# Patient Record
Sex: MALE | Race: WHITE | URBAN - METROPOLITAN AREA
[De-identification: names, ages, dates, MRNs, and addresses within clinical notes are randomized per-mention and may not be internally consistent; named-entity substitution may affect disease eponyms.]

---

## 2022-03-03 ENCOUNTER — DOCTOR'S OFFICE (OUTPATIENT)
Dept: URBAN - METROPOLITAN AREA CLINIC 125 | Facility: CLINIC | Age: 57
Setting detail: OPHTHALMOLOGY
End: 2022-03-03
Payer: COMMERCIAL

## 2022-03-03 ENCOUNTER — RX ONLY (RX ONLY)
Age: 57
End: 2022-03-03

## 2022-03-03 DIAGNOSIS — H52.13: ICD-10-CM

## 2022-03-03 DIAGNOSIS — H52.4: ICD-10-CM

## 2022-03-03 DIAGNOSIS — H52.203: ICD-10-CM

## 2022-03-03 PROCEDURE — 92004 COMPRE OPH EXAM NEW PT 1/>: CPT | Performed by: OPHTHALMOLOGY

## 2022-03-03 PROCEDURE — 92015 DETERMINE REFRACTIVE STATE: CPT | Performed by: OPHTHALMOLOGY

## 2022-03-03 ASSESSMENT — REFRACTION_MANIFEST
OD_AXIS: 175
OD_VA1: 20/30
OS_AXIS: 25
OS_CYLINDER: -0.50
OS_VA1: 20/30
OS_ADD: +2.00
OS_VA2: 20/30+1
OS_SPHERE: -1.25
OD_CYLINDER: -0.50
OU_VA: 20/30
OD_SPHERE: -0.75
OD_VA2: 20/30+1
OD_ADD: +2.00

## 2022-03-03 ASSESSMENT — CONFRONTATIONAL VISUAL FIELD TEST (CVF)
OD_FINDINGS: FULL
OS_FINDINGS: FULL

## 2022-03-03 ASSESSMENT — VISUAL ACUITY
OD_BCVA: 20/40
OS_BCVA: 20/50

## 2022-03-03 ASSESSMENT — SPHEQUIV_DERIVED
OS_SPHEQUIV: -1.5
OS_SPHEQUIV: -0.875
OD_SPHEQUIV: -0.875
OD_SPHEQUIV: -1

## 2022-03-03 ASSESSMENT — KERATOMETRY
OS_AXISANGLE_DEGREES: 004
OD_AXISANGLE_DEGREES: 169
OS_K2POWER_DIOPTERS: 45.25
OD_K1POWER_DIOPTERS: 43.75
OS_K1POWER_DIOPTERS: 44.00
OD_K2POWER_DIOPTERS: 45.00

## 2022-03-03 ASSESSMENT — REFRACTION_AUTOREFRACTION
OD_SPHERE: -0.50
OS_SPHERE: -0.75
OS_AXIS: 055
OD_CYLINDER: -0.75
OS_CYLINDER: -0.25
OD_AXIS: 174

## 2022-03-03 ASSESSMENT — AXIALLENGTH_DERIVED
OD_AL: 23.6114
OD_AL: 23.6603
OS_AL: 23.5198
OS_AL: 23.7643

## 2023-05-24 ENCOUNTER — APPOINTMENT (EMERGENCY)
Dept: RADIOLOGY | Facility: HOSPITAL | Age: 58
End: 2023-05-24

## 2023-05-24 ENCOUNTER — HOSPITAL ENCOUNTER (INPATIENT)
Facility: HOSPITAL | Age: 58
LOS: 6 days | Discharge: HOME | End: 2023-05-30
Attending: EMERGENCY MEDICINE | Admitting: INTERNAL MEDICINE

## 2023-05-24 ENCOUNTER — APPOINTMENT (EMERGENCY)
Dept: CT IMAGING | Facility: HOSPITAL | Age: 58
End: 2023-05-24

## 2023-05-24 DIAGNOSIS — J90 PLEURAL EFFUSION: Primary | ICD-10-CM

## 2023-05-24 DIAGNOSIS — K20.90 ESOPHAGITIS: ICD-10-CM

## 2023-05-24 DIAGNOSIS — M10.9 GOUTY ARTHRITIS OF RIGHT GREAT TOE: ICD-10-CM

## 2023-05-24 DIAGNOSIS — R09.81 NASAL CONGESTION: ICD-10-CM

## 2023-05-24 DIAGNOSIS — J90 RECURRENT PLEURAL EFFUSION ON RIGHT: ICD-10-CM

## 2023-05-24 DIAGNOSIS — K86.9 LESION OF PANCREAS: ICD-10-CM

## 2023-05-24 DIAGNOSIS — K56.609 SMALL BOWEL OBSTRUCTION (HCC): ICD-10-CM

## 2023-05-24 DIAGNOSIS — J69.0 ASPIRATION PNEUMONIA (HCC): ICD-10-CM

## 2023-05-24 DIAGNOSIS — K59.00 CONSTIPATION: ICD-10-CM

## 2023-05-24 PROBLEM — F79 MENTAL IMPAIRMENT: Status: ACTIVE | Noted: 2023-04-21

## 2023-05-24 PROBLEM — J45.30 MILD PERSISTENT ASTHMA WITHOUT COMPLICATION: Status: ACTIVE | Noted: 2023-03-17

## 2023-05-24 PROBLEM — N40.0 BPH (BENIGN PROSTATIC HYPERPLASIA): Status: ACTIVE | Noted: 2023-01-20

## 2023-05-24 PROBLEM — E66.01 MORBID OBESITY (HCC): Status: ACTIVE | Noted: 2023-03-17

## 2023-05-24 PROBLEM — G47.33 OSA (OBSTRUCTIVE SLEEP APNEA): Status: ACTIVE | Noted: 2023-05-05

## 2023-05-24 PROBLEM — K52.9 CHRONIC DIARRHEA: Status: ACTIVE | Noted: 2023-03-17

## 2023-05-24 PROBLEM — Q45.3 PANCREATIC ABNORMALITY: Status: ACTIVE | Noted: 2023-05-24

## 2023-05-24 PROBLEM — F31.9 BIPOLAR DISORDER (HCC): Status: ACTIVE | Noted: 2023-01-20

## 2023-05-24 PROBLEM — K56.600 SMALL BOWEL OBSTRUCTION, PARTIAL (HCC): Status: ACTIVE | Noted: 2023-05-24

## 2023-05-24 PROBLEM — D64.9 NORMOCYTIC ANEMIA: Status: ACTIVE | Noted: 2023-01-20

## 2023-05-24 PROBLEM — I51.7 CARDIOMEGALY: Status: ACTIVE | Noted: 2023-01-20

## 2023-05-24 PROBLEM — I10 ESSENTIAL HYPERTENSION: Status: ACTIVE | Noted: 2023-01-20

## 2023-05-24 LAB
2HR DELTA HS TROPONIN: 0 NG/L
ALBUMIN SERPL BCP-MCNC: 3.8 G/DL (ref 3.5–5)
ALP SERPL-CCNC: 55 U/L (ref 34–104)
ALT SERPL W P-5'-P-CCNC: 9 U/L (ref 7–52)
ANION GAP SERPL CALCULATED.3IONS-SCNC: 8 MMOL/L (ref 4–13)
APTT PPP: 26 SECONDS (ref 23–37)
AST SERPL W P-5'-P-CCNC: 10 U/L (ref 13–39)
ATRIAL RATE: 85 BPM
ATRIAL RATE: 87 BPM
BASOPHILS # BLD AUTO: 0.01 THOUSANDS/ÂΜL (ref 0–0.1)
BASOPHILS NFR BLD AUTO: 0 % (ref 0–1)
BILIRUB SERPL-MCNC: 0.53 MG/DL (ref 0.2–1)
BUN SERPL-MCNC: 22 MG/DL (ref 5–25)
CALCIUM SERPL-MCNC: 9.1 MG/DL (ref 8.4–10.2)
CARDIAC TROPONIN I PNL SERPL HS: 3 NG/L
CARDIAC TROPONIN I PNL SERPL HS: 3 NG/L
CHLORIDE SERPL-SCNC: 100 MMOL/L (ref 96–108)
CO2 SERPL-SCNC: 30 MMOL/L (ref 21–32)
CREAT SERPL-MCNC: 1.23 MG/DL (ref 0.6–1.3)
EOSINOPHIL # BLD AUTO: 0 THOUSAND/ÂΜL (ref 0–0.61)
EOSINOPHIL NFR BLD AUTO: 0 % (ref 0–6)
ERYTHROCYTE [DISTWIDTH] IN BLOOD BY AUTOMATED COUNT: 13.7 % (ref 11.6–15.1)
FLUAV RNA RESP QL NAA+PROBE: NEGATIVE
FLUBV RNA RESP QL NAA+PROBE: NEGATIVE
GFR SERPL CREATININE-BSD FRML MDRD: 65 ML/MIN/1.73SQ M
GLUCOSE SERPL-MCNC: 168 MG/DL (ref 65–140)
HCT VFR BLD AUTO: 43.5 % (ref 36.5–49.3)
HGB BLD-MCNC: 14.2 G/DL (ref 12–17)
IMM GRANULOCYTES # BLD AUTO: 0.03 THOUSAND/UL (ref 0–0.2)
IMM GRANULOCYTES NFR BLD AUTO: 0 % (ref 0–2)
INR PPP: 1.12 (ref 0.84–1.19)
LACTATE SERPL-SCNC: 1.4 MMOL/L (ref 0.5–2)
LIPASE SERPL-CCNC: <6 U/L (ref 11–82)
LYMPHOCYTES # BLD AUTO: 0.4 THOUSANDS/ÂΜL (ref 0.6–4.47)
LYMPHOCYTES NFR BLD AUTO: 4 % (ref 14–44)
MCH RBC QN AUTO: 31.3 PG (ref 26.8–34.3)
MCHC RBC AUTO-ENTMCNC: 32.6 G/DL (ref 31.4–37.4)
MCV RBC AUTO: 96 FL (ref 82–98)
MONOCYTES # BLD AUTO: 1.45 THOUSAND/ÂΜL (ref 0.17–1.22)
MONOCYTES NFR BLD AUTO: 14 % (ref 4–12)
NEUTROPHILS # BLD AUTO: 8.38 THOUSANDS/ÂΜL (ref 1.85–7.62)
NEUTS SEG NFR BLD AUTO: 82 % (ref 43–75)
NRBC BLD AUTO-RTO: 0 /100 WBCS
P AXIS: 16 DEGREES
P AXIS: 30 DEGREES
PLATELET # BLD AUTO: 287 THOUSANDS/UL (ref 149–390)
PMV BLD AUTO: 9.8 FL (ref 8.9–12.7)
POTASSIUM SERPL-SCNC: 4.1 MMOL/L (ref 3.5–5.3)
PR INTERVAL: 126 MS
PR INTERVAL: 136 MS
PROCALCITONIN SERPL-MCNC: 0.06 NG/ML
PROT SERPL-MCNC: 7.3 G/DL (ref 6.4–8.4)
PROTHROMBIN TIME: 14.6 SECONDS (ref 11.6–14.5)
QRS AXIS: 70 DEGREES
QRS AXIS: 83 DEGREES
QRSD INTERVAL: 92 MS
QRSD INTERVAL: 92 MS
QT INTERVAL: 376 MS
QT INTERVAL: 382 MS
QTC INTERVAL: 452 MS
QTC INTERVAL: 454 MS
RBC # BLD AUTO: 4.54 MILLION/UL (ref 3.88–5.62)
RSV RNA RESP QL NAA+PROBE: NEGATIVE
S PYO DNA THROAT QL NAA+PROBE: NOT DETECTED
SARS-COV-2 RNA RESP QL NAA+PROBE: NEGATIVE
SODIUM SERPL-SCNC: 138 MMOL/L (ref 135–147)
T WAVE AXIS: 33 DEGREES
T WAVE AXIS: 46 DEGREES
VENTRICULAR RATE: 85 BPM
VENTRICULAR RATE: 87 BPM
WBC # BLD AUTO: 10.27 THOUSAND/UL (ref 4.31–10.16)

## 2023-05-24 RX ORDER — ONDANSETRON 2 MG/ML
4 INJECTION INTRAMUSCULAR; INTRAVENOUS ONCE
Status: COMPLETED | OUTPATIENT
Start: 2023-05-24 | End: 2023-05-24

## 2023-05-24 RX ORDER — MONTELUKAST SODIUM 10 MG/1
10 TABLET ORAL
COMMUNITY
Start: 2023-03-17

## 2023-05-24 RX ORDER — DIPHENHYDRAMINE HCL 50 MG
50 CAPSULE ORAL EVERY 6 HOURS PRN
COMMUNITY
Start: 2023-03-17

## 2023-05-24 RX ORDER — METOCLOPRAMIDE HYDROCHLORIDE 5 MG/ML
10 INJECTION INTRAMUSCULAR; INTRAVENOUS ONCE
Status: COMPLETED | OUTPATIENT
Start: 2023-05-24 | End: 2023-05-24

## 2023-05-24 RX ORDER — ALLOPURINOL 100 MG/1
100 TABLET ORAL DAILY
COMMUNITY
Start: 2023-03-24 | End: 2024-03-23

## 2023-05-24 RX ORDER — DIPHENOXYLATE HYDROCHLORIDE AND ATROPINE SULFATE 2.5; .025 MG/1; MG/1
2 TABLET ORAL 2 TIMES DAILY PRN
COMMUNITY
Start: 2023-03-17

## 2023-05-24 RX ORDER — ENOXAPARIN SODIUM 100 MG/ML
40 INJECTION SUBCUTANEOUS DAILY
Status: DISCONTINUED | OUTPATIENT
Start: 2023-05-25 | End: 2023-05-24 | Stop reason: DRUGHIGH

## 2023-05-24 RX ORDER — LOSARTAN POTASSIUM 100 MG/1
100 TABLET ORAL DAILY
COMMUNITY
Start: 2023-01-24 | End: 2024-01-24

## 2023-05-24 RX ORDER — PANTOPRAZOLE SODIUM 40 MG/10ML
40 INJECTION, POWDER, LYOPHILIZED, FOR SOLUTION INTRAVENOUS
Status: DISCONTINUED | OUTPATIENT
Start: 2023-05-25 | End: 2023-05-28

## 2023-05-24 RX ORDER — IBUPROFEN 600 MG/1
600 TABLET ORAL EVERY 6 HOURS PRN
Status: ON HOLD | COMMUNITY
Start: 2023-03-17 | End: 2023-05-29 | Stop reason: SDUPTHER

## 2023-05-24 RX ORDER — ACETAMINOPHEN 325 MG/1
650 TABLET ORAL ONCE
Status: COMPLETED | OUTPATIENT
Start: 2023-05-24 | End: 2023-05-24

## 2023-05-24 RX ORDER — RISPERIDONE 0.5 MG/1
0.5 TABLET ORAL 2 TIMES DAILY
COMMUNITY

## 2023-05-24 RX ORDER — RISPERIDONE 1 MG/1
1 TABLET ORAL 2 TIMES DAILY
COMMUNITY

## 2023-05-24 RX ORDER — LOSARTAN POTASSIUM 100 MG/1
25 TABLET ORAL DAILY
COMMUNITY

## 2023-05-24 RX ORDER — COLCHICINE 0.6 MG/1
0.6 CAPSULE ORAL DAILY
COMMUNITY
Start: 2023-04-24

## 2023-05-24 RX ORDER — DIVALPROEX SODIUM 500 MG/1
500 TABLET, DELAYED RELEASE ORAL 2 TIMES DAILY
COMMUNITY

## 2023-05-24 RX ORDER — EPINEPHRINE 0.3 MG/.3ML
0.3 INJECTION SUBCUTANEOUS
COMMUNITY
Start: 2023-03-17

## 2023-05-24 RX ORDER — POLYETHYLENE GLYCOL 3350 17 G/17G
17 POWDER, FOR SOLUTION ORAL
COMMUNITY
Start: 2023-04-21 | End: 2024-04-20

## 2023-05-24 RX ORDER — ESCITALOPRAM OXALATE 20 MG/1
20 TABLET ORAL DAILY
COMMUNITY

## 2023-05-24 RX ORDER — BENZONATATE 200 MG/1
200 CAPSULE ORAL 3 TIMES DAILY PRN
COMMUNITY
Start: 2023-03-17 | End: 2023-05-30

## 2023-05-24 RX ORDER — FUROSEMIDE 20 MG/1
1 TABLET ORAL DAILY PRN
COMMUNITY
Start: 2023-03-17 | End: 2023-09-13

## 2023-05-24 RX ORDER — IPRATROPIUM BROMIDE AND ALBUTEROL SULFATE 2.5; .5 MG/3ML; MG/3ML
3 SOLUTION RESPIRATORY (INHALATION)
Status: DISCONTINUED | OUTPATIENT
Start: 2023-05-24 | End: 2023-05-25

## 2023-05-24 RX ORDER — TAMSULOSIN HYDROCHLORIDE 0.4 MG/1
0.8 CAPSULE ORAL
COMMUNITY
Start: 2023-03-17 | End: 2024-03-16

## 2023-05-24 RX ORDER — ENOXAPARIN SODIUM 100 MG/ML
40 INJECTION SUBCUTANEOUS EVERY 12 HOURS SCHEDULED
Status: DISCONTINUED | OUTPATIENT
Start: 2023-05-24 | End: 2023-05-25

## 2023-05-24 RX ADMIN — METOCLOPRAMIDE 10 MG: 5 INJECTION, SOLUTION INTRAMUSCULAR; INTRAVENOUS at 13:27

## 2023-05-24 RX ADMIN — ONDANSETRON 4 MG: 2 INJECTION INTRAMUSCULAR; INTRAVENOUS at 14:34

## 2023-05-24 RX ADMIN — ENOXAPARIN SODIUM 40 MG: 40 INJECTION SUBCUTANEOUS at 21:21

## 2023-05-24 RX ADMIN — AZITHROMYCIN MONOHYDRATE 500 MG: 500 INJECTION, POWDER, LYOPHILIZED, FOR SOLUTION INTRAVENOUS at 15:45

## 2023-05-24 RX ADMIN — RISPERIDONE 1.5 MG: 0.5 TABLET, ORALLY DISINTEGRATING ORAL at 21:21

## 2023-05-24 RX ADMIN — VALPROATE SODIUM 250 MG: 100 INJECTION, SOLUTION INTRAVENOUS at 20:02

## 2023-05-24 RX ADMIN — ACETAMINOPHEN 650 MG: 325 TABLET ORAL at 13:19

## 2023-05-24 RX ADMIN — CEFTRIAXONE SODIUM 1000 MG: 10 INJECTION, POWDER, FOR SOLUTION INTRAVENOUS at 15:24

## 2023-05-24 RX ADMIN — IPRATROPIUM BROMIDE AND ALBUTEROL SULFATE 3 ML: 2.5; .5 SOLUTION RESPIRATORY (INHALATION) at 12:49

## 2023-05-24 RX ADMIN — ONDANSETRON 4 MG: 2 INJECTION INTRAMUSCULAR; INTRAVENOUS at 12:45

## 2023-05-24 RX ADMIN — IOHEXOL 100 ML: 350 INJECTION, SOLUTION INTRAVENOUS at 14:47

## 2023-05-24 RX ADMIN — SODIUM CHLORIDE 1000 ML: 0.9 INJECTION, SOLUTION INTRAVENOUS at 12:48

## 2023-05-24 NOTE — ASSESSMENT & PLAN NOTE
Evidence of esophagitis on CT  No noted history    Plan:  add PPI  Patient does have appointment with outpatient GI for issue with chronic diarrhea, can follow-up bout this esophagitis at that time

## 2023-05-24 NOTE — CONSULTS
Consultation - General Surgery   Chaitanya Ragsdale 66 y o  male MRN: [id number]  Unit/Bed#: ED-16 Encounter: [id number]    Assessment/Plan     Assessment:  51-year-old male with a history and exam consistent with likely gastroenteritis  Given CT findings, would recommend gastric decompression with a repeat CT scan on 5/25 with oral contrast     Plan:  -Recommend admission to medicine  -Continue n p o   -NG tube to low continuous wall suction  -Tentative plan for repeat CT abdomen pelvis with p o  contrast on 5/25  -Monitor abdominal exam  -Please TT the RA surgery resident role with questions or concerns    History of Present Illness     HPI:  Chaitanya Ragsdale is a 66 y o  male who presents with abdominal pain, nausea, and vomiting in addition to coughing and chest congestion with fever  He is here with one of his caregivers who notes that he has been experiencing abdominal pain and nausea for the last 2 weeks and that over the past 2 days he has had increasing abdominal pain and diarrhea  When he got to the emergency department he had bilious vomiting  He has had 3 bowel movements today, the last of which was just prior to arrival in the emergency department  He is passing gas  He has a history of bipolar disease for which she is on multiple medications  No known prior surgeries  No reported history of bowel obstructions  Per his caregiver, he has a history of chronic pleural effusions which are drained intermittently  Consult to surgery general  Consult performed by: Bernice Ovalle MD  Consult ordered by: Darryl Hilario PA-C          Review of Systems   Constitutional: Positive for fever  Negative for chills  Respiratory: Positive for cough and shortness of breath  Cardiovascular: Negative for chest pain and leg swelling  Gastrointestinal: Positive for abdominal distention, abdominal pain, diarrhea, nausea and vomiting  Negative for constipation     All other systems reviewed and are negative  Historical Information   History reviewed  No pertinent past medical history  History reviewed  No pertinent surgical history  Social History   Social History     Substance and Sexual Activity   Alcohol Use None     Social History     Substance and Sexual Activity   Drug Use Not on file     E-Cigarette/Vaping     E-Cigarette/Vaping Substances     Social History     Tobacco Use   Smoking Status Not on file   Smokeless Tobacco Not on file     Family History: non-contributory    Meds/Allergies   all current active meds have been reviewed and PTA meds:   None     Allergies   Allergen Reactions   • Bee Venom Anaphylaxis, Hives and Swelling       Objective   First Vitals:   Blood Pressure: 157/85 (05/24/23 1155)  Pulse: 83 (05/24/23 1155)  Temperature: 100 2 °F (37 9 °C) (05/24/23 1155)  Temp Source: Oral (05/24/23 1155)  Respirations: (S) (!) 24 (05/24/23 1157)  Weight - Scale: 118 kg (259 lb 4 2 oz) (05/24/23 1155)  SpO2: 96 % (05/24/23 1155)    Current Vitals:   Blood Pressure: 109/64 (05/24/23 1715)  Pulse: 83 (05/24/23 1715)  Temperature: 99 5 °F (37 5 °C) (05/24/23 1418)  Temp Source: Oral (05/24/23 1418)  Respirations: 20 (05/24/23 1700)  Weight - Scale: 118 kg (259 lb 4 2 oz) (05/24/23 1155)  SpO2: 95 % (05/24/23 1715)      Intake/Output Summary (Last 24 hours) at 5/24/2023 1749  Last data filed at 5/24/2023 1700  Gross per 24 hour   Intake 1300 ml   Output --   Net 1300 ml       Invasive Devices     Peripheral Intravenous Line  Duration           Peripheral IV 05/24/23 Distal;Right;Upper;Ventral (anterior) Arm <1 day                Physical Exam  Vitals reviewed  Constitutional:       Appearance: He is obese  He is ill-appearing  Cardiovascular:      Rate and Rhythm: Normal rate and regular rhythm  Pulmonary:      Comments: Coarse breath sounds, intermittent cough  Abdominal:      General: There is distension  Palpations: Abdomen is soft  Tenderness:  There is no abdominal tenderness  There is no guarding or rebound  Skin:     General: Skin is warm and dry  Coloration: Skin is not jaundiced or pale  Neurological:      Mental Status: He is alert and oriented to person, place, and time  Lab Results:   I have personally reviewed pertinent lab results  , CBC:   Lab Results   Component Value Date    HCT 43 5 05/24/2023    HGB 14 2 05/24/2023    MCH 31 3 05/24/2023    MCHC 32 6 05/24/2023    MCV 96 05/24/2023    MPV 9 8 05/24/2023    NRBC 0 05/24/2023     05/24/2023    RBC 4 54 05/24/2023    RDW 13 7 05/24/2023    WBC 10 27 (H) 05/24/2023   , CMP:   Lab Results   Component Value Date    ALKPHOS 55 05/24/2023    ALT 9 05/24/2023    AST 10 (L) 05/24/2023    BUN 22 05/24/2023    CALCIUM 9 1 05/24/2023     05/24/2023    CO2 30 05/24/2023    CREATININE 1 23 05/24/2023    EGFR 65 05/24/2023    K 4 1 05/24/2023    SODIUM 138 05/24/2023     Imaging: I have personally reviewed pertinent reports  and I have personally reviewed pertinent films in PACS  EKG, Pathology, and Other Studies: I have personally reviewed pertinent reports

## 2023-05-24 NOTE — ASSESSMENT & PLAN NOTE
· Patient is n p o , will switch risperidone twice daily to oral disintegrating tablets  · We will switch Depakote to IV (250 mg every 6 hours, per pharmacy)

## 2023-05-24 NOTE — ASSESSMENT & PLAN NOTE
· Caregiver reports patient eats very rapidly and often coughs/chokes during eating  · Rhonchorous breath sounds in the emergency department  · Received dose of antibiotics in the emergency department    Plan:  · Supplemental O2 as needed for O2 saturation greater than 94%  · Further antibiotics for now  · DuoNebs every 6 hours  · Monitor WBC and fever curve  · Procalcitonin in a m , although confounded from pleural effusion

## 2023-05-24 NOTE — ASSESSMENT & PLAN NOTE
· Newly diagnosed via sleep study  · Not currently using BiPAP  · BiPAP cannot be used while patient has NG tube in

## 2023-05-24 NOTE — ED PROVIDER NOTES
History  Chief Complaint   Patient presents with   • Nasal Congestion     Pt presentse to the ED with chest congestion, sob, diarrhea, abd pain x 2-3 days  66year old male presents today with concerns of chest congestion, abdominal pain, diarrhea, nausea, thick sputum, fever  Patient also complains of some chills  Patient has a history of bipolar for which she is on multiple medications, allergies as well  History of reflexes to be benign  No recent exposures  States that the chest congestion and cough have been ongoing for the last 2 days slowly worsening  States that the abdominal pain has been ongoing for about 2 weeks, diarrhea for the last couple days  Denies any vomiting however has been nauseous over the last couple days  Denies any leg swelling or chest pain although does admit to some chest tightness  No shortness of breath at this time  No history of asthma or COPD  Lives with a roommate, checked in with by adult services multiple times a week  Patient's caregiver states that he typically does not look this bad  Per [facility] history contains : Allergic   Benign prostatic hyperplasia   Bipolar disorder, unspecified ([facility] [address] )   Edema 2023   Enlarged prostate   Hypertension   Mild cognitive impairment   Pleural effusion 2023   Pulmonary nodule   Seizures (HCC)             Prior to Admission Medications   Prescriptions Last Dose Informant Patient Reported? Taking?    Colchicine 0 6 MG CAPS 5/24/2023  Yes Yes   Sig: Take 0 6 mg by mouth daily   EPINEPHrine (EPIPEN) 0 3 mg/0 3 mL SOAJ Unknown  Yes No   Sig: Inject 0 3 mg into a muscle   allopurinol (ZYLOPRIM) 100 mg tablet 5/24/2023  Yes Yes   Sig: Take 100 mg by mouth daily   benzonatate (TESSALON) 200 MG capsule Not Taking  Yes No   Sig: Take 200 mg by mouth Three times daily as needed   Patient not taking: Reported on 5/24/2023   diphenhydrAMINE (BENADRYL) 50 mg capsule Unknown  Yes No   Sig: Take 50 mg by mouth every 6 (six) hours as needed diphenoxylate-atropine (LOMOTIL) 2 5-0 025 mg per tablet Unknown  Yes No   Sig: Take 2 tablets by mouth 2 (two) times a day as needed   divalproex sodium (DEPAKOTE) 500 mg DR tablet 5/24/2023  Yes Yes   Sig: Take 500 mg by mouth 2 (two) times a day   escitalopram (LEXAPRO) 20 mg tablet 5/24/2023  Yes Yes   Sig: Take 20 mg by mouth daily   furosemide (LASIX) 20 mg tablet Unknown  Yes No   Sig: Take 1 tablet by mouth daily as needed   ibuprofen (MOTRIN) 600 mg tablet Unknown  Yes No   Sig: Take 600 mg by mouth every 6 (six) hours as needed   losartan (COZAAR) 100 MG tablet 5/24/2023  Yes Yes   Sig: Take 25 mg by mouth daily   losartan (COZAAR) 100 MG tablet Unknown  Yes No   Sig: Take 100 mg by mouth daily   menthol-zinc oxide (CALMOSEPTINE) 0 44-20 6 % OINT Unknown  Yes No   Sig: Apply topically   montelukast (SINGULAIR) 10 mg tablet Unknown  Yes No   Sig: Take 10 mg by mouth   polyethylene glycol (GLYCOLAX) 17 GM/SCOOP powder Unknown  Yes No   Sig: Take 17 g by mouth   risperiDONE (RisperDAL) 0 5 mg tablet 5/24/2023  Yes Yes   Sig: Take 0 5 mg by mouth 2 (two) times a day   risperiDONE (RisperDAL) 1 mg tablet 5/24/2023  Yes Yes   Sig: Take 1 mg by mouth 2 (two) times a day   tamsulosin (FLOMAX) 0 4 mg Unknown  Yes No   Sig: Take 0 8 mg by mouth      Facility-Administered Medications: None       History reviewed  No pertinent past medical history  History reviewed  No pertinent surgical history  History reviewed  No pertinent family history  I have reviewed and agree with the history as documented  E-Cigarette/Vaping     E-Cigarette/Vaping Substances          Review of Systems   Constitutional: Positive for chills and fever  HENT: Positive for congestion and postnasal drip  Negative for ear pain, nosebleeds, sore throat and trouble swallowing  Eyes: Negative for pain and visual disturbance  Respiratory: Positive for cough and chest tightness   Negative for apnea, choking, shortness of breath, wheezing and stridor  Cardiovascular: Negative for chest pain, palpitations and leg swelling  Gastrointestinal: Positive for abdominal pain, diarrhea and nausea  Negative for abdominal distention, anal bleeding, blood in stool, constipation, rectal pain and vomiting  Genitourinary: Negative for dysuria, flank pain, frequency and hematuria  Musculoskeletal: Negative for arthralgias and back pain  Skin: Negative for color change and rash  Neurological: Negative for dizziness, seizures, syncope, weakness, light-headedness and headaches  All other systems reviewed and are negative  Physical Exam  Physical Exam  Vitals and nursing note reviewed  Constitutional:       General: He is not in acute distress  Appearance: He is well-developed  He is obese  He is ill-appearing  HENT:      Head: Normocephalic and atraumatic  Eyes:      Conjunctiva/sclera: Conjunctivae normal    Cardiovascular:      Rate and Rhythm: Normal rate and regular rhythm  Pulses: Normal pulses  Heart sounds: Normal heart sounds  No murmur heard  No friction rub  No gallop  Pulmonary:      Effort: Pulmonary effort is normal  No respiratory distress  Breath sounds: No stridor  Rhonchi present  No wheezing or rales  Chest:      Chest wall: No tenderness  Abdominal:      General: There is distension (Due to obesity)  Palpations: Abdomen is soft  Tenderness: There is abdominal tenderness  There is no right CVA tenderness, left CVA tenderness, guarding or rebound  Hernia: No hernia is present  Musculoskeletal:         General: No swelling, tenderness, deformity or signs of injury  Cervical back: Neck supple  Right lower leg: Edema present  Left lower leg: Edema (Trace bilaterally) present  Skin:     General: Skin is warm and dry  Capillary Refill: Capillary refill takes less than 2 seconds  Coloration: Skin is not jaundiced or pale        Findings: No bruising, erythema, lesion or rash  Neurological:      Mental Status: He is alert     Psychiatric:         Mood and Affect: Mood normal          Vital Signs  ED Triage Vitals   Temperature Pulse Respirations Blood Pressure SpO2   05/24/23 1155 05/24/23 1155 05/24/23 1157 05/24/23 1155 05/24/23 1155   100 2 °F (37 9 °C) 83 (S) (!) 24 157/85 96 %      Temp Source Heart Rate Source Patient Position - Orthostatic VS BP Location FiO2 (%)   05/24/23 1155 05/24/23 1155 05/24/23 1155 05/24/23 1155 --   Oral Monitor Sitting Left arm       Pain Score       05/24/23 1405       6           Vitals:    05/24/23 1715 05/24/23 1730 05/24/23 1800 05/24/23 1852   BP: 109/64 104/55 108/56 109/84   Pulse: 83 80 80 90   Patient Position - Orthostatic VS: Lying   Lying         Visual Acuity      ED Medications  Medications   ipratropium-albuterol (DUO-NEB) 0 5-2 5 mg/3 mL inhalation solution 3 mL (3 mL Nebulization Given 5/24/23 1249)   valproate (DEPACON) 250 mg in sodium chloride 0 9 % 50 mL IVPB (250 mg Intravenous New Bag 5/24/23 2002)   risperiDONE (RisperDAL M-TAB) disintegrating tablet 1 5 mg (has no administration in time range)   pantoprazole (PROTONIX) injection 40 mg (has no administration in time range)   enoxaparin (LOVENOX) subcutaneous injection 40 mg (has no administration in time range)   sodium chloride 0 9 % bolus 1,000 mL (0 mL Intravenous Stopped 5/24/23 1459)   acetaminophen (TYLENOL) tablet 650 mg (650 mg Oral Given 5/24/23 1319)   ondansetron (ZOFRAN) injection 4 mg (4 mg Intravenous Given 5/24/23 1245)   metoclopramide (REGLAN) injection 10 mg (10 mg Intravenous Given 5/24/23 1327)   ondansetron (ZOFRAN) injection 4 mg (4 mg Intravenous Given 5/24/23 1434)   iohexol (OMNIPAQUE) 350 MG/ML injection (SINGLE-DOSE) 100 mL (100 mL Intravenous Given 5/24/23 1447)   ceftriaxone (ROCEPHIN) 1 g/50 mL in dextrose IVPB (0 mg Intravenous Stopped 5/24/23 1540)   azithromycin (ZITHROMAX) 500 mg in sodium chloride 0 9% 250mL IVPB 500 mg (0 mg Intravenous Stopped 5/24/23 1700)       Diagnostic Studies  Results Reviewed     Procedure Component Value Units Date/Time    Blood culture #1 [[id number]] Collected: 05/24/23 1238    Lab Status: Preliminary result Specimen: Blood from Arm, Left Updated: 05/24/23 1701     Blood Culture Received in Microbiology Lab  Culture in Progress  Blood culture #2 [[id number]] Collected: 05/24/23 1238    Lab Status: Preliminary result Specimen: Blood from Arm, Right Updated: 05/24/23 1701     Blood Culture Received in Microbiology Lab  Culture in Progress  Lipase [[id number]]  (Abnormal) Collected: 05/24/23 1238    Lab Status: Final result Specimen: Blood from Arm, Right Updated: 05/24/23 1656     Lipase <6 u/L     HS Troponin I 2hr [[id number]]  (Normal) Collected: 05/24/23 1523    Lab Status: Final result Specimen: Blood from Arm, Right Updated: 05/24/23 1556     hs TnI 2hr 3 ng/L      Delta 2hr hsTnI 0 ng/L     FLU/RSV/COVID - if FLU/RSV clinically relevant [[id number]]  (Normal) Collected: 05/24/23 1238    Lab Status: Final result Specimen: Nares from Nose Updated: 05/24/23 1330     SARS-CoV-2 Negative     INFLUENZA A PCR Negative     INFLUENZA B PCR Negative     RSV PCR Negative    Narrative:      FOR PEDIATRIC PATIENTS - copy/paste COVID Guidelines URL to browser: [email or web address]    SARS-CoV-2 assay is a Nucleic Acid Amplification assay intended for the  qualitative detection of nucleic acid from SARS-CoV-2 in nasopharyngeal  swabs  Results are for the presumptive identification of SARS-CoV-2 RNA  Positive results are indicative of infection with SARS-CoV-2, the virus  causing COVID-19, but do not rule out bacterial infection or co-infection  with other viruses  Laboratories within the [place] and its  territories are required to report all positive results to the appropriate  public health authorities   Negative results do not preclude SARS-CoV-2  infection and should not be used as the sole basis for treatment or other  patient management decisions  Negative results must be combined with  clinical observations, patient history, and epidemiological information  This test has not been FDA cleared or approved  This test has been authorized by FDA under an Emergency Use Authorization  (EUA)  This test is only authorized for the duration of time the  declaration that circumstances exist justifying the authorization of the  emergency use of an in vitro diagnostic tests for detection of SARS-CoV-2  virus and/or diagnosis of COVID-19 infection under section 564(b)(1) of  the Act, 21 U  S C  316WUM-1(P)(0), unless the authorization is terminated  or revoked sooner  The test has been validated but independent review by FDA  and CLIA is pending  Test performed using [employer] Gene[employer]pert: This RT-PCR assay targets N2,  a region unique to SARS-CoV-2  A conserved region in the E-gene was chosen  for pan-Sarbecovirus detection which includes SARS-CoV-2  According to CMS-2020-01-R, this platform meets the definition of high-throughput technology      Procalcitonin [[id number]]  (Normal) Collected: 05/24/23 1238    Lab Status: Final result Specimen: Blood from Arm, Right Updated: 05/24/23 1318     Procalcitonin 0 06 ng/ml     Strep A PCR [[id number]]  (Normal) Collected: 05/24/23 1238    Lab Status: Final result Specimen: Throat Updated: 05/24/23 1316     STREP A PCR Not Detected    HS Troponin 0hr (reflex protocol) [[id number]]  (Normal) Collected: 05/24/23 1238    Lab Status: Final result Specimen: Blood from Arm, Right Updated: 05/24/23 1315     hs TnI 0hr 3 ng/L     Protime-INR [[id number]]  (Abnormal) Collected: 05/24/23 1238    Lab Status: Final result Specimen: Blood from Arm, Right Updated: 05/24/23 1309     Protime 14 6 seconds      INR 1 12    APTT [[id number]]  (Normal) Collected: 05/24/23 1238    Lab Status: Final result Specimen: Blood from Arm, Right Updated: 05/24/23 1309     PTT 26 seconds     Comprehensive metabolic panel [[id number]]  (Abnormal) Collected: 05/24/23 1238    Lab Status: Final result Specimen: Blood from Arm, Right Updated: 05/24/23 1307     Sodium 138 mmol/L      Potassium 4 1 mmol/L      Chloride 100 mmol/L      CO2 30 mmol/L      ANION GAP 8 mmol/L      BUN 22 mg/dL      Creatinine 1 23 mg/dL      Glucose 168 mg/dL      Calcium 9 1 mg/dL      AST 10 U/L      ALT 9 U/L      Alkaline Phosphatase 55 U/L      Total Protein 7 3 g/dL      Albumin 3 8 g/dL      Total Bilirubin 0 53 mg/dL      eGFR 65 ml/min/1 73sq m     Narrative:      [place] guidelines for Chronic Kidney Disease (CKD):   •  Stage 1 with normal or high GFR (GFR > 90 mL/min/1 73 square meters)  •  Stage 2 Mild CKD (GFR = 60-89 mL/min/1 73 square meters)  •  Stage 3A Moderate CKD (GFR = 45-59 mL/min/1 73 square meters)  •  Stage 3B Moderate CKD (GFR = 30-44 mL/min/1 73 square meters)  •  Stage 4 Severe CKD (GFR = 15-29 mL/min/1 73 square meters)  •  Stage 5 End Stage CKD (GFR <15 mL/min/1 73 square meters)  Note: GFR calculation is accurate only with a steady state creatinine    Lactic acid [[id number]]  (Normal) Collected: 05/24/23 1238    Lab Status: Final result Specimen: Blood from Arm, Right Updated: 05/24/23 1306     LACTIC ACID 1 4 mmol/L     Narrative:      Result may be elevated if tourniquet was used during collection      CBC and differential [[id number]]  (Abnormal) Collected: 05/24/23 1238    Lab Status: Final result Specimen: Blood from Arm, Right Updated: 05/24/23 1252     WBC 10 27 Thousand/uL      RBC 4 54 Million/uL      Hemoglobin 14 2 g/dL      Hematocrit 43 5 %      MCV 96 fL      MCH 31 3 pg      MCHC 32 6 g/dL      RDW 13 7 %      MPV 9 8 fL      Platelets 012 Thousands/uL      nRBC 0 /100 WBCs      Neutrophils Relative 82 %      Immat GRANS % 0 %      Lymphocytes Relative 4 %      Monocytes Relative 14 %      Eosinophils Relative 0 %      Basophils Relative 0 %      Neutrophils Absolute 8 38 Thousands/µL      Immature Grans Absolute 0 03 Thousand/uL      Lymphocytes Absolute 0 40 Thousands/µL      Monocytes Absolute 1 45 Thousand/µL      Eosinophils Absolute 0 00 Thousand/µL      Basophils Absolute 0 01 Thousands/µL     UA w Reflex to Microscopic w Reflex to Culture [[id number]]     Lab Status: No result Specimen: Urine                  XR chest 1 view portable   ED Interpretation by Cielo Toth PA-C (05/24 2789)   NG tube in optimal position  Unchanged cardiopulmonary process from previous  CT abdomen pelvis with contrast   Final Result by Yolie Lynch MD (05/24 0967)      1  Small bowel obstruction with transition point in the right lower quadrant  Etiology unclear in the absence of prior surgical history that would result in adhesions  Evaluation of the distal/terminal ileum is limited due to underdistention but    difficult to exclude some infectious or inflammatory wall thickening of the terminal ileum, which would raise the possibility of inflammatory bowel disease such as Crohn's disease with stricture  Obstruction due to occult small bowel mass is also in the    differential although no discrete mass is seen  2   Rounded noncalcified hyperattenuating structure in the pancreatic tail region measuring 1 5 x 1 6 cm which may be due to a tumor nodule (i e  neuroendocrine tumor), intrapancreatic splenule, or a noncalcified aneurysm although incompletely    characterized on this single phase study  Recommend nonemergent contrast-enhanced MR abdomen  3   Large right pleural effusion with near complete right lower lobe collapse  4   Diffuse esophageal wall thickening (i e  esophagitis)  Fluid within the distal esophagus can be due to reflux  Correlation with endoscopy can be considered           I personally discussed this study with Adan Sanchez on 5/24/2023 4:25 PM                   Workstation performed: [id number]         XR chest 1 view portable   ED Interpretation by Ketty Prieto PA-C (05/24 1249)   Right pleural effusion, with possible consolidation in right middle lobe  Final Result by Mya Wilkinson MD (05/24 1480)      Right basilar opacity with associated effusion, possibly pneumonia  This report is in agreement with the preliminary interpretation  Workstation performed: [id number]         IR IN-Patient Thoracentesis    (Results Pending)              Procedures  ECG 12 Lead Documentation Only    Date/Time: 5/24/2023 12:25 PM    Performed by: Ketty Prieto PA-C  Authorized by: Ketty Prieto PA-C    Indications / Diagnosis:  Sepsis and chest tightness  ECG reviewed by me, the ED Provider: yes    Patient location:  ED  Previous ECG:     Previous ECG:  Unavailable    Comparison to cardiac monitor: Yes    Interpretation:     Interpretation: non-specific    Quality:     Tracing quality:  Limited by artifact  Rate:     ECG rate:  87    ECG rate assessment: normal    Rhythm:     Rhythm: sinus rhythm    Ectopy:     Ectopy: none    QRS:     QRS axis:  Normal    QRS intervals:  Normal  Conduction:     Conduction: normal    ST segments:     ST segments:  Normal  T waves:     T waves: normal               ED Course  ED Course as of 05/24/23 2053   Wed May 24, 2023   1223 RespirationsKaryl Roche )(S): 24   1223 SpO2: 96 %   1223 Temperature: 100 2 °F (37 9 °C)   1255 WBC(!): 10 27   1311 POCT INR: 1 12   1311 PTT: 26   1311 LACTIC ACID: 1 4   1316 hs TnI 0hr: 3   1316 STREP A PCR: Not Detected   1320 Procalcitonin: 0 06   1700 Lipase(!): <6                                             Medical Decision Making  66year old male presenting today with cough, congestion, chest tightness  History of: Bipolar disorder, unspecified ([facility] [address] ), Edema 2023, Hypertension, Mild cognitive impairment, Pleural effusion 2023, Pulmonary nodule, Seizures (HCC)   Temperature 100 2, BP stable   RR 24, satting well on room air  Obvious rhonchi heard upon entering room  No wheezing, lung sounds heard in all quadrants  DuoNeb did not have much effect sepsis labs ordered  Respirations 24, patient was placed on oxygen 2 maintain saturation above 93%  CT abdomen pelvis with contrast   Chest x-ray did show right effusion with likely pneumonia  High suspicion for aspiration pneumonia  CT abdomen pelvis did show a small bowel obstruction, surgery was consulted and evaluated the patient and due to patient's passing gas and having bowel movements doubted that he needed surgery at this time  NG tube was inserted to protect patient's airway and to limit vomiting  Patient is an aspiration risk  Patient was discussed with internal medicine team for further evaluation and treatment  Was informed that they would take him for paracentesis, IV medications for sepsis protocol  Surgery will be consulted if needed for the bowel obstruction  Mild leukocytosis  Lipase less than 6  Suspect some sort of pancreatic tumor in the setting of diminished lipase enzyme and findings on CT scan  Patient at time of admission to internal medicine team was stable  Amount and/or Complexity of Data Reviewed  Labs: ordered  Decision-making details documented in ED Course  Radiology: ordered and independent interpretation performed  Risk  OTC drugs  Prescription drug management  Decision regarding hospitalization            Disposition  Final diagnoses:   Pleural effusion   Nasal congestion   Aspiration pneumonia ([address] ) - Suspected   Small bowel obstruction ([address] )   Lesion of pancreas   Esophagitis     Time reflects when diagnosis was documented in both MDM as applicable and the Disposition within this note     Time User Action Codes Description Comment    5/24/2023  2:54 PM Rosey Mink Add [J90] Pleural effusion     5/24/2023  2:54 PM Rosey Mink Add [R09 81] Nasal congestion     5/24/2023  2:55 PM Rosey Mink Add [J69 0] Aspiration pneumonia ([facility] )     5/24/2023  2:55 PM Shana Sweeney Modify [J69 0] Aspiration pneumonia [facility]) Suspected    5/24/2023  5:27 PM Shana Patel Add [I71 736] [facility] bowel obstruction ([facility] )     5/24/2023  5:27 PM Shana Patel Add [K86 9] Lesion of pancreas     5/24/2023  5:27 PM Shana Patel Add [K20 90] Esophagitis       ED Disposition     ED Disposition   Admit    Condition   Stable    Date/Time   Wed May 24, 2023  5:29 PM    Comment   Case was discussed with Dr Theresa Castaneda and the patient's admission status was agreed to be Admission Status: inpatient status to the service of Dr Theresa Castaneda              Follow-up Information    None         Current Discharge Medication List      CONTINUE these medications which have NOT CHANGED    Details   allopurinol (ZYLOPRIM) 100 mg tablet Take 100 mg by mouth daily      Colchicine 0 6 MG CAPS Take 0 6 mg by mouth daily      divalproex sodium (DEPAKOTE) 500 mg DR tablet Take 500 mg by mouth 2 (two) times a day      escitalopram (LEXAPRO) 20 mg tablet Take 20 mg by mouth daily      !! losartan (COZAAR) 100 MG tablet Take 25 mg by mouth daily      !! risperiDONE (RisperDAL) 0 5 mg tablet Take 0 5 mg by mouth 2 (two) times a day      !! risperiDONE (RisperDAL) 1 mg tablet Take 1 mg by mouth 2 (two) times a day      benzonatate (TESSALON) 200 MG capsule Take 200 mg by mouth Three times daily as needed      diphenhydrAMINE (BENADRYL) 50 mg capsule Take 50 mg by mouth every 6 (six) hours as needed      diphenoxylate-atropine (LOMOTIL) 2 5-0 025 mg per tablet Take 2 tablets by mouth 2 (two) times a day as needed      EPINEPHrine (EPIPEN) 0 3 mg/0 3 mL SOAJ Inject 0 3 mg into a muscle      furosemide (LASIX) 20 mg tablet Take 1 tablet by mouth daily as needed      ibuprofen (MOTRIN) 600 mg tablet Take 600 mg by mouth every 6 (six) hours as needed      !! losartan (COZAAR) 100 MG tablet Take 100 mg by mouth daily      menthol-zinc oxide (CALMOSEPTINE) 0 44-20 6 % OINT Apply topically montelukast (SINGULAIR) 10 mg tablet Take 10 mg by mouth      polyethylene glycol (GLYCOLAX) 17 GM/SCOOP powder Take 17 g by mouth      tamsulosin (FLOMAX) 0 4 mg Take 0 8 mg by mouth       !! - Potential duplicate medications found  Please discuss with provider  No discharge procedures on file      PDMP Review     None          ED Provider  Electronically Signed by           Darryl Hilario PA-C  05/24/23 8415

## 2023-05-24 NOTE — H&P
[facility]  H&P  Name: Luana Mccoy 66 y o  male I MRN: [id number]  Unit/Bed#: W -01 I Date of Admission: 5/24/2023   Date of Service: 5/24/2023 I Hospital Day: 0      Assessment/Plan   * Small bowel obstruction, partial ([address] )  Assessment & Plan  · CTAP in ED concerning for small bowel obstruction  · Patient had bowel movements day of admission and is still passing gas  · Evaluated by surgery, felt to be due to gastroenteritis vs full bowel obstruction  · No prior abdominal surgeries    Plan:  · Continue NG tube to suction  · N p o   · Will need speech eval prior to initiating oral intake  · Serial abdominal exam  · Tentative plan for repeat CT AP with oral contrast tomorrow    Chronic pleural effusion  Assessment & Plan  · Per discussion with patient's RN  Patient had an ED visit at [facility] in August 2022 for a rule out ACS  CT chest obtained at that time had no lesions or abnormalities  · Has subsequently developed right-sided pleural effusions  He has had 3 thoracenteses all draining greater than 1500 cc with negative cytologies  RN unsure whether transudative or exudative  · Patient does follow with pulmonology at [facility]      Plan:  IR consult for thoracentesis  Labs ordered  will order serum LDH for a m  labs tomorrow    Aspiration pneumonitis [facility])  Assessment & Plan  · Caregiver reports patient eats very rapidly and often coughs/chokes during eating  · Rhonchorous breath sounds in the emergency department  · Received dose of antibiotics in the emergency department    Plan:  · Supplemental O2 as needed for O2 saturation greater than 94%  · Further antibiotics for now  · DuoNebs every 6 hours  · Monitor WBC and fever curve  · Procalcitonin in a m , although confounded from pleural effusion    Esophagitis  Assessment & Plan  Evidence of esophagitis on CT  No noted history    Plan:  add PPI  Patient does have appointment with outpatient GI for issue with chronic diarrhea, can follow-up bout this esophagitis at that time    Pancreatic abnormality  Assessment & Plan  · Incidentally noted pancreatic tail nodule  · Non-urgent MRI recommended    LUPIS (obstructive sleep apnea)  Assessment & Plan  · Newly diagnosed via sleep study  · Not currently using BiPAP  · BiPAP cannot be used while patient has NG tube in    Essential hypertension  Assessment & Plan  Monitor off of antihypertensives while n p o  Can add as needed medication if becomes hypertensive    Bipolar disorder ([facility] [address] )  Assessment & Plan  · Patient is n p o , will switch risperidone twice daily to oral disintegrating tablets  · We will switch Depakote to IV (250 mg every 6 hours, per pharmacy)       VTE Pharmacologic Prophylaxis: VTE Score: 5 High Risk (Score >/= 5) - Pharmacological DVT Prophylaxis Ordered: enoxaparin (Lovenox)  Sequential Compression Devices Ordered  Code Status: Level 1 - Full Code   Discussion with family: Updated  ( and RN) via phone  Anticipated Length of Stay: Patient will be admitted on an inpatient basis with an anticipated length of stay of greater than 2 midnights secondary to Small bowel obstruction, aspiration pneumonitis  Chief Complaint: Abdominal pain, nausea, vomiting, coughing, chest congestion, fever    History of Present Illness:  Nikki Jones is a 66 y o  male with a PMH of bipolar disorder and cognitive impairment, HTN, mild persistent asthma, LUPIS, BPH, chronic diarrhea, who presents with abdominal pain, nausea, vomiting, coughing, and chest congestion with fever  Has been having abdominal pain and nausea for the past 2 weeks but over the past 2 days has had worsening abdominal pain  Patient has chronic diarrheal incontinence and is followed by GI as an outpatient but recently moved to the area and needs to establish with GI here  Patient had bilious vomiting in the ED and 3 bowel movements day of admission    Is still "passing gas  No reported surgeries in the past or history of bowel obstruction  Per patient's caregiver at the bedside, patient is very rapidly and often coughs/chokes while eating  Rhonchorous breath sounds in the emergency department  Of note, patient has chronic right-sided pleural effusion  Per discussion with patient's facility RN, he has had 3 thoracenteses all with greater than 1 5 L of output and negative cytologies  Unsure if whether transudative or exudative  Unclear etiology at this time  Does follow with pulmonology as an outpatient at [facility]  Lab work relatively unremarkable  Vital signs stable  Slightly elevated temperature 100 2 °F   Not meet SIRS/sepsis criteria  Review of Systems:  Review of Systems    Past Medical and Surgical History:   History reviewed  No pertinent past medical history  History reviewed  No pertinent surgical history  Meds/Allergies:  Prior to Admission medications    Not on File     I have reveiwed home medications using records provided by [facility]  Allergies: Allergies   Allergen Reactions   • Bee Venom Anaphylaxis, Hives and Swelling       Social History:  Marital Status: Single   Occupation: none  Patient Pre-hospital Living Situation: [facility]  Patient Pre-hospital Level of Mobility: walks  Patient Pre-hospital Diet Restrictions: none  Substance Use History:   Social History     Substance and Sexual Activity   Alcohol Use None     Social History     Tobacco Use   Smoking Status Not on file   Smokeless Tobacco Not on file     Social History     Substance and Sexual Activity   Drug Use Not on file       Family History:  History reviewed  No pertinent family history      Physical Exam:     Vitals:   Blood Pressure: 109/84 (05/24/23 1852)  Pulse: 90 (05/24/23 1852)  Temperature: 98 8 °F (37 1 °C) (05/24/23 1852)  Temp Source: Oral (05/24/23 1852)  Respirations: 15 (05/24/23 1852)  Height: 5' 6\" (167 6 cm) (05/24/23 1852)  Weight - Scale: " 120 kg (263 lb 7 2 oz) (05/24/23 1852)  SpO2: 93 % (05/24/23 1852)    Physical Exam  Vitals and nursing note reviewed  Constitutional:       General: He is not in acute distress  Appearance: Normal appearance  He is well-developed  He is not ill-appearing or toxic-appearing  HENT:      Head: Normocephalic and atraumatic  Eyes:      Conjunctiva/sclera: Conjunctivae normal    Cardiovascular:      Rate and Rhythm: Normal rate and regular rhythm  Heart sounds: No murmur heard  Pulmonary:      Effort: Pulmonary effort is normal  No respiratory distress  Breath sounds: Rhonchi present  Abdominal:      General: Abdomen is flat  There is no distension  Palpations: Abdomen is soft  Tenderness: There is no abdominal tenderness  There is no guarding  Comments:   NGT in place   Musculoskeletal:         General: No swelling  Cervical back: Neck supple  Skin:     General: Skin is warm and dry  Capillary Refill: Capillary refill takes less than 2 seconds  Neurological:      General: No focal deficit present  Mental Status: He is alert     Psychiatric:         Behavior: Behavior normal           Additional Data:     Lab Results:  Results from last 7 days   Lab Units 05/24/23  1238   EOS PCT % 0   HEMATOCRIT % 43 5   HEMOGLOBIN g/dL 14 2   LYMPHS PCT % 4*   MONOS PCT % 14*   NEUTROS PCT % 82*   PLATELETS Thousands/uL 287   WBC Thousand/uL 10 27*     Results from last 7 days   Lab Units 05/24/23  1238   ANION GAP mmol/L 8   ALBUMIN g/dL 3 8   ALK PHOS U/L 55   ALT U/L 9   AST U/L 10*   BUN mg/dL 22   CALCIUM mg/dL 9 1   CHLORIDE mmol/L 100   CO2 mmol/L 30   CREATININE mg/dL 1 23   GLUCOSE RANDOM mg/dL 168*   POTASSIUM mmol/L 4 1   SODIUM mmol/L 138   TOTAL BILIRUBIN mg/dL 0 53     Results from last 7 days   Lab Units 05/24/23  1238   INR  1 12             Results from last 7 days   Lab Units 05/24/23  1238   LACTIC ACID mmol/L 1 4   PROCALCITONIN ng/ml 0 06 Lines/Drains:  Invasive Devices     Peripheral Intravenous Line  Duration           Peripheral IV 05/24/23 Distal;Right;Upper;Ventral (anterior) Arm <1 day          Drain  Duration           NG/OG/Enteral Tube Nasogastric 16 Fr Right nare <1 day                    Imaging: Reviewed radiology reports from this admission including: chest xray and abdominal/pelvic CT  XR chest 1 view portable   ED Interpretation by Billy Rowe PA-C (05/24 7551)   NG tube in optimal position  Unchanged cardiopulmonary process from previous  CT abdomen pelvis with contrast   Final Result by Estrellita Culp MD (05/24 4360)      1  Small bowel obstruction with transition point in the right lower quadrant  Etiology unclear in the absence of prior surgical history that would result in adhesions  Evaluation of the distal/terminal ileum is limited due to underdistention but    difficult to exclude some infectious or inflammatory wall thickening of the terminal ileum, which would raise the possibility of inflammatory bowel disease such as Crohn's disease with stricture  Obstruction due to occult small bowel mass is also in the    differential although no discrete mass is seen  2   Rounded noncalcified hyperattenuating structure in the pancreatic tail region measuring 1 5 x 1 6 cm which may be due to a tumor nodule (i e  neuroendocrine tumor), intrapancreatic splenule, or a noncalcified aneurysm although incompletely    characterized on this single phase study  Recommend nonemergent contrast-enhanced MR abdomen  3   Large right pleural effusion with near complete right lower lobe collapse  4   Diffuse esophageal wall thickening (i e  esophagitis)  Fluid within the distal esophagus can be due to reflux  Correlation with endoscopy can be considered           I personally discussed this study with Luís Bryson on 5/24/2023 4:25 PM                   Workstation performed: [id number]         XR chest 1 view portable   ED Interpretation by Yan Hicks PA-C (05/24 5306)   Right pleural effusion, with possible consolidation in right middle lobe  Final Result by Mana Preston MD (05/24 6790)      Right basilar opacity with associated effusion, possibly pneumonia  This report is in agreement with the preliminary interpretation  Workstation performed: [id number]         IR IN-Patient Thoracentesis    (Results Pending)       EKG and Other Studies Reviewed on Admission:   · EKG: NSR  HR 85     ** Please Note: This note has been constructed using a voice recognition system   **

## 2023-05-24 NOTE — ASSESSMENT & PLAN NOTE
· CTAP in ED concerning for small bowel obstruction  · Patient had bowel movements day of admission and is still passing gas  · Evaluated by surgery, felt to be due to gastroenteritis vs full bowel obstruction  · No prior abdominal surgeries    Plan:  · Continue NG tube to suction  · N p o   · Will need speech eval prior to initiating oral intake  · Serial abdominal exam  · Tentative plan for repeat CT AP with oral contrast tomorrow

## 2023-05-24 NOTE — ASSESSMENT & PLAN NOTE
· Per discussion with patient's RN  Patient had an ED visit at [facility] in August 2022 for a rule out ACS  CT chest obtained at that time had no lesions or abnormalities  · Has subsequently developed right-sided pleural effusions  He has had 3 thoracenteses all draining greater than 1500 cc with negative cytologies  RN unsure whether transudative or exudative  · Patient does follow with pulmonology at [facility]      Plan:  IR consult for thoracentesis  Labs ordered  will order serum LDH for a m  labs tomorrow

## 2023-05-25 ENCOUNTER — APPOINTMENT (INPATIENT)
Dept: CT IMAGING | Facility: HOSPITAL | Age: 58
End: 2023-05-25

## 2023-05-25 ENCOUNTER — APPOINTMENT (INPATIENT)
Dept: RADIOLOGY | Facility: HOSPITAL | Age: 58
End: 2023-05-25

## 2023-05-25 PROBLEM — D64.9 ACUTE ANEMIA: Status: ACTIVE | Noted: 2023-05-25

## 2023-05-25 PROBLEM — N17.9 AKI (ACUTE KIDNEY INJURY) (HCC): Status: ACTIVE | Noted: 2023-05-25

## 2023-05-25 LAB
ALBUMIN SERPL BCP-MCNC: 2.9 G/DL (ref 3.5–5)
ALP SERPL-CCNC: 38 U/L (ref 34–104)
ALT SERPL W P-5'-P-CCNC: 6 U/L (ref 7–52)
ANION GAP SERPL CALCULATED.3IONS-SCNC: 7 MMOL/L (ref 4–13)
APPEARANCE FLD: CLEAR
AST SERPL W P-5'-P-CCNC: 8 U/L (ref 13–39)
BACTERIA UR QL AUTO: ABNORMAL /HPF
BASOPHILS # BLD AUTO: 0.03 THOUSANDS/ÂΜL (ref 0–0.1)
BASOPHILS NFR BLD AUTO: 0 % (ref 0–1)
BILIRUB DIRECT SERPL-MCNC: 0.1 MG/DL (ref 0–0.2)
BILIRUB SERPL-MCNC: 0.35 MG/DL (ref 0.2–1)
BILIRUB UR QL STRIP: NEGATIVE
BUN SERPL-MCNC: 33 MG/DL (ref 5–25)
CALCIUM SERPL-MCNC: 7.8 MG/DL (ref 8.4–10.2)
CHLORIDE SERPL-SCNC: 105 MMOL/L (ref 96–108)
CLARITY UR: CLEAR
CO2 SERPL-SCNC: 29 MMOL/L (ref 21–32)
COLOR FLD: YELLOW
COLOR UR: YELLOW
CREAT SERPL-MCNC: 2.03 MG/DL (ref 0.6–1.3)
EOSINOPHIL # BLD AUTO: 0.01 THOUSAND/ÂΜL (ref 0–0.61)
EOSINOPHIL NFR BLD AUTO: 0 % (ref 0–6)
EOSINOPHIL NFR FLD MANUAL: 2 %
ERYTHROCYTE [DISTWIDTH] IN BLOOD BY AUTOMATED COUNT: 14 % (ref 11.6–15.1)
GFR SERPL CREATININE-BSD FRML MDRD: 35 ML/MIN/1.73SQ M
GLUCOSE FLD-MCNC: 91 MG/DL
GLUCOSE SERPL-MCNC: 97 MG/DL (ref 65–140)
GLUCOSE UR STRIP-MCNC: NEGATIVE MG/DL
HCT VFR BLD AUTO: 33.7 % (ref 36.5–49.3)
HCT VFR BLD AUTO: 35.3 % (ref 36.5–49.3)
HGB BLD-MCNC: 10.8 G/DL (ref 12–17)
HGB BLD-MCNC: 10.9 G/DL (ref 12–17)
HGB UR QL STRIP.AUTO: NEGATIVE
HISTIOCYTES NFR FLD: 7 %
IMM GRANULOCYTES # BLD AUTO: 0.04 THOUSAND/UL (ref 0–0.2)
IMM GRANULOCYTES NFR BLD AUTO: 1 % (ref 0–2)
KETONES UR STRIP-MCNC: NEGATIVE MG/DL
LDH FLD L TO P-CCNC: 190 U/L
LDH SERPL-CCNC: 94 U/L (ref 140–271)
LEUKOCYTE ESTERASE UR QL STRIP: NEGATIVE
LYMPHOCYTES # BLD AUTO: 1.4 THOUSANDS/ÂΜL (ref 0.6–4.47)
LYMPHOCYTES NFR BLD AUTO: 18 % (ref 14–44)
LYMPHOCYTES NFR BLD AUTO: 82 %
MCH RBC QN AUTO: 31.5 PG (ref 26.8–34.3)
MCHC RBC AUTO-ENTMCNC: 32 G/DL (ref 31.4–37.4)
MCV RBC AUTO: 98 FL (ref 82–98)
MONOCYTES # BLD AUTO: 1.29 THOUSAND/ÂΜL (ref 0.17–1.22)
MONOCYTES NFR BLD AUTO: 17 % (ref 4–12)
MONOCYTES NFR BLD AUTO: 9 %
MUCOUS THREADS UR QL AUTO: ABNORMAL
NEUTROPHILS # BLD AUTO: 5.03 THOUSANDS/ÂΜL (ref 1.85–7.62)
NEUTS SEG NFR BLD AUTO: 64 % (ref 43–75)
NITRITE UR QL STRIP: NEGATIVE
NON-SQ EPI CELLS URNS QL MICRO: ABNORMAL /HPF
NRBC BLD AUTO-RTO: 0 /100 WBCS
PH BODY FLUID: 7.7
PH UR STRIP.AUTO: 5.5 [PH]
PLATELET # BLD AUTO: 230 THOUSANDS/UL (ref 149–390)
PMV BLD AUTO: 10 FL (ref 8.9–12.7)
POTASSIUM SERPL-SCNC: 4.3 MMOL/L (ref 3.5–5.3)
PROCALCITONIN SERPL-MCNC: 0.18 NG/ML
PROT FLD-MCNC: 4 G/DL
PROT SERPL-MCNC: 5.5 G/DL (ref 6.4–8.4)
PROT UR STRIP-MCNC: ABNORMAL MG/DL
RBC # BLD AUTO: 3.43 MILLION/UL (ref 3.88–5.62)
RBC #/AREA URNS AUTO: ABNORMAL /HPF
SITE: NORMAL
SODIUM SERPL-SCNC: 141 MMOL/L (ref 135–147)
SP GR UR STRIP.AUTO: >=1.05 (ref 1–1.03)
TOTAL CELLS COUNTED SPEC: 100
UROBILINOGEN UR STRIP-ACNC: <2 MG/DL
WBC # BLD AUTO: 7.8 THOUSAND/UL (ref 4.31–10.16)
WBC # FLD MANUAL: 1559 /UL
WBC #/AREA URNS AUTO: ABNORMAL /HPF

## 2023-05-25 PROCEDURE — 0W993ZZ DRAINAGE OF RIGHT PLEURAL CAVITY, PERCUTANEOUS APPROACH: ICD-10-PCS | Performed by: RADIOLOGY

## 2023-05-25 RX ORDER — LIDOCAINE HYDROCHLORIDE 10 MG/ML
INJECTION, SOLUTION EPIDURAL; INFILTRATION; INTRACAUDAL; PERINEURAL AS NEEDED
Status: COMPLETED | OUTPATIENT
Start: 2023-05-25 | End: 2023-05-25

## 2023-05-25 RX ORDER — SODIUM CHLORIDE 9 MG/ML
125 INJECTION, SOLUTION INTRAVENOUS CONTINUOUS
Status: DISCONTINUED | OUTPATIENT
Start: 2023-05-25 | End: 2023-05-26

## 2023-05-25 RX ORDER — METOPROLOL TARTRATE 5 MG/5ML
2.5 INJECTION INTRAVENOUS EVERY 6 HOURS PRN
Status: DISCONTINUED | OUTPATIENT
Start: 2023-05-25 | End: 2023-05-28

## 2023-05-25 RX ADMIN — VALPROATE SODIUM 250 MG: 100 INJECTION, SOLUTION INTRAVENOUS at 05:22

## 2023-05-25 RX ADMIN — LIDOCAINE HYDROCHLORIDE 10 ML: 10 INJECTION, SOLUTION EPIDURAL; INFILTRATION; INTRACAUDAL at 13:18

## 2023-05-25 RX ADMIN — VALPROATE SODIUM 250 MG: 100 INJECTION, SOLUTION INTRAVENOUS at 01:29

## 2023-05-25 RX ADMIN — PANTOPRAZOLE SODIUM 40 MG: 40 INJECTION, POWDER, FOR SOLUTION INTRAVENOUS at 10:15

## 2023-05-25 RX ADMIN — VALPROATE SODIUM 250 MG: 100 INJECTION, SOLUTION INTRAVENOUS at 17:27

## 2023-05-25 RX ADMIN — SODIUM CHLORIDE 125 ML/HR: 0.9 INJECTION, SOLUTION INTRAVENOUS at 05:20

## 2023-05-25 RX ADMIN — RISPERIDONE 1.5 MG: 0.5 TABLET, ORALLY DISINTEGRATING ORAL at 17:27

## 2023-05-25 RX ADMIN — VALPROATE SODIUM 250 MG: 100 INJECTION, SOLUTION INTRAVENOUS at 11:22

## 2023-05-25 RX ADMIN — SODIUM CHLORIDE 125 ML/HR: 0.9 INJECTION, SOLUTION INTRAVENOUS at 16:49

## 2023-05-25 RX ADMIN — RISPERIDONE 1.5 MG: 0.5 TABLET, ORALLY DISINTEGRATING ORAL at 08:41

## 2023-05-25 RX ADMIN — IOHEXOL 50 ML: 240 INJECTION, SOLUTION INTRATHECAL; INTRAVASCULAR; INTRAVENOUS; ORAL at 08:00

## 2023-05-25 NOTE — SEDATION DOCUMENTATION
US guided right thoracentesis completed for 1800ml clear yellow fluid  Labs sent as ordered  Bandaid to site CDI

## 2023-05-25 NOTE — ASSESSMENT & PLAN NOTE
· Creatinine jumped from 1 2 on admission to 2 today  · Was not on fluids overnight    Plan:  ·  mL/h  · Trend Cr

## 2023-05-25 NOTE — PLAN OF CARE
Problem: Prexisting or High Potential for Compromised Skin Integrity  Goal: Skin integrity is maintained or improved  Description: INTERVENTIONS:  - Identify patients at risk for skin breakdown  - Assess and monitor skin integrity  - Assess and monitor nutrition and hydration status  - Monitor labs   - Assess for incontinence   - Turn and reposition patient  - Assist with mobility/ambulation  - Relieve pressure over bony prominences  - Avoid friction and shearing  - Provide appropriate hygiene as needed including keeping skin clean and dry  - Evaluate need for skin moisturizer/barrier cream  - Collaborate with interdisciplinary team   - Patient/family teaching  - Consider wound care consult   Outcome: Progressing     Problem: PAIN - ADULT  Goal: Verbalizes/displays adequate comfort level or baseline comfort level  Description: Interventions:  - Encourage patient to monitor pain and request assistance  - Assess pain using appropriate pain scale  - Administer analgesics based on type and severity of pain and evaluate response  - Implement non-pharmacological measures as appropriate and evaluate response  - Consider cultural and social influences on pain and pain management  - Notify physician/advanced practitioner if interventions unsuccessful or patient reports new pain  Outcome: Progressing     Problem: SAFETY ADULT  Goal: Maintain or return to baseline ADL function  Description: INTERVENTIONS:  -  Assess patient's ability to carry out ADLs; assess patient's baseline for ADL function and identify physical deficits which impact ability to perform ADLs (bathing, care of mouth/teeth, toileting, grooming, dressing, etc )  - Assess/evaluate cause of self-care deficits   - Assess range of motion  - Assess patient's mobility; develop plan if impaired  - Assess patient's need for assistive devices and provide as appropriate  - Encourage maximum independence but intervene and supervise when necessary  - Involve family in performance of ADLs  - Assess for home care needs following discharge   - Consider OT consult to assist with ADL evaluation and planning for discharge  - Provide patient education as appropriate  Outcome: Progressing

## 2023-05-25 NOTE — ASSESSMENT & PLAN NOTE
· Per discussion with patient's RN  Patient had an ED visit at [facility] in August 2022 for a rule out ACS  CT chest obtained at that time had no lesions or abnormalities  · Has subsequently developed right-sided pleural effusions  He has had 3 thoracenteses all draining greater than 1500 cc with negative cytologies  RN unsure whether transudative or exudative  · Patient does follow with pulmonology at [facility]      Plan:  IR consult for thoracentesis  Labs ordered

## 2023-05-25 NOTE — PROGRESS NOTES
[facility]  Progress Note  Name: Mayank Meyer  MRN: [id number]  Unit/Bed#: W -01 I Date of Admission: 5/24/2023   Date of Service: 5/25/2023 I Hospital Day: 1    Assessment/Plan   * Small bowel obstruction, partial ([address] )  Assessment & Plan  · CTAP in ED concerning for small bowel obstruction  · Patient had bowel movements day of admission and is still passing gas  · Evaluated by surgery, felt to be due to gastroenteritis vs full bowel obstruction  · No prior abdominal surgeries    Plan:  · Continue NG tube to suction  · N p o   · Will need speech eval prior to initiating oral intake  · Serial abdominal exam  · CT AP with oral contrast today    Chronic pleural effusion  Assessment & Plan  · Per discussion with patient's RN  Patient had an ED visit at [facility] in August 2022 for a rule out ACS  CT chest obtained at that time had no lesions or abnormalities  · Has subsequently developed right-sided pleural effusions  He has had 3 thoracenteses all draining greater than 1500 cc with negative cytologies  RN unsure whether transudative or exudative  · Patient does follow with pulmonology at [facility]  Plan:  IR consult for thoracentesis  Labs ordered    Aspiration pneumonitis [facility])  Assessment & Plan  · Caregiver reports patient eats very rapidly and often coughs/chokes during eating  · Rhonchorous breath sounds in the emergency department  · Received dose of antibiotics in the emergency department  · WBC downtrending  Procalcitonin elevated but still WNL  Afebrile  Plan:  · Supplemental O2 as needed for O2 saturation greater than 94%  · Further antibiotics for now  · DuoNebs every 6 hours    Acute anemia  Assessment & Plan  · Hemoglobin dropped from 14 2 on admission to 10 8 this morning  · No clear source of bleeding  No hematochezia/melena, hematuria, hemoptysis, hematemesis, hemolysis  · Possible delayed drop?  NGT output was dark yesterday, clear this morning  · Vital signs stable  Plan  · will check H&H today @ noon  · Hold Lovenox this morning  · Monitor vitals closely    JENNA (acute kidney injury) ([facility] )  Assessment & Plan  · Creatinine jumped from 1 2 on admission to 2 today  · Was not on fluids overnight    Plan:  ·  mL/h  · Trend Cr    Esophagitis  Assessment & Plan  Evidence of esophagitis on CT  No noted history    Plan:  PPI  Patient does have appointment with outpatient GI for issue with chronic diarrhea, can follow-up bout this esophagitis at that time    Pancreatic abnormality  Assessment & Plan  · Incidentally noted pancreatic tail nodule  · Non-urgent MRI recommended    LUPIS (obstructive sleep apnea)  Assessment & Plan  · Newly diagnosed via sleep study  · Not currently using BiPAP  · BiPAP cannot be used while patient has NG tube in    Mild persistent asthma without complication  Assessment & Plan  · Hold Singulair in the setting of n p o  status  · Continue DuoNebs    Bipolar disorder ([facility] )  Assessment & Plan  · Patient is n p o , will switch risperidone twice daily to oral disintegrating tablets  · We will switch Depakote to IV (250 mg every 6 hours, per pharmacy)           VTE Pharmacologic Prophylaxis: VTE Score: 5 High Risk (Score >/= 5) - Pharmacological DVT Prophylaxis Contraindicated  Sequential Compression Devices Ordered  Patient Centered Rounds: I performed bedside rounds with nursing staff today  Discussions with Specialists or Other Care Team Provider: surgery    Education and Discussions with Family / Patient: Updated  () via phone  Current Length of Stay: 1 day(s)  Current Patient Status: Inpatient   Discharge Plan: TBD pending resolution of possible bowel obstruction  at least 24-48 hours  Code Status: Level 1 - Full Code    Subjective:   No acute events overnight  Patient denies physical complaints today  Denies any nausea or vomiting  No abdominal pain/discomfort    Has not had any bowel movements but reports that he is passing gas  Patient reports that he is hungry  Objective:     Vitals:   Temp (24hrs), Av 2 °F (37 3 °C), Min:98 1 °F (36 7 °C), Max:100 2 °F (37 9 °C)    Temp:  [98 1 °F (36 7 °C)-100 2 °F (37 9 °C)] 98 1 °F (36 7 °C)  HR:  [73-90] 75  Resp:  [15-26] 17  BP: (104-158)/(55-89) 121/64  SpO2:  [91 %-96 %] 95 %  Body mass index is 42 52 kg/m²  Input and Output Summary (last 24 hours): Intake/Output Summary (Last 24 hours) at 2023 0647  Last data filed at 2023 1700  Gross per 24 hour   Intake 1300 ml   Output 1500 ml   Net -200 ml       Physical Exam:   Physical Exam  Vitals and nursing note reviewed  Constitutional:       General: He is not in acute distress  Appearance: Normal appearance  He is well-developed  He is not ill-appearing or toxic-appearing  HENT:      Head: Normocephalic and atraumatic  Eyes:      Conjunctiva/sclera: Conjunctivae normal    Cardiovascular:      Rate and Rhythm: Normal rate and regular rhythm  Heart sounds: No murmur heard  Pulmonary:      Effort: Pulmonary effort is normal  No respiratory distress  Breath sounds: Rhonchi (Improved from last night) present  Abdominal:      General: Abdomen is flat  Bowel sounds are normal  There is no distension  Palpations: Abdomen is soft  Tenderness: There is no abdominal tenderness  There is no guarding  Comments:   NGT in place with clear output in canister   Musculoskeletal:         General: No swelling  Cervical back: Neck supple  Right lower leg: No edema  Left lower leg: No edema  Skin:     General: Skin is warm and dry  Capillary Refill: Capillary refill takes less than 2 seconds  Neurological:      General: No focal deficit present  Mental Status: He is alert     Psychiatric:         Mood and Affect: Mood normal          Behavior: Behavior normal           Additional Data:     Labs:  Results from last 7 days Lab Units 05/25/23  0443   EOS PCT % 0   HEMATOCRIT % 33 7*   HEMOGLOBIN g/dL 10 8*   LYMPHS PCT % 18   MONOS PCT % 17*   NEUTROS PCT % 64   PLATELETS Thousands/uL 230   WBC Thousand/uL 7 80     Results from last 7 days   Lab Units 05/25/23  0443   ANION GAP mmol/L 7   ALBUMIN g/dL 2 9*   ALK PHOS U/L 38   ALT U/L 6*   AST U/L 8*   BUN mg/dL 33*   CALCIUM mg/dL 7 8*   CHLORIDE mmol/L 105   CO2 mmol/L 29   CREATININE mg/dL 2 03*   GLUCOSE RANDOM mg/dL 97   POTASSIUM mmol/L 4 3   SODIUM mmol/L 141   TOTAL BILIRUBIN mg/dL 0 35     Results from last 7 days   Lab Units 05/24/23  1238   INR  1 12             Results from last 7 days   Lab Units 05/25/23  0443 05/24/23  1238   LACTIC ACID mmol/L  --  1 4   PROCALCITONIN ng/ml 0 18 0 06       Lines/Drains:  Invasive Devices     Peripheral Intravenous Line  Duration           Peripheral IV 05/24/23 Distal;Right;Upper;Ventral (anterior) Arm <1 day          Drain  Duration           NG/OG/Enteral Tube Nasogastric 16 Fr Right nare <1 day                      Imaging: No pertinent imaging reviewed  Recent Cultures (last 7 days):   Results from last 7 days   Lab Units 05/24/23  1238   BLOOD CULTURE  Received in Microbiology Lab  Culture in Progress  Received in Microbiology Lab  Culture in Progress  Last 24 Hours Medication List:   Current Facility-Administered Medications   Medication Dose Route Frequency Provider Last Rate   • pantoprazole  40 mg Intravenous Q24H [address] MD Idris     • risperiDONE  1 5 mg Oral BID Grady Nino MD     • sodium chloride  125 mL/hr Intravenous Continuous Archana GEORGE Bartjustina,  mL/hr (05/25/23 0520)   • valproate sodium  250 mg Intravenous HOSP CLAUDE DE HATO VARSHA Grady Nino  mg (05/25/23 0522)        Today, Patient Was Seen By: Disha Watts MD    **Please Note: This note may have been constructed using a voice recognition system  **

## 2023-05-25 NOTE — ASSESSMENT & PLAN NOTE
· CTAP in ED concerning for small bowel obstruction  · Patient had bowel movements day of admission and is still passing gas  · Evaluated by surgery, felt to be due to gastroenteritis vs full bowel obstruction  · No prior abdominal surgeries    Plan:  · Continue NG tube to suction  · N p o   · Will need speech eval prior to initiating oral intake  · Serial abdominal exam  · CT AP with oral contrast today

## 2023-05-25 NOTE — PROGRESS NOTES
"General Surgery  Progress Note   Jacinda Ordonez 66 y o  male MRN: [id number]  Unit/Bed#: W -01 Encounter: [id number]    Assessment:  Jacinda Ordonez is a 66 y o  male presented with signs of gastroenteritis  A CT scan showed possible bowel obstruction with transition point in the RLQ  He was passing gas and BMs yesterday, however, a CT AP with PO contrast will help delineate if there is obstruction or if this presentation is more likely gastroenteritis  Plan:  - CT AP w PO contrast  - continue NPO with NG tube to suction  - continue IV fluids while NPO  - trend WBC and Cr  - No current indication for surgery  - Please TT the RA Surgery Resident or AP on call with questions or concerns    Subjective: Patient seen and examined at bedside, in no acute distress  No acute events overnight  Patient's pain is well controlled  Denies nausea or vomiting  Passing gas, no bowel movement overnight  Objective:     Vitals:Blood pressure 121/64, pulse 75, temperature 98 1 °F (36 7 °C), resp  rate 17, height 5' 6\" (1 676 m), weight 120 kg (263 lb 7 2 oz), SpO2 95 %  ,Body mass index is 42 52 kg/m²       Temp (24hrs), Av 2 °F (37 3 °C), Min:98 1 °F (36 7 °C), Max:100 2 °F (37 9 °C)  Current: Temperature: 98 1 °F (36 7 °C)      Intake/Output Summary (Last 24 hours) at 2023 0719  Last data filed at 2023 1700  Gross per 24 hour   Intake 1300 ml   Output 1500 ml   Net -200 ml       Invasive Devices     Peripheral Intravenous Line  Duration           Peripheral IV 23 Distal;Right;Upper;Ventral (anterior) Arm <1 day          Drain  Duration           NG/OG/Enteral Tube Nasogastric 16 Fr Right nare <1 day                Physical Exam:  Gen: Lying comfortably in bed, no acute distress  HEENT: NG tube to suction  CV: Regular rate and rhythm  Pulm: Breathing comfortably on room air, no respiratory distress  Abd: Soft, wildly distended, no tenderness to palpation  Ext: Warm and dry  Neuro: Alert and " oriented    Lab Results: Results: I have personally reviewed all pertinent laboratory/tests results

## 2023-05-25 NOTE — SPEECH THERAPY NOTE
Speech Language/Pathology    Speech-Language Pathology Bedside Swallow Evaluation        Patient Name: Izzy Whitmore    Today's Date: 5/25/2023     Problem List  Principal Problem:    Small bowel obstruction, partial ([facility] [address] )  Active Problems:    Bipolar disorder ([facility] [address] )    Essential hypertension    Mild persistent asthma without complication    LUPIS (obstructive sleep apnea)    Recurrent pleural effusion on right    Esophagitis    Pancreatic abnormality    Aspiration pneumonitis (HCC)    JENNA (acute kidney injury) ([facility] [address] )    Acute anemia         Past Medical History  History reviewed  No pertinent past medical history  Past Surgical History  History reviewed  No pertinent surgical history  Summary   Pt presents with pharyngeal dysphagia characterized by wet cough immediately following trials of CLD (thin & thick)  ST to repeat clinical bedside evaluation and/or recommend instrumental testing  Recommendations:   Diet: NPO except medications   Meds: as tolerated   Frequent Oral care: 4x/day  Aspiration precautions and compensatory swallowing strategies: upright posture         Current Medical Status  Pt is a 66 y o  male who presented to [facility] on May 24, 2023 with concerns of chest congestion, abdominal pain, diarrhea, nausea, thick sputum, fever  Patient also complains of some chills  Patient has a history of bipolar for which she is on multiple medications, allergies as well  History of reflexes to be benign  No recent exposures  States that the chest congestion and cough have been ongoing for the last 2 days slowly worsening  States that the abdominal pain has been ongoing for about 2 weeks, diarrhea for the last couple days  Denies any vomiting however has been nauseous over the last couple days  Denies any leg swelling or chest pain although does admit to some chest tightness  No shortness of breath at this time  No history of asthma or COPD    Lives with a roommate, checked in with by adult services multiple times a week  Patient's caregiver states that he typically does not look this bad  Hx of cognitive impairment         Past medical history:  Please see H&P for details    Special Studies:  05/24/23 Chest XR IMPRESSION: Persistent right basilar opacity, likely due to a combination of atelectasis and moderate pleural effusion although pneumonic airspace consolidation is not excluded  05/24/23 CT Abdomen IMPRESSION: 1  Small bowel obstruction with transition point in the right lower quadrant  Etiology unclear in the absence of prior surgical history that would result in adhesions  Evaluation of the distal/terminal ileum is limited due to underdistention but difficult to exclude some infectious or inflammatory wall thickening of the terminal ileum, which would raise the possibility of inflammatory bowel disease such as Crohn's disease with stricture  Obstruction due to occult small bowel mass is also in the differential although no discrete mass is seen  2  Rounded noncalcified hyperattenuating structure in the pancreatic tail region measuring 1 5 x 1 6 cm which may be due to a tumor nodule (i e  neuroendocrine tumor), intrapancreatic splenule, or a noncalcified aneurysm although incompletely characterized on this single phase study  Recommend nonemergent contrast-enhanced MR abdomen  3  Large right pleural effusion with near complete right lower lobe collapse  4  Diffuse esophageal wall thickening (i e  esophagitis)  Fluid within the distal esophagus can be due to reflux  Correlation with endoscopy can be considered    01/20/23: Per review of records in Care Everywhere, pt went to urgent care for questionable aspiration event secondary to choking episode (coughing excessive on jello)     Social/Education/Vocational Hx:  Pt lives in group home    Swallow Information   Current Risks for Dysphagia & Aspiration: PNA  Current Symptoms/Concerns: PNA, wet cough  Current Diet: NPO   Baseline Diet: regular diet and thin liquids  Takes pills: whole with water    Baseline Assessment   Behavior/Cognition: alert  Speech/Language Status: able to participate in conversation and able to follow commands  Patient Positioning: upright in bed     Swallow Mechanism Exam   Facial: symmetrical  Labial: WFL  Lingual: WFL  Velum: symmetrical  Mandible: adequate ROM  Dentition: edentulous; dentures at home per pt  Vocal quality:clear/adequate   Volitional Cough: strong/productive/wet  Respiratory: Room Air    Consistencies Assessed and Performance   Consistencies Administered: thin liquids and nectar thick -- pt cleared for CLD at this time only per MD/sx  NG tube pulled today at 12:46  Clinician seen for bedside swallow evaluation following thoracentesis completed for 1800 ml of clear yellow fluid  Oral Stage: Pt was able to adequately retreive bolus from straw, cup, and spoon without anterior leakage  Pt with prompt oral transfer of bolus  No residue remained in the oral cavity s/p swallows  Pharyngeal Stage: Complete laryngeal elevation noted via palpation of the swallow  Swallow initiation prompt  Immediate wet coughing with intermittent change in vocal quality noted during PO intake  Pt with wet cough at baseline but exacerbated with PO intake  Esophageal Concerns: Pt admits to food feeling stuck in his chest sometimes    Results Reviewed with: patient and RN   Dysphagia Goals: pt will participate in repeat swallow eval to determine safety of po intake and/or further instrumental testing    Discharge recommendation: TBD

## 2023-05-25 NOTE — PROGRESS NOTES
"Progress Note - General Surgery   Digna Ruano 66 y o  male MRN: [id number]  Unit/Bed#: W -01 Encounter: [id number]    Assessment:  Digna Ruano is a 66 y o  male presented with signs of gastroenteritis  A CT scan showed possible bowel obstruction with transition point in the RLQ  He was passing gas and BMs yesterday, however, a CT AP with PO contrast will help delineate if there is obstruction or if this presentation is more likely gastroenteritis  CT with p o  contrast showing contrast passed into the colon    Afebrile, Stable VS on room air  Plan:  -No signs of obstruction  - Diet per speech, can advance as tolerated from surgical perspective  -Surgery will sign off, available for questions as needed      Subjective/Objective     Subjective:   No acute events overnight  Passing flatus and had small bowel movement yesterday  Objective:     Blood pressure 130/63, pulse 70, temperature 98 9 °F (37 2 °C), resp  rate 18, height 5' 6\" (1 676 m), weight 120 kg (263 lb 7 2 oz), SpO2 95 %  ,Body mass index is 42 52 kg/m²        Intake/Output Summary (Last 24 hours) at 5/25/2023 1559  Last data filed at 5/25/2023 1301  Gross per 24 hour   Intake 2290 42 ml   Output 500 ml   Net 1790 42 ml       Invasive Devices     Peripheral Intravenous Line  Duration           Peripheral IV 05/24/23 Distal;Right;Upper;Ventral (anterior) Arm 1 day                Physical Exam:   Gen: NAD, Comfortable  Neuro: A&O, No focal deficits  Head: Normal Cephalic, Atraumatic  Eye: EOMI, PERRLA, No scleral icterus  Neck: Supple, No JVD, Midline trachea  CV: RRR, Cap refill <2 sec  Pulm: Normal work of breathing, no respiratory distress  Abd: Soft, Non-Distended, Non-Tender  Ext: No edema, Non-tender  Skin: warm, dry, intact      "

## 2023-05-25 NOTE — PLAN OF CARE
Problem: MOBILITY - ADULT  Goal: Maintain or return to baseline ADL function  Description: INTERVENTIONS:  -  Assess patient's ability to carry out ADLs; assess patient's baseline for ADL function and identify physical deficits which impact ability to perform ADLs (bathing, care of mouth/teeth, toileting, grooming, dressing, etc )  - Assess/evaluate cause of self-care deficits   - Assess range of motion  - Assess patient's mobility; develop plan if impaired  - Assess patient's need for assistive devices and provide as appropriate  - Encourage maximum independence but intervene and supervise when necessary  - Involve family in performance of ADLs  - Assess for home care needs following discharge   - Consider OT consult to assist with ADL evaluation and planning for discharge  - Provide patient education as appropriate  Outcome: Progressing  Goal: Maintains/Returns to pre admission functional level  Description: INTERVENTIONS:  - Perform BMAT or MOVE assessment daily    - Set and communicate daily mobility goal to care team and patient/family/caregiver     - Collaborate with rehabilitation services on mobility goals if consulted  - Record patient progress and toleration of activity level   Outcome: Progressing     Problem: Prexisting or High Potential for Compromised Skin Integrity  Goal: Skin integrity is maintained or improved  Description: INTERVENTIONS:  - Identify patients at risk for skin breakdown  - Assess and monitor skin integrity  - Assess and monitor nutrition and hydration status  - Monitor labs   - Assess for incontinence   - Turn and reposition patient  - Assist with mobility/ambulation  - Relieve pressure over bony prominences  - Avoid friction and shearing  - Provide appropriate hygiene as needed including keeping skin clean and dry  - Evaluate need for skin moisturizer/barrier cream  - Collaborate with interdisciplinary team   - Patient/family teaching  - Consider wound care consult   Outcome: Progressing     Problem: PAIN - ADULT  Goal: Verbalizes/displays adequate comfort level or baseline comfort level  Description: Interventions:  - Encourage patient to monitor pain and request assistance  - Assess pain using appropriate pain scale  - Administer analgesics based on type and severity of pain and evaluate response  - Implement non-pharmacological measures as appropriate and evaluate response  - Consider cultural and social influences on pain and pain management  - Notify physician/advanced practitioner if interventions unsuccessful or patient reports new pain  Outcome: Progressing     Problem: SAFETY ADULT  Goal: Maintain or return to baseline ADL function  Description: INTERVENTIONS:  -  Assess patient's ability to carry out ADLs; assess patient's baseline for ADL function and identify physical deficits which impact ability to perform ADLs (bathing, care of mouth/teeth, toileting, grooming, dressing, etc )  - Assess/evaluate cause of self-care deficits   - Assess range of motion  - Assess patient's mobility; develop plan if impaired  - Assess patient's need for assistive devices and provide as appropriate  - Encourage maximum independence but intervene and supervise when necessary  - Involve family in performance of ADLs  - Assess for home care needs following discharge   - Consider OT consult to assist with ADL evaluation and planning for discharge  - Provide patient education as appropriate  Outcome: Progressing  Goal: Maintains/Returns to pre admission functional level  Description: INTERVENTIONS:  - Perform BMAT or MOVE assessment daily    - Set and communicate daily mobility goal to care team and patient/family/caregiver     - Collaborate with rehabilitation services on mobility goals if consulted  - Out of bed for toileting  - Record patient progress and toleration of activity level   Outcome: Progressing  Goal: Patient will remain free of falls  Description: INTERVENTIONS:  - Educate patient/family on patient safety including physical limitations  - Instruct patient to call for assistance with activity   - Consult OT/PT to assist with strengthening/mobility   - Keep Call bell within reach  - Keep bed low and locked with side rails adjusted as appropriate  - Keep care items and personal belongings within reach  - Initiate and maintain comfort rounds  - Make Fall Risk Sign visible to staff  - Initiate/Maintain bed alarm  - Apply yellow socks and bracelet for high fall risk patients  - Consider moving patient to room near nurses station  Outcome: Progressing     Problem: DISCHARGE PLANNING  Goal: Discharge to home or other facility with appropriate resources  Description: INTERVENTIONS:  - Identify barriers to discharge w/patient and caregiver  - Arrange for needed discharge resources and transportation as appropriate  - Identify discharge learning needs (meds, wound care, etc )  - Arrange for interpretive services to assist at discharge as needed  - Refer to Case Management Department for coordinating discharge planning if the patient needs post-hospital services based on physician/advanced practitioner order or complex needs related to functional status, cognitive ability, or social support system  Outcome: Progressing     Problem: Knowledge Deficit  Goal: Patient/family/caregiver demonstrates understanding of disease process, treatment plan, medications, and discharge instructions  Description: Complete learning assessment and assess knowledge base    Interventions:  - Provide teaching at level of understanding  - Provide teaching via preferred learning methods  Outcome: Progressing     Problem: RESPIRATORY - ADULT  Goal: Achieves optimal ventilation and oxygenation  Description: INTERVENTIONS:  - Assess for changes in respiratory status  - Assess for changes in mentation and behavior  - Position to facilitate oxygenation and minimize respiratory effort  - Oxygen administered by appropriate delivery if ordered  - Initiate smoking cessation education as indicated  - Encourage broncho-pulmonary hygiene including cough, deep breathe, Incentive Spirometry  - Assess the need for suctioning and aspirate as needed  - Assess and instruct to report SOB or any respiratory difficulty  - Respiratory Therapy support as indicated  Outcome: Progressing

## 2023-05-25 NOTE — DISCHARGE INSTRUCTIONS
Thoracentesis   WHAT YOU NEED TO KNOW:   A thoracentesis is a procedure to remove extra fluid or air from between your lungs and your inner chest wall  Air or fluid buildup may make it hard for you to breathe  A thoracentesis allows your lungs to expand fully so you can breathe more easily  DISCHARGE INSTRUCTIONS:     Small amount of shoulder pain and bloody sputum is normal after a Thoracentesis  Rest:  Rest when you feel it is needed  Slowly start to do more each day  Return to your daily activities as directed  Resume your normal diet  Small sips of flat soda will help mild nausea  Do not smoke: If you smoke, it is never too late to quit  Ask for information about how to stop smoking if you need help  Contact Interventional Radiology at [phone] [place] PATIENTS: Contact Interventional Radiology at [phone]) [place] PATIENTS: Contact Interventional Radiology at [phone]) if:   You have a fever  Your puncture site is red, warm, swollen, or draining pus  You have questions or concerns about your procedure, medicine, or care  Seek care immediately or call 911 if:   Severe chest pain with inspiration and shortness of breath    Large amounts of blood in your sputum    Follow up with your healthcare provider as directed

## 2023-05-25 NOTE — ASSESSMENT & PLAN NOTE
· Caregiver reports patient eats very rapidly and often coughs/chokes during eating  · Rhonchorous breath sounds in the emergency department  · Received dose of antibiotics in the emergency department  · WBC downtrending  Procalcitonin elevated but still WNL  Afebrile      Plan:  · Supplemental O2 as needed for O2 saturation greater than 94%  · Further antibiotics for now  · DuoNebs every 6 hours

## 2023-05-25 NOTE — QUICK NOTE
Called patient's , Sheila Soares  Also had Rosalind De La Torre, facility director, on the line  Gave clinical update  Answered all questions  Facility is unable to accept patient back over the weekend and likely on holiday Monday

## 2023-05-25 NOTE — ASSESSMENT & PLAN NOTE
· Hemoglobin dropped from 14 2 on admission to 10 8 this morning  · No clear source of bleeding  No hematochezia/melena, hematuria, hemoptysis, hematemesis, hemolysis  · Possible delayed drop? NGT output was dark yesterday, clear this morning  · Vital signs stable      Plan  · will check H&H today @ noon  · Hold Lovenox this morning  · Monitor vitals closely

## 2023-05-26 LAB
ALBUMIN SERPL BCP-MCNC: 2.9 G/DL (ref 3.5–5)
ALP SERPL-CCNC: 36 U/L (ref 34–104)
ALT SERPL W P-5'-P-CCNC: 4 U/L (ref 7–52)
ANION GAP SERPL CALCULATED.3IONS-SCNC: 7 MMOL/L (ref 4–13)
AST SERPL W P-5'-P-CCNC: 8 U/L (ref 13–39)
BASOPHILS # BLD AUTO: 0.03 THOUSANDS/ÂΜL (ref 0–0.1)
BASOPHILS NFR BLD AUTO: 1 % (ref 0–1)
BILIRUB SERPL-MCNC: 0.39 MG/DL (ref 0.2–1)
BUN SERPL-MCNC: 26 MG/DL (ref 5–25)
CALCIUM ALBUM COR SERPL-MCNC: 8.7 MG/DL (ref 8.3–10.1)
CALCIUM SERPL-MCNC: 7.8 MG/DL (ref 8.4–10.2)
CHLORIDE SERPL-SCNC: 108 MMOL/L (ref 96–108)
CO2 SERPL-SCNC: 25 MMOL/L (ref 21–32)
CREAT SERPL-MCNC: 1.15 MG/DL (ref 0.6–1.3)
EOSINOPHIL # BLD AUTO: 0.09 THOUSAND/ÂΜL (ref 0–0.61)
EOSINOPHIL NFR BLD AUTO: 2 % (ref 0–6)
ERYTHROCYTE [DISTWIDTH] IN BLOOD BY AUTOMATED COUNT: 13.6 % (ref 11.6–15.1)
GFR SERPL CREATININE-BSD FRML MDRD: 70 ML/MIN/1.73SQ M
GLUCOSE SERPL-MCNC: 72 MG/DL (ref 65–140)
HCT VFR BLD AUTO: 35.5 % (ref 36.5–49.3)
HGB BLD-MCNC: 11.1 G/DL (ref 12–17)
IMM GRANULOCYTES # BLD AUTO: 0.02 THOUSAND/UL (ref 0–0.2)
IMM GRANULOCYTES NFR BLD AUTO: 0 % (ref 0–2)
LYMPHOCYTES # BLD AUTO: 1.08 THOUSANDS/ÂΜL (ref 0.6–4.47)
LYMPHOCYTES NFR BLD AUTO: 20 % (ref 14–44)
MCH RBC QN AUTO: 31.3 PG (ref 26.8–34.3)
MCHC RBC AUTO-ENTMCNC: 31.3 G/DL (ref 31.4–37.4)
MCV RBC AUTO: 100 FL (ref 82–98)
MONOCYTES # BLD AUTO: 0.78 THOUSAND/ÂΜL (ref 0.17–1.22)
MONOCYTES NFR BLD AUTO: 14 % (ref 4–12)
NEUTROPHILS # BLD AUTO: 3.47 THOUSANDS/ÂΜL (ref 1.85–7.62)
NEUTS SEG NFR BLD AUTO: 63 % (ref 43–75)
NRBC BLD AUTO-RTO: 0 /100 WBCS
PLATELET # BLD AUTO: 239 THOUSANDS/UL (ref 149–390)
PMV BLD AUTO: 10.2 FL (ref 8.9–12.7)
POTASSIUM SERPL-SCNC: 4.1 MMOL/L (ref 3.5–5.3)
PROT SERPL-MCNC: 5.4 G/DL (ref 6.4–8.4)
RBC # BLD AUTO: 3.55 MILLION/UL (ref 3.88–5.62)
SODIUM SERPL-SCNC: 140 MMOL/L (ref 135–147)
WBC # BLD AUTO: 5.47 THOUSAND/UL (ref 4.31–10.16)

## 2023-05-26 RX ORDER — TAMSULOSIN HYDROCHLORIDE 0.4 MG/1
0.8 CAPSULE ORAL
Status: DISCONTINUED | OUTPATIENT
Start: 2023-05-26 | End: 2023-05-30 | Stop reason: HOSPADM

## 2023-05-26 RX ORDER — ESCITALOPRAM OXALATE 20 MG/1
20 TABLET ORAL DAILY
Status: DISCONTINUED | OUTPATIENT
Start: 2023-05-27 | End: 2023-05-30 | Stop reason: HOSPADM

## 2023-05-26 RX ORDER — COLCHICINE 0.6 MG/1
0.6 TABLET ORAL DAILY
Status: DISCONTINUED | OUTPATIENT
Start: 2023-05-27 | End: 2023-05-30 | Stop reason: HOSPADM

## 2023-05-26 RX ORDER — ALLOPURINOL 100 MG/1
100 TABLET ORAL DAILY
Status: DISCONTINUED | OUTPATIENT
Start: 2023-05-27 | End: 2023-05-30 | Stop reason: HOSPADM

## 2023-05-26 RX ORDER — DIVALPROEX SODIUM 500 MG/1
500 TABLET, DELAYED RELEASE ORAL EVERY 12 HOURS SCHEDULED
Status: DISCONTINUED | OUTPATIENT
Start: 2023-05-27 | End: 2023-05-30 | Stop reason: HOSPADM

## 2023-05-26 RX ORDER — MONTELUKAST SODIUM 10 MG/1
10 TABLET ORAL DAILY
Status: DISCONTINUED | OUTPATIENT
Start: 2023-05-27 | End: 2023-05-30 | Stop reason: HOSPADM

## 2023-05-26 RX ORDER — DEXTROSE AND SODIUM CHLORIDE 5; .45 G/100ML; G/100ML
100 INJECTION, SOLUTION INTRAVENOUS CONTINUOUS
Status: DISCONTINUED | OUTPATIENT
Start: 2023-05-26 | End: 2023-05-27

## 2023-05-26 RX ORDER — LOSARTAN POTASSIUM 50 MG/1
100 TABLET ORAL DAILY
Status: DISCONTINUED | OUTPATIENT
Start: 2023-05-26 | End: 2023-05-30 | Stop reason: HOSPADM

## 2023-05-26 RX ADMIN — SODIUM CHLORIDE 125 ML/HR: 0.9 INJECTION, SOLUTION INTRAVENOUS at 02:16

## 2023-05-26 RX ADMIN — VALPROATE SODIUM 250 MG: 100 INJECTION, SOLUTION INTRAVENOUS at 12:59

## 2023-05-26 RX ADMIN — VALPROATE SODIUM 250 MG: 100 INJECTION, SOLUTION INTRAVENOUS at 06:00

## 2023-05-26 RX ADMIN — VALPROATE SODIUM 250 MG: 100 INJECTION, SOLUTION INTRAVENOUS at 17:44

## 2023-05-26 RX ADMIN — VALPROATE SODIUM 250 MG: 100 INJECTION, SOLUTION INTRAVENOUS at 00:01

## 2023-05-26 RX ADMIN — TAMSULOSIN HYDROCHLORIDE 0.8 MG: 0.4 CAPSULE ORAL at 16:51

## 2023-05-26 RX ADMIN — PANTOPRAZOLE SODIUM 40 MG: 40 INJECTION, POWDER, FOR SOLUTION INTRAVENOUS at 09:27

## 2023-05-26 RX ADMIN — RISPERIDONE 1.5 MG: 0.5 TABLET, ORALLY DISINTEGRATING ORAL at 17:44

## 2023-05-26 RX ADMIN — DEXTROSE AND SODIUM CHLORIDE 100 ML/HR: 5; .45 INJECTION, SOLUTION INTRAVENOUS at 11:21

## 2023-05-26 RX ADMIN — RISPERIDONE 1.5 MG: 0.5 TABLET, ORALLY DISINTEGRATING ORAL at 10:00

## 2023-05-26 RX ADMIN — LOSARTAN POTASSIUM 100 MG: 50 TABLET, FILM COATED ORAL at 16:51

## 2023-05-26 NOTE — ASSESSMENT & PLAN NOTE
Monitor off of antihypertensives while n p o    Can add as needed medication if becomes hypertensive

## 2023-05-26 NOTE — PLAN OF CARE
Problem: MOBILITY - ADULT  Goal: Maintain or return to baseline ADL function  Description: INTERVENTIONS:  -  Assess patient's ability to carry out ADLs; assess patient's baseline for ADL function and identify physical deficits which impact ability to perform ADLs (bathing, care of mouth/teeth, toileting, grooming, dressing, etc )  - Assess/evaluate cause of self-care deficits   - Assess range of motion  - Assess patient's mobility; develop plan if impaired  - Assess patient's need for assistive devices and provide as appropriate  - Encourage maximum independence but intervene and supervise when necessary  - Involve family in performance of ADLs  - Assess for home care needs following discharge   - Consider OT consult to assist with ADL evaluation and planning for discharge  - Provide patient education as appropriate  Outcome: Progressing  Goal: Maintains/Returns to pre admission functional level  Description: INTERVENTIONS:  - Perform BMAT or MOVE assessment daily    - Set and communicate daily mobility goal to care team and patient/family/caregiver  - Collaborate with rehabilitation services on mobility goals if consulted  - Perform Range of Motion 4 times a day  - Reposition patient every 2 hours    - Dangle patient 3 times a day  - Stand patient 3 times a day  - Ambulate patient 3 times a day  - Out of bed to chair 3 times a day   - Out of bed for meals 3 times a day  - Out of bed for toileting  - Record patient progress and toleration of activity level   Outcome: Progressing     Problem: Prexisting or High Potential for Compromised Skin Integrity  Goal: Skin integrity is maintained or improved  Description: INTERVENTIONS:  - Identify patients at risk for skin breakdown  - Assess and monitor skin integrity  - Assess and monitor nutrition and hydration status  - Monitor labs   - Assess for incontinence   - Turn and reposition patient  - Assist with mobility/ambulation  - Relieve pressure over bony prominences  - Avoid friction and shearing  - Provide appropriate hygiene as needed including keeping skin clean and dry  - Evaluate need for skin moisturizer/barrier cream  - Collaborate with interdisciplinary team   - Patient/family teaching  - Consider wound care consult   Outcome: Progressing     Problem: PAIN - ADULT  Goal: Verbalizes/displays adequate comfort level or baseline comfort level  Description: Interventions:  - Encourage patient to monitor pain and request assistance  - Assess pain using appropriate pain scale  - Administer analgesics based on type and severity of pain and evaluate response  - Implement non-pharmacological measures as appropriate and evaluate response  - Consider cultural and social influences on pain and pain management  - Notify physician/advanced practitioner if interventions unsuccessful or patient reports new pain  Outcome: Progressing     Problem: SAFETY ADULT  Goal: Maintain or return to baseline ADL function  Description: INTERVENTIONS:  -  Assess patient's ability to carry out ADLs; assess patient's baseline for ADL function and identify physical deficits which impact ability to perform ADLs (bathing, care of mouth/teeth, toileting, grooming, dressing, etc )  - Assess/evaluate cause of self-care deficits   - Assess range of motion  - Assess patient's mobility; develop plan if impaired  - Assess patient's need for assistive devices and provide as appropriate  - Encourage maximum independence but intervene and supervise when necessary  - Involve family in performance of ADLs  - Assess for home care needs following discharge   - Consider OT consult to assist with ADL evaluation and planning for discharge  - Provide patient education as appropriate  Outcome: Progressing  Goal: Maintains/Returns to pre admission functional level  Description: INTERVENTIONS:  - Perform BMAT or MOVE assessment daily    - Set and communicate daily mobility goal to care team and patient/family/caregiver  - Collaborate with rehabilitation services on mobility goals if consulted  - Perform Range of Motion 4 times a day  - Reposition patient every 2 hours    - Dangle patient 3 times a day  - Stand patient 3 times a day  - Ambulate patient 3 times a day  - Out of bed to chair 3 times a day   - Out of bed for meals 3 times a day  - Out of bed for toileting  - Record patient progress and toleration of activity level   Outcome: Progressing  Goal: Patient will remain free of falls  Description: INTERVENTIONS:  - Educate patient/family on patient safety including physical limitations  - Instruct patient to call for assistance with activity   - Consult OT/PT to assist with strengthening/mobility   - Keep Call bell within reach  - Keep bed low and locked with side rails adjusted as appropriate  - Keep care items and personal belongings within reach  - Initiate and maintain comfort rounds  - Make Fall Risk Sign visible to staff  - Offer Toileting every 2 Hours, in advance of need  - Initiate/Maintain bed alarm  - Obtain necessary fall risk management equipment:   - Apply yellow socks and bracelet for high fall risk patients  - Consider moving patient to room near nurses station  Outcome: Progressing     Problem: DISCHARGE PLANNING  Goal: Discharge to home or other facility with appropriate resources  Description: INTERVENTIONS:  - Identify barriers to discharge w/patient and caregiver  - Arrange for needed discharge resources and transportation as appropriate  - Identify discharge learning needs (meds, wound care, etc )  - Arrange for interpretive services to assist at discharge as needed  - Refer to Case Management Department for coordinating discharge planning if the patient needs post-hospital services based on physician/advanced practitioner order or complex needs related to functional status, cognitive ability, or social support system  Outcome: Progressing     Problem: Knowledge Deficit  Goal: Patient/family/caregiver demonstrates understanding of disease process, treatment plan, medications, and discharge instructions  Description: Complete learning assessment and assess knowledge base  Interventions:  - Provide teaching at level of understanding  - Provide teaching via preferred learning methods  Outcome: Progressing     Problem: RESPIRATORY - ADULT  Goal: Achieves optimal ventilation and oxygenation  Description: INTERVENTIONS:  - Assess for changes in respiratory status  - Assess for changes in mentation and behavior  - Position to facilitate oxygenation and minimize respiratory effort  - Oxygen administered by appropriate delivery if ordered  - Initiate smoking cessation education as indicated  - Encourage broncho-pulmonary hygiene including cough, deep breathe, Incentive Spirometry  - Assess the need for suctioning and aspirate as needed  - Assess and instruct to report SOB or any respiratory difficulty  - Respiratory Therapy support as indicated  Outcome: Progressing     Problem: Nutrition/Hydration-ADULT  Goal: Nutrient/Hydration intake appropriate for improving, restoring or maintaining nutritional needs  Description: Monitor and assess patient's nutrition/hydration status for malnutrition  Collaborate with interdisciplinary team and initiate plan and interventions as ordered  Monitor patient's weight and dietary intake as ordered or per policy  Utilize nutrition screening tool and intervene as necessary  Determine patient's food preferences and provide high-protein, high-caloric foods as appropriate       INTERVENTIONS:  - Monitor oral intake, urinary output, labs, and treatment plans  - Assess nutrition and hydration status and recommend course of action  - Evaluate amount of meals eaten  - Assist patient with eating if necessary   - Allow adequate time for meals  - Recommend/ encourage appropriate diets, oral nutritional supplements, and vitamin/mineral supplements  - Order, calculate, and assess calorie counts as needed  - Recommend, monitor, and adjust tube feedings and TPN/PPN based on assessed needs  - Assess need for intravenous fluids  - Provide specific nutrition/hydration education as appropriate  - Include patient/family/caregiver in decisions related to nutrition  Outcome: Progressing

## 2023-05-26 NOTE — ASSESSMENT & PLAN NOTE
· Caregiver reports patient eats very rapidly and often coughs/chokes during eating  · Rhonchorous breath sounds in the emergency department  · Received dose of antibiotics in the emergency department  · Leukocytosis resolved  procalcitonin WNL  Afebrile      Plan:  · Supplemental O2 as needed for O2 saturation greater than 94%  · Hold further antibiotics for now  · DuoNebs every 6 hours

## 2023-05-26 NOTE — PLAN OF CARE
Problem: MOBILITY - ADULT  Goal: Maintain or return to baseline ADL function  Description: INTERVENTIONS:  -  Assess patient's ability to carry out ADLs; assess patient's baseline for ADL function and identify physical deficits which impact ability to perform ADLs (bathing, care of mouth/teeth, toileting, grooming, dressing, etc )  - Assess/evaluate cause of self-care deficits   - Assess range of motion  - Assess patient's mobility; develop plan if impaired  - Assess patient's need for assistive devices and provide as appropriate  - Encourage maximum independence but intervene and supervise when necessary  - Involve family in performance of ADLs  - Assess for home care needs following discharge   - Consider OT consult to assist with ADL evaluation and planning for discharge  - Provide patient education as appropriate  Outcome: Progressing  Goal: Maintains/Returns to pre admission functional level  Description: INTERVENTIONS:  - Perform BMAT or MOVE assessment daily    - Set and communicate daily mobility goal to care team and patient/family/caregiver     - Collaborate with rehabilitation services on mobility goals if consulted  - Out of bed for toileting  - Record patient progress and toleration of activity level   Outcome: Progressing     Problem: Prexisting or High Potential for Compromised Skin Integrity  Goal: Skin integrity is maintained or improved  Description: INTERVENTIONS:  - Identify patients at risk for skin breakdown  - Assess and monitor skin integrity  - Assess and monitor nutrition and hydration status  - Monitor labs   - Assess for incontinence   - Turn and reposition patient  - Assist with mobility/ambulation  - Relieve pressure over bony prominences  - Avoid friction and shearing  - Provide appropriate hygiene as needed including keeping skin clean and dry  - Evaluate need for skin moisturizer/barrier cream  - Collaborate with interdisciplinary team   - Patient/family teaching  - Consider wound care consult   Outcome: Progressing     Problem: PAIN - ADULT  Goal: Verbalizes/displays adequate comfort level or baseline comfort level  Description: Interventions:  - Encourage patient to monitor pain and request assistance  - Assess pain using appropriate pain scale  - Administer analgesics based on type and severity of pain and evaluate response  - Implement non-pharmacological measures as appropriate and evaluate response  - Consider cultural and social influences on pain and pain management  - Notify physician/advanced practitioner if interventions unsuccessful or patient reports new pain  Outcome: Progressing     Problem: SAFETY ADULT  Goal: Maintain or return to baseline ADL function  Description: INTERVENTIONS:  -  Assess patient's ability to carry out ADLs; assess patient's baseline for ADL function and identify physical deficits which impact ability to perform ADLs (bathing, care of mouth/teeth, toileting, grooming, dressing, etc )  - Assess/evaluate cause of self-care deficits   - Assess range of motion  - Assess patient's mobility; develop plan if impaired  - Assess patient's need for assistive devices and provide as appropriate  - Encourage maximum independence but intervene and supervise when necessary  - Involve family in performance of ADLs  - Assess for home care needs following discharge   - Consider OT consult to assist with ADL evaluation and planning for discharge  - Provide patient education as appropriate  Outcome: Progressing  Goal: Maintains/Returns to pre admission functional level  Description: INTERVENTIONS:  - Perform BMAT or MOVE assessment daily    - Set and communicate daily mobility goal to care team and patient/family/caregiver     - Collaborate with rehabilitation services on mobility goals if consulted  - Out of bed for toileting  - Record patient progress and toleration of activity level   Outcome: Progressing  Goal: Patient will remain free of falls  Description: INTERVENTIONS:  - Educate patient/family on patient safety including physical limitations  - Instruct patient to call for assistance with activity   - Consult OT/PT to assist with strengthening/mobility   - Keep Call bell within reach  - Keep bed low and locked with side rails adjusted as appropriate  - Keep care items and personal belongings within reach  - Initiate and maintain comfort rounds  - Make Fall Risk Sign visible to staff  - Initiate/Maintain bed alarm  - Apply yellow socks and bracelet for high fall risk patients  - Consider moving patient to room near nurses station  Outcome: Progressing     Problem: DISCHARGE PLANNING  Goal: Discharge to home or other facility with appropriate resources  Description: INTERVENTIONS:  - Identify barriers to discharge w/patient and caregiver  - Arrange for needed discharge resources and transportation as appropriate  - Identify discharge learning needs (meds, wound care, etc )  - Arrange for interpretive services to assist at discharge as needed  - Refer to Case Management Department for coordinating discharge planning if the patient needs post-hospital services based on physician/advanced practitioner order or complex needs related to functional status, cognitive ability, or social support system  Outcome: Progressing     Problem: Knowledge Deficit  Goal: Patient/family/caregiver demonstrates understanding of disease process, treatment plan, medications, and discharge instructions  Description: Complete learning assessment and assess knowledge base    Interventions:  - Provide teaching at level of understanding  - Provide teaching via preferred learning methods  Outcome: Progressing     Problem: RESPIRATORY - ADULT  Goal: Achieves optimal ventilation and oxygenation  Description: INTERVENTIONS:  - Assess for changes in respiratory status  - Assess for changes in mentation and behavior  - Position to facilitate oxygenation and minimize respiratory effort  - Oxygen administered by appropriate delivery if ordered  - Initiate smoking cessation education as indicated  - Encourage broncho-pulmonary hygiene including cough, deep breathe, Incentive Spirometry  - Assess the need for suctioning and aspirate as needed  - Assess and instruct to report SOB or any respiratory difficulty  - Respiratory Therapy support as indicated  Outcome: Progressing     Problem: Nutrition/Hydration-ADULT  Goal: Nutrient/Hydration intake appropriate for improving, restoring or maintaining nutritional needs  Description: Monitor and assess patient's nutrition/hydration status for malnutrition  Collaborate with interdisciplinary team and initiate plan and interventions as ordered  Monitor patient's weight and dietary intake as ordered or per policy  Utilize nutrition screening tool and intervene as necessary  Determine patient's food preferences and provide high-protein, high-caloric foods as appropriate       INTERVENTIONS:  - Monitor oral intake, urinary output, labs, and treatment plans  - Assess nutrition and hydration status and recommend course of action  - Evaluate amount of meals eaten  - Assist patient with eating if necessary   - Allow adequate time for meals  - Recommend/ encourage appropriate diets, oral nutritional supplements, and vitamin/mineral supplements  - Order, calculate, and assess calorie counts as needed  - Recommend, monitor, and adjust tube feedings and TPN/PPN based on assessed needs  - Assess need for intravenous fluids  - Provide specific nutrition/hydration education as appropriate  - Include patient/family/caregiver in decisions related to nutrition  Outcome: Progressing

## 2023-05-26 NOTE — ASSESSMENT & PLAN NOTE
· Hemoglobin dropped from 14 2 on admission to 10 8 this morning  · No definite bleeding source  No hematochezia/melena, hematuria, hemoptysis, hematemesis, hemolysis  · Possible delayed drop? NGT output was initially dark presentation, clear the following morning  · Vital signs stable      Plan  · Hemoglobin stable, continue to monitor with daily CBC  · Monitor for any signs of bleeding  · Hold DVT prophylaxis, ambulate patient  · Monitor vitals closely

## 2023-05-26 NOTE — CASE MANAGEMENT
Case Management Assessment & Discharge Planning Note    Patient name Willena Brittle  Location W /W -01 MRN [id number]  : 1965 Date 2023       Current Admission Date: 2023  Current Admission Diagnosis:Small bowel obstruction, partial [facility])   Patient Active Problem List    Diagnosis Date Noted   • JENNA (acute kidney injury) ([facility] [address] ) 2023   • Acute anemia 2023   • Small bowel obstruction, partial ([facility] [address] ) 2023   • Esophagitis 2023   • Pancreatic abnormality 2023   • Aspiration pneumonitis ([facility] [address] ) 2023   • LUPIS (obstructive sleep apnea) 2023   • Mental impairment 2023   • Chronic diarrhea 2023   • Gouty arthritis of right great toe 2023   • Mild persistent asthma without complication    • Morbid obesity ([facility] [address] ) 2023   • Bipolar disorder ([facility] [address] ) 2023   • BPH (benign prostatic hyperplasia) 2023   • Cardiomegaly 2023   • Essential hypertension 2023   • Normocytic anemia 2023   • Recurrent pleural effusion on right 2023      LOS (days): 2  Geometric Mean LOS (GMLOS) (days): 4 30  Days to GMLOS:2 3     OBJECTIVE:    Risk of Unplanned Readmission Score: 12 04         Current admission status: Inpatient       Preferred Pharmacy: No Pharmacies Listed  Primary Care Provider: Paramjit Perry DO    Primary Insurance: MEDICARE  Secondary Insurance[place]    ASSESSMENT:  [place] [facility] Proxies    There are no active Health Care Proxies on file  Readmission Root Cause  30 Day Readmission: No    Patient Information  Admitted from[de-identified] Facility  Mental Status: Other (Comment) (N/A)  During Assessment patient was accompanied by:  Other-Comment (N/A)  Assessment information provided by[de-identified] Other - please comment (N/A)  Primary Caregiver: Self  Support Systems: /, [facility] Residence: [address] [place] [address] do you live in?: [address] [place] [address] options   Select all that apply : No steps to enter home  Type of Current Residence: Facility  Upon entering residence, is there a bedroom on the main floor (no further steps)?: Yes  Upon entering residence, is there a bathroom on the main floor (no further steps)?: Yes  In the last 12 months, was there a time when you were not able to pay the mortgage or rent on time?: No  In the last 12 months, how many places have you lived?: 1  In the last 12 months, was there a time when you did not have a steady place to sleep or slept in a shelter (including now)?: No  Homeless/housing insecurity resource given?: N/A  Living Arrangements: Other (Comment) (lives at facility)    Activities of Daily Living Prior to Admission  Functional Status: Independent  Completes ADLs independently?: Yes  Ambulates independently?: Yes (per CM patient ambulates slowly)  Does patient use assisted devices?: No  Does patient currently own DME?: No  Does patient have a history of Outpatient Therapy (PT/OT)?: No  Does the patient have a history of Short-Term Rehab?: No  Does patient have a history of HHC?: No  Does patient currently have [facility]?: No    Patient Information Continued  Income Source: SSI/SSD  Does patient have prescription coverage?: Yes  Within the past 12 months, you worried that your food would run out before you got the money to buy more : Never true  Within the past 12 months, the food you bought just didn't last and you didn't have money to get more : Never true  Food insecurity resource given?: N/A  Does patient receive dialysis treatments?: No  Does patient have a history of substance abuse?: No    Means of Transportation  Means of Transport to Appts[de-identified] Other (Comment) (facility)  In the past 12 months, has lack of transportation kept you from medical appointments or from getting medications?: No  In the past 12 months, has lack of transportation kept you from meetings, work, or from getting things needed for daily living?: No  Was application for public transport provided?: N/A        DISCHARGE DETAILS:    Discharge planning discussed with[de-identified] Megan Flores- facility , over phone  Freedom of Choice: Yes  Comments - Freedom of Choice: return to [place] [address]  CM contacted family/caregiver?: Yes  Were Treatment Team discharge recommendations reviewed with patient/caregiver?: Yes  Did patient/caregiver verbalize understanding of patient care needs?: N/A- going to facility  Were patient/caregiver advised of the risks associated with not following Treatment Team discharge recommendations?: Yes    Contacts  Patient Contacts: Megan Flores ()  Relationship to Patient[de-identified] Other (Comment)  Contact Method: Phone  Phone Number: [phone]  Reason/Outcome: Continuity of Care, Emergency Contact, Discharge [address]         [facility] No    DME Referral Provided  Referral made for DME?: No    Other Referral/Resources/Interventions Provided:  Interventions: [address] Return  Referral Comments: CM spoke with patients  Aracelis Shieldsn from [address], to complete assessment and discuss dcp  Per Aracelis Fuentes patient is independent at baseline with ADLs and ambulation  No HHC or DME  Plan is for patient to return to [facility] once medically stable (per [facility] will likely not be until Monday)  Per facility they cannot accept patient back on a weekend or Monday due to holiday  Patient will return to facility on Tuesday and per facility they require a meeting with them, CM and provider 24hrs prior to d/c  CM sent teams link to KRISTINA resident, attending, covering CM and Kathy at [email or web address] for 5/29 from 1:30 PM- 2:00 PM  Facility will provide transportation on Tuesday  CM to follow up as able to continue with dcp      Would you like to participate in our [address] service program?  : No - Declined    Treatment Team Recommendation: Group Home, Facility Return  Discharge Destination Plan[de-identified] Facility Return, Group Home  Transport at Discharge :  Other (Comment) ([facility])

## 2023-05-26 NOTE — ASSESSMENT & PLAN NOTE
· Creatinine jumped from 1 2 on admission to 2 today  · Back down to 1 15 today  · Resolved    Plan:  · Continue  mL/h while patient n p o   · Trend Cr

## 2023-05-26 NOTE — ASSESSMENT & PLAN NOTE
· Per discussion with patient's RN  Patient had an ED visit at [facility] in August 2022 for a rule out ACS  CT chest obtained at that time had no lesions or abnormalities  · Has subsequently developed right-sided pleural effusions  He has had 3 thoracenteses all draining greater than 1500 cc with negative cytologies  RN unsure whether transudative or exudative  · Patient does follow with pulmonology at [facility]  · S/p IR thoracentesis 5/25 with 1800 mL of clear yellow output  · Exudative effusion, per Lights criteria  · 1559 WBCs, lymphocyte predominant 82%  pH 7 7  Glucose 91  No bacteria on Gram stain    Culture pending    Plan:  Monitor respiratory status  Patient should follow-up with pulmonology as an outpatient

## 2023-05-26 NOTE — ASSESSMENT & PLAN NOTE
· CTAP in ED concerning for small bowel obstruction  · Patient had bowel movements day of admission and is still passing gas  · Evaluated by surgery, felt to be due to gastroenteritis vs full bowel obstruction  · No prior abdominal surgeries    · Repeat CTAP with oral contrast 5/25 without evidence of small bowel obstruction, as contrast was noted in the colon  · NG tube discontinued  · Attempted initiation of clear liquid diet after speech evaluation    However, patient failed initial evaluation    Plan:  · Speech consult, appreciate recommendations  · Continue n p o  status until reevaluation  · Initiation of clear liquid diet once cleared by speech  · Serial abdominal exam

## 2023-05-26 NOTE — PROGRESS NOTES
[facility]  Progress Note  Name: Aletha Arango  MRN: [id number]  Unit/Bed#: W -01 I Date of Admission: 5/24/2023   Date of Service: 5/26/2023 I Hospital Day: 2    Assessment/Plan   * Small bowel obstruction, partial ([address] )  Assessment & Plan  · CTAP in ED concerning for small bowel obstruction  · Patient had bowel movements day of admission and is still passing gas  · Evaluated by surgery, felt to be due to gastroenteritis vs full bowel obstruction  · No prior abdominal surgeries    · Repeat CTAP with oral contrast 5/25 without evidence of small bowel obstruction, as contrast was noted in the colon  · NG tube discontinued  · Attempted initiation of clear liquid diet after speech evaluation  However, patient failed initial evaluation    Plan:  · Speech consult, appreciate recommendations  · Continue n p o  status until reevaluation  · Initiation of clear liquid diet once cleared by speech  · Serial abdominal exam    Recurrent pleural effusion on right  Assessment & Plan  · Per discussion with patient's RN  Patient had an ED visit at [facility] in August 2022 for a rule out ACS  CT chest obtained at that time had no lesions or abnormalities  · Has subsequently developed right-sided pleural effusions  He has had 3 thoracenteses all draining greater than 1500 cc with negative cytologies  RN unsure whether transudative or exudative  · Patient does follow with pulmonology at [facility]  · S/p IR thoracentesis 5/25 with 1800 mL of clear yellow output  · Exudative effusion, per Lights criteria  · 1559 WBCs, lymphocyte predominant 82%  pH 7 7  Glucose 91  No bacteria on Gram stain    Culture pending    Plan:  Monitor respiratory status  Patient should follow-up with pulmonology as an outpatient    Aspiration pneumonitis [facility])  Assessment & Plan  · Caregiver reports patient eats very rapidly and often coughs/chokes during eating  · Rhonchorous breath sounds in the emergency department  · Received dose of antibiotics in the emergency department  · Leukocytosis resolved  procalcitonin WNL  Afebrile  Plan:  · Supplemental O2 as needed for O2 saturation greater than 94%  · Hold further antibiotics for now  · DuoNebs every 6 hours    Acute anemia  Assessment & Plan  · Hemoglobin dropped from 14 2 on admission to 10 8 this morning  · No definite bleeding source  No hematochezia/melena, hematuria, hemoptysis, hematemesis, hemolysis  · Possible delayed drop? NGT output was initially dark presentation, clear the following morning  · Vital signs stable  Plan  · Hemoglobin stable, continue to monitor with daily CBC  · Monitor for any signs of bleeding  · Hold DVT prophylaxis, ambulate patient  · Monitor vitals closely    JENNA (acute kidney injury) ([facility] [address] )  Assessment & Plan  · Creatinine jumped from 1 2 on admission to 2 today  · Back down to 1 15 today  · Resolved    Plan:  · Continue  mL/h while patient n p o   · Trend Cr    Esophagitis  Assessment & Plan  Evidence of esophagitis on CT  No noted history    Plan:  PPI  Patient does have appointment with outpatient GI for issue with chronic diarrhea, can follow-up bout this esophagitis at that time    Pancreatic abnormality  Assessment & Plan  · Incidentally noted pancreatic tail nodule  · Non-urgent MRI recommended    LUPIS (obstructive sleep apnea)  Assessment & Plan  · Newly diagnosed via sleep study  · Not currently using CPAP as outpatient  · Patient agreeable to trying CPAP tonight, will order    Mild persistent asthma without complication  Assessment & Plan  · Hold Singulair in the setting of n p o  status  · Continue DuoNebs    Essential hypertension  Assessment & Plan  Monitor off of antihypertensives while n p o    Can add as needed medication if becomes hypertensive    Bipolar disorder [facility])  Assessment & Plan  · Patient is n p o , will switch risperidone twice daily to oral disintegrating tablets  · We will switch Depakote to IV (250 mg every 6 hours, per pharmacy)           VTE Pharmacologic Prophylaxis: VTE Score: 5 High Risk (Score >/= 5) - Pharmacological DVT Prophylaxis Contraindicated  Sequential Compression Devices Ordered  Also discussed with RN that patient should be ambulating    Patient Centered Rounds: I performed bedside rounds with nursing staff today  Discussions with Specialists or Other Care Team Provider: none    Education and Discussions with Family / Patient: Updated  () via phone  Current Length of Stay: 2 day(s)  Current Patient Status: Inpatient   Discharge Plan: Anticipate discharge in >72 hrs to prior assisted or independent living facility  Code Status: Level 1 - Full Code    Subjective:   No acute events overnight  Patient denies physical complaints today  No shortness of breath, chest pain, abdominal pain  Has still not had bowel movement but did state that he was passing gas this morning  Continues to ask to eat  Objective:     Vitals:   Temp (24hrs), Av 1 °F (36 7 °C), Min:97 8 °F (36 6 °C), Max:98 9 °F (37 2 °C)    Temp:  [97 8 °F (36 6 °C)-98 9 °F (37 2 °C)] 97 8 °F (36 6 °C)  HR:  [61-79] 65  Resp:  [17-18] 18  BP: (115-148)/(60-78) 148/78  SpO2:  [91 %-97 %] 97 %  Body mass index is 42 52 kg/m²  Input and Output Summary (last 24 hours): Intake/Output Summary (Last 24 hours) at 2023 0828  Last data filed at 2023 0234  Gross per 24 hour   Intake 1675 ml   Output 550 ml   Net 1125 ml       Physical Exam:   Physical Exam  Vitals and nursing note reviewed  Constitutional:       General: He is not in acute distress  Appearance: Normal appearance  He is well-developed  He is not ill-appearing or toxic-appearing  HENT:      Head: Normocephalic and atraumatic  Eyes:      Conjunctiva/sclera: Conjunctivae normal    Cardiovascular:      Rate and Rhythm: Normal rate and regular rhythm        Heart sounds: No murmur heard   Pulmonary:      Effort: Pulmonary effort is normal  No respiratory distress  Breath sounds: Rhonchi present  Abdominal:      General: Abdomen is flat  Bowel sounds are normal  There is no distension  Palpations: Abdomen is soft  Tenderness: There is no abdominal tenderness  There is no guarding  Musculoskeletal:         General: No swelling  Cervical back: Neck supple  Right lower leg: No edema  Left lower leg: No edema  Skin:     General: Skin is warm and dry  Capillary Refill: Capillary refill takes less than 2 seconds  Neurological:      General: No focal deficit present  Mental Status: He is alert and oriented to person, place, and time     Psychiatric:         Mood and Affect: Mood normal          Behavior: Behavior normal          Additional Data:     Labs:  Results from last 7 days   Lab Units 05/26/23  0445   EOS PCT % 2   HEMATOCRIT % 35 5*   HEMOGLOBIN g/dL 11 1*   LYMPHS PCT % 20   MONOS PCT % 14*   NEUTROS PCT % 63   PLATELETS Thousands/uL 239   WBC Thousand/uL 5 47     Results from last 7 days   Lab Units 05/26/23  0445   ANION GAP mmol/L 7   ALBUMIN g/dL 2 9*   ALK PHOS U/L 36   ALT U/L 4*   AST U/L 8*   BUN mg/dL 26*   CALCIUM mg/dL 7 8*   CHLORIDE mmol/L 108   CO2 mmol/L 25   CREATININE mg/dL 1 15   GLUCOSE RANDOM mg/dL 72   POTASSIUM mmol/L 4 1   SODIUM mmol/L 140   TOTAL BILIRUBIN mg/dL 0 39     Results from last 7 days   Lab Units 05/24/23  1238   INR  1 12             Results from last 7 days   Lab Units 05/25/23  0443 05/24/23  1238   LACTIC ACID mmol/L  --  1 4   PROCALCITONIN ng/ml 0 18 0 06       Lines/Drains:  Invasive Devices     Peripheral Intravenous Line  Duration           Peripheral IV 05/24/23 Distal;Right;Upper;Ventral (anterior) Arm 1 day                      Imaging: Reviewed radiology reports from this admission including: AINSLEY cuevas    Recent Cultures (last 7 days):   Results from last 7 days   Lab Units 05/25/23  1323 05/24/23  1238   BLOOD CULTURE   --  No Growth at 24 hrs  No Growth at 24 hrs  GRAM STAIN RESULT  2+ Polys  No bacteria seen  --        Last 24 Hours Medication List:   Current Facility-Administered Medications   Medication Dose Route Frequency Provider Last Rate   • metoprolol  2 5 mg Intravenous Q6H PRN Grady Lewis MD     • pantoprazole  40 mg Intravenous Q24H [address] Kelsy Steinberg MD     • risperiDONE  1 5 mg Oral BID Grady Lewis MD     • sodium chloride  125 mL/hr Intravenous Continuous Rachana Bales  mL/hr (05/26/23 0216)   • valproate sodium  250 mg Intravenous Q6H [address] Grady Lewis  mg (05/26/23 0600)        Today, Patient Was Seen By: Rod Hashimoto, MD    **Please Note: This note may have been constructed using a voice recognition system  **

## 2023-05-26 NOTE — ASSESSMENT & PLAN NOTE
Evidence of esophagitis on CT  No noted history    Plan:  PPI  Patient does have appointment with outpatient GI for issue with chronic diarrhea, can follow-up bout this esophagitis at that time

## 2023-05-26 NOTE — ASSESSMENT & PLAN NOTE
· Newly diagnosed via sleep study  · Not currently using CPAP as outpatient  · Patient agreeable to trying CPAP tonight, will order

## 2023-05-26 NOTE — SPEECH THERAPY NOTE
"Speech Language/Pathology    Patient Name: Jared Anderson    Today's Date: 5/26/2023     Problem List  Principal Problem:    Small bowel obstruction, partial ([facility] [address] )  Active Problems:    Bipolar disorder ([facility] [address] )    Essential hypertension    Mild persistent asthma without complication    LUPIS (obstructive sleep apnea)    Recurrent pleural effusion on right    Esophagitis    Pancreatic abnormality    Aspiration pneumonitis (HCC)    JENNA (acute kidney injury) ([facility] [address] )    Acute anemia         Past Medical History  History reviewed  No pertinent past medical history  Past Surgical History  Past Surgical History:   Procedure Laterality Date   • IR THORACENTESIS  5/25/2023         Subjective:  Pt awake and OOB in chair as SLP entered the room  Pt responded to clinician'as greeting with \"hello\"  No complaints given  Objective:  Pt seen for dysphagia re-assessment  Pt consumed trials of regular (toast), soft (cookie), puree (applesauce), thin liquids, and NTL  Pt able to adequately bite through solids; no anterior leakage given solids solids or liquids  A-P transfer and swallow initiation appeared timely given all consistencies  No overt s/s of aspiration noted with all tested consistencies except thin liquids  Pt continues with intermittent wet cough, wet vocal quality s/p swallow of thin  Assessment:  No overt s/s of aspiration noted with all tested consistencies except thin liquids  Per RN, lungs continue to be congested         Plan/Recommendations:  Regular Solids w/ Nectar Thick Liquids  VBS to further assess swallow function- in particular thin liquids  ST to continue to follow     "

## 2023-05-27 LAB
ANION GAP SERPL CALCULATED.3IONS-SCNC: 4 MMOL/L (ref 4–13)
BASOPHILS # BLD AUTO: 0.02 THOUSANDS/ÂΜL (ref 0–0.1)
BASOPHILS NFR BLD AUTO: 0 % (ref 0–1)
BUN SERPL-MCNC: 19 MG/DL (ref 5–25)
CALCIUM SERPL-MCNC: 7.7 MG/DL (ref 8.4–10.2)
CHLORIDE SERPL-SCNC: 108 MMOL/L (ref 96–108)
CO2 SERPL-SCNC: 26 MMOL/L (ref 21–32)
CREAT SERPL-MCNC: 0.94 MG/DL (ref 0.6–1.3)
EOSINOPHIL # BLD AUTO: 0.18 THOUSAND/ÂΜL (ref 0–0.61)
EOSINOPHIL NFR BLD AUTO: 3 % (ref 0–6)
ERYTHROCYTE [DISTWIDTH] IN BLOOD BY AUTOMATED COUNT: 13.5 % (ref 11.6–15.1)
GFR SERPL CREATININE-BSD FRML MDRD: 90 ML/MIN/1.73SQ M
GLUCOSE SERPL-MCNC: 110 MG/DL (ref 65–140)
HCT VFR BLD AUTO: 32.7 % (ref 36.5–49.3)
HGB BLD-MCNC: 10.5 G/DL (ref 12–17)
IMM GRANULOCYTES # BLD AUTO: 0.02 THOUSAND/UL (ref 0–0.2)
IMM GRANULOCYTES NFR BLD AUTO: 0 % (ref 0–2)
LYMPHOCYTES # BLD AUTO: 1.3 THOUSANDS/ÂΜL (ref 0.6–4.47)
LYMPHOCYTES NFR BLD AUTO: 24 % (ref 14–44)
MCH RBC QN AUTO: 31.4 PG (ref 26.8–34.3)
MCHC RBC AUTO-ENTMCNC: 32.1 G/DL (ref 31.4–37.4)
MCV RBC AUTO: 98 FL (ref 82–98)
MONOCYTES # BLD AUTO: 0.91 THOUSAND/ÂΜL (ref 0.17–1.22)
MONOCYTES NFR BLD AUTO: 17 % (ref 4–12)
NEUTROPHILS # BLD AUTO: 2.97 THOUSANDS/ÂΜL (ref 1.85–7.62)
NEUTS SEG NFR BLD AUTO: 56 % (ref 43–75)
NRBC BLD AUTO-RTO: 0 /100 WBCS
PLATELET # BLD AUTO: 214 THOUSANDS/UL (ref 149–390)
PMV BLD AUTO: 9.9 FL (ref 8.9–12.7)
POTASSIUM SERPL-SCNC: 4 MMOL/L (ref 3.5–5.3)
RBC # BLD AUTO: 3.34 MILLION/UL (ref 3.88–5.62)
SODIUM SERPL-SCNC: 138 MMOL/L (ref 135–147)
WBC # BLD AUTO: 5.4 THOUSAND/UL (ref 4.31–10.16)

## 2023-05-27 RX ADMIN — TAMSULOSIN HYDROCHLORIDE 0.8 MG: 0.4 CAPSULE ORAL at 16:50

## 2023-05-27 RX ADMIN — RISPERIDONE 1.5 MG: 0.5 TABLET, ORALLY DISINTEGRATING ORAL at 17:29

## 2023-05-27 RX ADMIN — DEXTROSE AND SODIUM CHLORIDE 100 ML/HR: 5; .45 INJECTION, SOLUTION INTRAVENOUS at 00:01

## 2023-05-27 RX ADMIN — COLCHICINE 0.6 MG: 0.6 TABLET ORAL at 08:28

## 2023-05-27 RX ADMIN — ALLOPURINOL 100 MG: 100 TABLET ORAL at 08:27

## 2023-05-27 RX ADMIN — ESCITALOPRAM OXALATE 20 MG: 20 TABLET ORAL at 08:28

## 2023-05-27 RX ADMIN — LOSARTAN POTASSIUM 100 MG: 50 TABLET, FILM COATED ORAL at 08:28

## 2023-05-27 RX ADMIN — DIVALPROEX SODIUM 500 MG: 500 TABLET, DELAYED RELEASE ORAL at 08:28

## 2023-05-27 RX ADMIN — VALPROATE SODIUM 250 MG: 100 INJECTION, SOLUTION INTRAVENOUS at 00:01

## 2023-05-27 RX ADMIN — RISPERIDONE 1.5 MG: 0.5 TABLET, ORALLY DISINTEGRATING ORAL at 08:29

## 2023-05-27 RX ADMIN — DIVALPROEX SODIUM 500 MG: 500 TABLET, DELAYED RELEASE ORAL at 21:03

## 2023-05-27 RX ADMIN — PANTOPRAZOLE SODIUM 40 MG: 40 INJECTION, POWDER, FOR SOLUTION INTRAVENOUS at 08:28

## 2023-05-27 RX ADMIN — MONTELUKAST 10 MG: 10 TABLET, FILM COATED ORAL at 08:28

## 2023-05-27 NOTE — PLAN OF CARE
Problem: MOBILITY - ADULT  Goal: Maintain or return to baseline ADL function  Description: INTERVENTIONS:  -  Assess patient's ability to carry out ADLs; assess patient's baseline for ADL function and identify physical deficits which impact ability to perform ADLs (bathing, care of mouth/teeth, toileting, grooming, dressing, etc )  - Assess/evaluate cause of self-care deficits   - Assess range of motion  - Assess patient's mobility; develop plan if impaired  - Assess patient's need for assistive devices and provide as appropriate  - Encourage maximum independence but intervene and supervise when necessary  - Involve family in performance of ADLs  - Assess for home care needs following discharge   - Consider OT consult to assist with ADL evaluation and planning for discharge  - Provide patient education as appropriate  Outcome: Progressing  Goal: Maintains/Returns to pre admission functional level  Description: INTERVENTIONS:  - Perform BMAT or MOVE assessment daily    - Set and communicate daily mobility goal to care team and patient/family/caregiver  - Collaborate with rehabilitation services on mobility goals if consulted  - Perform Range of Motion 3 times a day  - Reposition patient every 2 hours  - Dangle patient 3 times a day  - Stand patient 3 times a day  - Ambulate patient 3 times a day  - Out of bed to chair 3 times a day   - Out of bed for meals 3 times a day  - Out of bed for toileting  - Record patient progress and toleration of activity level   Outcome: Progressing     Problem: MOBILITY - ADULT  Goal: Maintains/Returns to pre admission functional level  Description: INTERVENTIONS:  - Perform BMAT or MOVE assessment daily    - Set and communicate daily mobility goal to care team and patient/family/caregiver  - Collaborate with rehabilitation services on mobility goals if consulted  - Perform Range of Motion 3 times a day  - Reposition patient every 2 hours    - Dangle patient 3 times a day  - Stand patient 3 times a day  - Ambulate patient 3 times a day  - Out of bed to chair 3 times a day   - Out of bed for meals 3 times a day  - Out of bed for toileting  - Record patient progress and toleration of activity level   Outcome: Progressing     Problem: Prexisting or High Potential for Compromised Skin Integrity  Goal: Skin integrity is maintained or improved  Description: INTERVENTIONS:  - Identify patients at risk for skin breakdown  - Assess and monitor skin integrity  - Assess and monitor nutrition and hydration status  - Monitor labs   - Assess for incontinence   - Turn and reposition patient  - Assist with mobility/ambulation  - Relieve pressure over bony prominences  - Avoid friction and shearing  - Provide appropriate hygiene as needed including keeping skin clean and dry  - Evaluate need for skin moisturizer/barrier cream  - Collaborate with interdisciplinary team   - Patient/family teaching  - Consider wound care consult   Outcome: Progressing     Problem: PAIN - ADULT  Goal: Verbalizes/displays adequate comfort level or baseline comfort level  Description: Interventions:  - Encourage patient to monitor pain and request assistance  - Assess pain using appropriate pain scale  - Administer analgesics based on type and severity of pain and evaluate response  - Implement non-pharmacological measures as appropriate and evaluate response  - Consider cultural and social influences on pain and pain management  - Notify physician/advanced practitioner if interventions unsuccessful or patient reports new pain  Outcome: Progressing     Problem: SAFETY ADULT  Goal: Maintain or return to baseline ADL function  Description: INTERVENTIONS:  -  Assess patient's ability to carry out ADLs; assess patient's baseline for ADL function and identify physical deficits which impact ability to perform ADLs (bathing, care of mouth/teeth, toileting, grooming, dressing, etc )  - Assess/evaluate cause of self-care deficits   - Assess range of motion  - Assess patient's mobility; develop plan if impaired  - Assess patient's need for assistive devices and provide as appropriate  - Encourage maximum independence but intervene and supervise when necessary  - Involve family in performance of ADLs  - Assess for home care needs following discharge   - Consider OT consult to assist with ADL evaluation and planning for discharge  - Provide patient education as appropriate  Outcome: Progressing  Goal: Maintains/Returns to pre admission functional level  Description: INTERVENTIONS:  - Perform BMAT or MOVE assessment daily    - Set and communicate daily mobility goal to care team and patient/family/caregiver  - Collaborate with rehabilitation services on mobility goals if consulted  - Perform Range of Motion 2 times a day  - Reposition patient every 2 hours    - Dangle patient 3 times a day  - Stand patient 3 times a day  - Ambulate patient 3 times a day  - Out of bed to chair 3 times a day   - Out of bed for meals 3 times a day  - Out of bed for toileting  - Record patient progress and toleration of activity level   Outcome: Progressing  Goal: Patient will remain free of falls  Description: INTERVENTIONS:  - Educate patient/family on patient safety including physical limitations  - Instruct patient to call for assistance with activity   - Consult OT/PT to assist with strengthening/mobility   - Keep Call bell within reach  - Keep bed low and locked with side rails adjusted as appropriate  - Keep care items and personal belongings within reach  - Initiate and maintain comfort rounds  - Make Fall Risk Sign visible to staff  - Offer Toileting every 2 Hours, in advance of need  - Initiate/Maintain bed alarm  - Obtain necessary fall risk management equipment: non skid socks  - Apply yellow socks and bracelet for high fall risk patients  - Consider moving patient to room near nurses station  Outcome: Progressing     Problem: SAFETY ADULT  Goal: Maintains/Returns to pre admission functional level  Description: INTERVENTIONS:  - Perform BMAT or MOVE assessment daily    - Set and communicate daily mobility goal to care team and patient/family/caregiver  - Collaborate with rehabilitation services on mobility goals if consulted  - Perform Range of Motion 3 times a day  - Reposition patient every 2 hours  - Dangle patient 3 times a day  - Stand patient 3 times a day  - Ambulate patient 3 times a day  - Out of bed to chair 3 times a day   - Out of bed for meals 3 times a day  - Out of bed for toileting  - Record patient progress and toleration of activity level   Outcome: Progressing     Problem: DISCHARGE PLANNING  Goal: Discharge to home or other facility with appropriate resources  Description: INTERVENTIONS:  - Identify barriers to discharge w/patient and caregiver  - Arrange for needed discharge resources and transportation as appropriate  - Identify discharge learning needs (meds, wound care, etc )  - Arrange for interpretive services to assist at discharge as needed  - Refer to Case Management Department for coordinating discharge planning if the patient needs post-hospital services based on physician/advanced practitioner order or complex needs related to functional status, cognitive ability, or social support system  Outcome: Progressing     Problem: Knowledge Deficit  Goal: Patient/family/caregiver demonstrates understanding of disease process, treatment plan, medications, and discharge instructions  Description: Complete learning assessment and assess knowledge base    Interventions:  - Provide teaching at level of understanding  - Provide teaching via preferred learning methods  Outcome: Progressing     Problem: RESPIRATORY - ADULT  Goal: Achieves optimal ventilation and oxygenation  Description: INTERVENTIONS:  - Assess for changes in respiratory status  - Assess for changes in mentation and behavior  - Position to facilitate oxygenation and minimize respiratory effort  - Oxygen administered by appropriate delivery if ordered  - Initiate smoking cessation education as indicated  - Encourage broncho-pulmonary hygiene including cough, deep breathe, Incentive Spirometry  - Assess the need for suctioning and aspirate as needed  - Assess and instruct to report SOB or any respiratory difficulty  - Respiratory Therapy support as indicated  Outcome: Progressing     Problem: Nutrition/Hydration-ADULT  Goal: Nutrient/Hydration intake appropriate for improving, restoring or maintaining nutritional needs  Description: Monitor and assess patient's nutrition/hydration status for malnutrition  Collaborate with interdisciplinary team and initiate plan and interventions as ordered  Monitor patient's weight and dietary intake as ordered or per policy  Utilize nutrition screening tool and intervene as necessary  Determine patient's food preferences and provide high-protein, high-caloric foods as appropriate       INTERVENTIONS:  - Monitor oral intake, urinary output, labs, and treatment plans  - Assess nutrition and hydration status and recommend course of action  - Evaluate amount of meals eaten  - Assist patient with eating if necessary   - Allow adequate time for meals  - Recommend/ encourage appropriate diets, oral nutritional supplements, and vitamin/mineral supplements  - Order, calculate, and assess calorie counts as needed  - Recommend, monitor, and adjust tube feedings and TPN/PPN based on assessed needs  - Assess need for intravenous fluids  - Provide specific nutrition/hydration education as appropriate  - Include patient/family/caregiver in decisions related to nutrition  Outcome: Progressing

## 2023-05-27 NOTE — ASSESSMENT & PLAN NOTE
· CTAP in ED concerning for small bowel obstruction  · Patient had bowel movements day of admission and is still passing gas  · Evaluated by surgery, felt to be due to gastroenteritis vs full bowel obstruction  · No prior abdominal surgeries    · Repeat CTAP with oral contrast 5/25 without evidence of small bowel obstruction, as contrast was noted in the colon  · NG tube discontinued  · Evaluated by speech therapy and recommended dysphagia diet with nectar thin liquids  Patient tolerating this diet well  · Patient denies any respiratory distress

## 2023-05-27 NOTE — ASSESSMENT & PLAN NOTE
· Per discussion with patient's RN  Patient had an ED visit at [facility] in August 2022 for a rule out ACS  CT chest obtained at that time had no lesions or abnormalities  · Has subsequently developed right-sided pleural effusions  He has had 3 thoracenteses all draining greater than 1500 cc with negative cytologies  RN unsure whether transudative or exudative  · Patient does follow with pulmonology at [facility]  · S/p IR thoracentesis 5/25 with 1800 mL of clear yellow output  · Exudative effusion, per Lights criteria  · 1559 WBCs, lymphocyte predominant 82%  pH 7 7  Glucose 91  No bacteria on Gram stain    Culture pending    Plan:  Monitor respiratory status  Patient should follow-up with pulmonology as an outpatient

## 2023-05-27 NOTE — PROGRESS NOTES
[facility]  Progress Note  Name: Kacey Mendez  MRN: [id number]  Unit/Bed#: W -01 I Date of Admission: 5/24/2023   Date of Service: 5/27/2023 I Hospital Day: 3    Assessment/Plan   * Small bowel obstruction, partial ([address] )  Assessment & Plan  · CTAP in ED concerning for small bowel obstruction  · Patient had bowel movements day of admission and is still passing gas  · Evaluated by surgery, felt to be due to gastroenteritis vs full bowel obstruction  · No prior abdominal surgeries    · Repeat CTAP with oral contrast 5/25 without evidence of small bowel obstruction, as contrast was noted in the colon  · NG tube discontinued  · Evaluated by speech therapy and recommended dysphagia diet with nectar thin liquids  Patient tolerating this diet well  · Patient denies any respiratory distress  Recurrent pleural effusion on right  Assessment & Plan  · Per discussion with patient's RN  Patient had an ED visit at [facility] in August 2022 for a rule out ACS  CT chest obtained at that time had no lesions or abnormalities  · Has subsequently developed right-sided pleural effusions  He has had 3 thoracenteses all draining greater than 1500 cc with negative cytologies  RN unsure whether transudative or exudative  · Patient does follow with pulmonology at [facility]  · S/p IR thoracentesis 5/25 with 1800 mL of clear yellow output  · Exudative effusion, per Lights criteria  · 1559 WBCs, lymphocyte predominant 82%  pH 7 7  Glucose 91  No bacteria on Gram stain  Culture pending    Plan:  Monitor respiratory status  Patient should follow-up with pulmonology as an outpatient    Aspiration pneumonitis [facility])  Assessment & Plan  · Caregiver reports patient eats very rapidly and often coughs/chokes during eating  · Rhonchorous breath sounds in the emergency department  · Received dose of antibiotics in the emergency department  · Leukocytosis resolved  procalcitonin WNL  Afebrile  Plan:  · Supplemental O2 as needed for O2 saturation greater than 94%  · Hold further antibiotics for now  · DuoNebs every 6 hours    Acute anemia  Assessment & Plan  · Hemoglobin dropped from 14 2 on admission to 10 8 this morning  · No definite bleeding source  No hematochezia/melena, hematuria, hemoptysis, hematemesis, hemolysis  · Possible delayed drop? NGT output was initially dark presentation, clear the following morning  · Vital signs stable  Plan  · Hemoglobin stable, continue to monitor with daily CBC  · Monitor for any signs of bleeding  · Hold DVT prophylaxis, ambulate patient  · Monitor vitals closely    Pancreatic abnormality  Assessment & Plan  · Incidentally noted pancreatic tail nodule  · Non-urgent MRI recommended           Pharmacologic VTE Prophylaxis: Yes   Mechanical VTE Prophylaxis in Place: No   Patient Centered Rounds: I have performed bedside rounds with the Nursing staff today  Current Length of Stay: 3 day(s)  Current Patient Status: Inpatient   Code Status: Level 1 - Full Code  Time Spent for Care:  35 minutes  More than 50% of total time spent on counseling and coordination of care as described above  Discussions with Specialists or Other Care Team Provider: Yes surgical team  Education and Discussions with Family / Patient: Yes, Updated  Maria Isabel Augustin  Discharge Plan: 5/30/23  Case Discussed with  regarding updating plan of care and disposition planning  Certification Statement: The patient will continue to require additional inpatient hospital stay due to Dysphagia, Aspiration Pneumonitis  Subjective:   I have seen and Examined the patient at the bedside  No CP or Sob  No fevers or chills, No nausea or vomiting  Overnight events reviewed with the RN  No Other complains  Tolerating Dysphagia diet  Review of System:   Denies any CP or SOB  Denies any Cough or Cold  Denies any Fevers or chills     Denies any focal tingling numbness or weakness in any extremities  Denies any abdominal pain, Nausea or vomiting  Objective:   Temp (24hrs), Av 8 °F (36 6 °C), Min:97 °F (36 1 °C), Max:98 6 °F (37 °C)    Temp:  [97 °F (36 1 °C)-98 6 °F (37 °C)] 98 6 °F (37 °C)  HR:  [55-66] 59  Resp:  [16] 16  BP: (122-151)/(68-91) 151/75  SpO2:  [97 %-98 %] 98 %  Body mass index is 42 52 kg/m²  Input and Output Summary (last 24 hours): Intake/Output Summary (Last 24 hours) at 2023 1625  Last data filed at 2023 0522  Gross per 24 hour   Intake 1266 67 ml   Output 425 ml   Net 841 67 ml     I/O        0701   0700  0701   0700  0701   0700    P  O  0 0     I V  (mL/kg) 970 4 (8 2) 1266 7 (10 6)     NG/GT 1020      IV Piggyback 50      Total Intake(mL/kg) 2040 4 (17 1) 1266 7 (10 6)     Urine (mL/kg/hr) 750 (0 3) 425 (0 1)     Emesis/NG output 250      Total Output 1000 425     Net +1040 4 +841  7            Unmeasured Urine Occurrence  1 x 1 x    Unmeasured Stool Occurrence   1 x          Physical Exam:   General Exam: Alert and Oriented x 2, NAD  Coughing while examining  Eyes: JUAN  Neck: Supple  CVS: S1, S2 Todd, RRR    R/S: Bilateral rhonchi heard improved from yesterday  Abd: Soft, NT, ND, BS+ve  Extremities: No edema noted  Skin: No acute Rash noted  CNS: No acute FND  Moves all 4 extremities  Psych: Co-operative, Not agitated     Additional Data:     Labs, Culture & Imaging Data Reviewed:    Results from last 7 days   Lab Units 23   HEMATOCRIT % 32 7*   HEMOGLOBIN g/dL 10 5*   PLATELETS Thousands/uL 214   WBC Thousand/uL 5 40     Results from last 7 days   Lab Units 23   ALK PHOS U/L  --  36   ALT U/L  --  4*   AST U/L  --  8*   BUN mg/dL 19 26*   CALCIUM mg/dL 7 7* 7 8*   CHLORIDE mmol/L 108 108   CO2 mmol/L 26 25   CREATININE mg/dL 0 94 1 15   POTASSIUM mmol/L 4 0 4 1     Results from last 7 days   Lab Units 23  1238   INR  1 12     Lab Results   Component Value Date    HGBA1C 5 1 01/21/2023      IR IN-Patient Thoracentesis   Final Result by Enzo Hairston MD (05/25 1610)   Impression:   1  Successful ultrasound-guided thoracentesis yielding 1800 cc of right clear pleural fluid  Workstation performed: [id number]         CT abdomen pelvis wo contrast   Final Result by Fabby Arizmendi MD (05/25 1149)      No bowel obstruction; the oral contrast column has transited into the mid descending colon  Scattered areas of small bowel wall thickening in keeping with a nonspecific enteritis  Circumferential bladder wall thickening may indicate cystitis but is of a nonspecific degree  The study was marked in [facility] for immediate notification  Workstation performed: [id number]         XR chest portable   Final Result by Joi Pedersen MD (05/26 1302)      Enteric tube with tip in the region of the stomach, however the portion where the sideholes begin is in the distal esophagus  Recommend advancing at least 5 cm  Unchanged moderate right pleural effusion and veil-like opacities in the right lung  The study was marked in [facility] for immediate notification  Workstation performed: [id number]         XR chest 1 view portable   ED Interpretation by Garrison Mcmillan PA-C (05/24 9408)   NG tube in optimal position  Unchanged cardiopulmonary process from previous  Final Result by María Correa MD (05/25 9705)      Persistent right basilar opacity, likely due to a combination of atelectasis and moderate pleural effusion although pneumonic airspace consolidation is not excluded  Workstation performed: [id number]         CT abdomen pelvis with contrast   Final Result by Young Florez MD (05/24 5685)      1  Small bowel obstruction with transition point in the right lower quadrant  Etiology unclear in the absence of prior surgical history that would result in adhesions   Evaluation of "the distal/terminal ileum is limited due to underdistention but    difficult to exclude some infectious or inflammatory wall thickening of the terminal ileum, which would raise the possibility of inflammatory bowel disease such as Crohn's disease with stricture  Obstruction due to occult small bowel mass is also in the    differential although no discrete mass is seen  2   Rounded noncalcified hyperattenuating structure in the pancreatic tail region measuring 1 5 x 1 6 cm which may be due to a tumor nodule (i e  neuroendocrine tumor), intrapancreatic splenule, or a noncalcified aneurysm although incompletely    characterized on this single phase study  Recommend nonemergent contrast-enhanced MR abdomen  3   Large right pleural effusion with near complete right lower lobe collapse  4   Diffuse esophageal wall thickening (i e  esophagitis)  Fluid within the distal esophagus can be due to reflux  Correlation with endoscopy can be considered  I personally discussed this study with Filiberto Skiff on 5/24/2023 4:25 PM                   Workstation performed: [id number]         XR chest 1 view portable   ED Interpretation by Alem Hilliard PA-C (05/24 124)   Right pleural effusion, with possible consolidation in right middle lobe  Final Result by Kendrick Leal MD (05/24 1730)      Right basilar opacity with associated effusion, possibly pneumonia  This report is in agreement with the preliminary interpretation                       Workstation performed: [id number]             Cultures:   Blood Culture:   Lab Results   Component Value Date    BLOODCX No Growth at 72 hrs  05/24/2023    BLOODCX No Growth at 72 hrs  05/24/2023     Urine Culture: No results found for: \"URINECX\"  Sputum Culture: No components found for: \"SPUTUMCX\"  Wound Culture: No results found for: \"WOUNDCULT\"    Last 24 Hours Medication List:   Current Facility-Administered Medications   Medication Dose Route " Frequency Provider Last Rate   • allopurinol  100 mg Oral Daily Grady Aragon MD     • colchicine  0 6 mg Oral Daily Grady Aragon MD     • dextrose 5 % and sodium chloride 0 45 %  100 mL/hr Intravenous Continuous Analilia Herrera  mL/hr (05/27/23 0001)   • divalproex sodium  500 mg Oral Q12H [address] MD Idris     • escitalopram  20 mg Oral Daily Grady Aragon MD     • losartan  100 mg Oral Daily Grady Aragon MD     • metoprolol  2 5 mg Intravenous Q6H PRN Parth Aragon MD     • montelukast  10 mg Oral Daily Grady Aragon MD     • pantoprazole  40 mg Intravenous Q24H [address] MD Idris     • risperiDONE  1 5 mg Oral BID Grady Aragon MD     • tamsulosin  0 8 mg Oral Daily With Dinner Grady Aragon MD           Patient is at moderate risk for morbidity and mortality due to above mentioned illness and comorbidities

## 2023-05-27 NOTE — PLAN OF CARE
Problem: MOBILITY - ADULT  Goal: Maintain or return to baseline ADL function  Description: INTERVENTIONS:  -  Assess patient's ability to carry out ADLs; assess patient's baseline for ADL function and identify physical deficits which impact ability to perform ADLs (bathing, care of mouth/teeth, toileting, grooming, dressing, etc )  - Assess/evaluate cause of self-care deficits   - Assess range of motion  - Assess patient's mobility; develop plan if impaired  - Assess patient's need for assistive devices and provide as appropriate  - Encourage maximum independence but intervene and supervise when necessary  - Involve family in performance of ADLs  - Assess for home care needs following discharge   - Consider OT consult to assist with ADL evaluation and planning for discharge  - Provide patient education as appropriate  Outcome: Progressing  Goal: Maintains/Returns to pre admission functional level  Description: INTERVENTIONS:  - Perform BMAT or MOVE assessment daily    - Set and communicate daily mobility goal to care team and patient/family/caregiver  - Collaborate with rehabilitation services on mobility goals if consulted  - Perform Range of Motion 3 times a day  - Reposition patient every 3 hours    - Dangle patient 3 times a day  - Stand patient 3 times a day  - Ambulate patient 3 times a day  - Out of bed to chair 3 times a day   - Out of bed for meals 3 times a day  - Out of bed for toileting  - Record patient progress and toleration of activity level   Outcome: Progressing     Problem: Prexisting or High Potential for Compromised Skin Integrity  Goal: Skin integrity is maintained or improved  Description: INTERVENTIONS:  - Identify patients at risk for skin breakdown  - Assess and monitor skin integrity  - Assess and monitor nutrition and hydration status  - Monitor labs   - Assess for incontinence   - Turn and reposition patient  - Assist with mobility/ambulation  - Relieve pressure over bony prominences  - Avoid friction and shearing  - Provide appropriate hygiene as needed including keeping skin clean and dry  - Evaluate need for skin moisturizer/barrier cream  - Collaborate with interdisciplinary team   - Patient/family teaching  - Consider wound care consult   Outcome: Progressing     Problem: PAIN - ADULT  Goal: Verbalizes/displays adequate comfort level or baseline comfort level  Description: Interventions:  - Encourage patient to monitor pain and request assistance  - Assess pain using appropriate pain scale  - Administer analgesics based on type and severity of pain and evaluate response  - Implement non-pharmacological measures as appropriate and evaluate response  - Consider cultural and social influences on pain and pain management  - Notify physician/advanced practitioner if interventions unsuccessful or patient reports new pain  Outcome: Progressing     Problem: SAFETY ADULT  Goal: Maintain or return to baseline ADL function  Description: INTERVENTIONS:  -  Assess patient's ability to carry out ADLs; assess patient's baseline for ADL function and identify physical deficits which impact ability to perform ADLs (bathing, care of mouth/teeth, toileting, grooming, dressing, etc )  - Assess/evaluate cause of self-care deficits   - Assess range of motion  - Assess patient's mobility; develop plan if impaired  - Assess patient's need for assistive devices and provide as appropriate  - Encourage maximum independence but intervene and supervise when necessary  - Involve family in performance of ADLs  - Assess for home care needs following discharge   - Consider OT consult to assist with ADL evaluation and planning for discharge  - Provide patient education as appropriate  Outcome: Progressing  Goal: Maintains/Returns to pre admission functional level  Description: INTERVENTIONS:  - Perform BMAT or MOVE assessment daily    - Set and communicate daily mobility goal to care team and patient/family/caregiver  - Collaborate with rehabilitation services on mobility goals if consulted  - Perform Range of Motion 3 times a day  - Reposition patient every 3 hours    - Dangle patient 3 times a day  - Stand patient 3 times a day  - Ambulate patient 3 times a day  - Out of bed to chair 3 times a day   - Out of bed for meals 3 times a day  - Out of bed for toileting  - Record patient progress and toleration of activity level   Outcome: Progressing  Goal: Patient will remain free of falls  Description: INTERVENTIONS:  - Educate patient/family on patient safety including physical limitations  - Instruct patient to call for assistance with activity   - Consult OT/PT to assist with strengthening/mobility   - Keep Call bell within reach  - Keep bed low and locked with side rails adjusted as appropriate  - Keep care items and personal belongings within reach  - Initiate and maintain comfort rounds  - Make Fall Risk Sign visible to staff  - Offer Toileting every  Hours, in advance of need  - Initiate/Maintain alarm  - Obtain necessary fall risk management equipment:   - Apply yellow socks and bracelet for high fall risk patients  - Consider moving patient to room near nurses station  Outcome: Progressing     Problem: DISCHARGE PLANNING  Goal: Discharge to home or other facility with appropriate resources  Description: INTERVENTIONS:  - Identify barriers to discharge w/patient and caregiver  - Arrange for needed discharge resources and transportation as appropriate  - Identify discharge learning needs (meds, wound care, etc )  - Arrange for interpretive services to assist at discharge as needed  - Refer to Case Management Department for coordinating discharge planning if the patient needs post-hospital services based on physician/advanced practitioner order or complex needs related to functional status, cognitive ability, or social support system  Outcome: Progressing     Problem: Knowledge Deficit  Goal: Patient/family/caregiver demonstrates understanding of disease process, treatment plan, medications, and discharge instructions  Description: Complete learning assessment and assess knowledge base  Interventions:  - Provide teaching at level of understanding  - Provide teaching via preferred learning methods  Outcome: Progressing     Problem: RESPIRATORY - ADULT  Goal: Achieves optimal ventilation and oxygenation  Description: INTERVENTIONS:  - Assess for changes in respiratory status  - Assess for changes in mentation and behavior  - Position to facilitate oxygenation and minimize respiratory effort  - Oxygen administered by appropriate delivery if ordered  - Initiate smoking cessation education as indicated  - Encourage broncho-pulmonary hygiene including cough, deep breathe, Incentive Spirometry  - Assess the need for suctioning and aspirate as needed  - Assess and instruct to report SOB or any respiratory difficulty  - Respiratory Therapy support as indicated  Outcome: Progressing     Problem: Nutrition/Hydration-ADULT  Goal: Nutrient/Hydration intake appropriate for improving, restoring or maintaining nutritional needs  Description: Monitor and assess patient's nutrition/hydration status for malnutrition  Collaborate with interdisciplinary team and initiate plan and interventions as ordered  Monitor patient's weight and dietary intake as ordered or per policy  Utilize nutrition screening tool and intervene as necessary  Determine patient's food preferences and provide high-protein, high-caloric foods as appropriate       INTERVENTIONS:  - Monitor oral intake, urinary output, labs, and treatment plans  - Assess nutrition and hydration status and recommend course of action  - Evaluate amount of meals eaten  - Assist patient with eating if necessary   - Allow adequate time for meals  - Recommend/ encourage appropriate diets, oral nutritional supplements, and vitamin/mineral supplements  - Order, calculate, and assess calorie counts as needed  - Recommend, monitor, and adjust tube feedings and TPN/PPN based on assessed needs  - Assess need for intravenous fluids  - Provide specific nutrition/hydration education as appropriate  - Include patient/family/caregiver in decisions related to nutrition  Outcome: Progressing

## 2023-05-27 NOTE — SPEECH THERAPY NOTE
"Speech Language/Pathology     Speech/Language Pathology Progress Note     Patient Name: Digna Ruano    Today's Date: 5/27/2023     Problem List  Principal Problem:    Small bowel obstruction, partial ([facility] [address] )  Active Problems:    Bipolar disorder ([facility] [address] )    Essential hypertension    Mild persistent asthma without complication    LUPIS (obstructive sleep apnea)    Recurrent pleural effusion on right    Esophagitis    Pancreatic abnormality    Aspiration pneumonitis (HCC)    JENNA (acute kidney injury) ([facility] [address] )    Acute anemia    Subjective:  Patient received awake and alert, breakfast tray arrived and set up by this writer  \"I remember you\"    Previous/current diet: surg soft/nectar     Objective: The following consistencies were tested: soft solids, nectar thick by cup, thin liquids by cup and straw  Patient self-feeds large bites with signs of impulsivity, overstuffing eggs  Delayed cough w/ same x1  Solids cut into smaller quantities with close verbal cues and use of liquid wash which appears to improve oral efficiency and clearance  Self feeds nectar thick sips by cup, transitioned to single and successive sips of thin liquids by cup and straw  Overall pt consumed 16oz thin liquid  Laryngeal rise palpated; pt appears to elicit timely, well-coordinated swallows while taking large consecutive sips of thin liquids by cup and straw  Occasional audible gulp & belching noted  No overt s/s of aspiration or distress at any point, O2 saturation remains WNL and vocal quality noted to remain clear  Assessment:  Improvement in swallow function noted at bedside today; may be secondary to improved engagement  S/s of dysphagia appear to be related to behavioral feeding difficulties (ie impulsivity, overstuffing w/ limited self-monitoring) v  mechanical swallowing impairment based off of observations at bedside today          Plan:  Diet Recommendation: soft/pre-cut solids w/ added moisture and thin liquids " Aspiration precautions posted; small bites/sips, monitor for impulsivity, alternate solids and liquids  Close monitoring and supervision during meals  Recommend to d/c instrumental/MBS for now given improvement noted at bedside today when compared to prev encounters  Should concerns arise in the future may consider returning from [facility] home for an outpatient swallow study     ST will follow up at bedside 1-2x      In[address], [address] Pathologist  [place] #[id number]  [place] #[id number]

## 2023-05-27 NOTE — PLAN OF CARE
Plan:  Diet Recommendation: soft/pre-cut solids w/ added moisture and thin liquids   Aspiration precautions posted; small bites/sips, monitor for impulsivity, alternate solids and liquids  Close monitoring and supervision during meals  Recommend to d/c instrumental/MBS for now given improvement noted at bedside today when compared to prev encounters  Should concerns arise in the future may consider returning from group home for an outpatient swallow study     ST will follow up at bedside 1-2x

## 2023-05-28 PROBLEM — N17.9 AKI (ACUTE KIDNEY INJURY) (HCC): Status: RESOLVED | Noted: 2023-05-25 | Resolved: 2023-05-28

## 2023-05-28 LAB
BACTERIA SPEC BFLD CULT: NO GROWTH
GRAM STN SPEC: NORMAL
GRAM STN SPEC: NORMAL

## 2023-05-28 RX ORDER — SENNOSIDES 8.6 MG
2 TABLET ORAL
Status: DISCONTINUED | OUTPATIENT
Start: 2023-05-28 | End: 2023-05-30 | Stop reason: HOSPADM

## 2023-05-28 RX ORDER — PANTOPRAZOLE SODIUM 40 MG/10ML
40 INJECTION, POWDER, LYOPHILIZED, FOR SOLUTION INTRAVENOUS
Status: COMPLETED | OUTPATIENT
Start: 2023-05-28 | End: 2023-05-28

## 2023-05-28 RX ORDER — PANTOPRAZOLE SODIUM 40 MG/1
40 TABLET, DELAYED RELEASE ORAL
Status: DISCONTINUED | OUTPATIENT
Start: 2023-05-29 | End: 2023-05-30 | Stop reason: HOSPADM

## 2023-05-28 RX ORDER — DOCUSATE SODIUM 100 MG/1
100 CAPSULE, LIQUID FILLED ORAL 2 TIMES DAILY
Status: DISCONTINUED | OUTPATIENT
Start: 2023-05-28 | End: 2023-05-30 | Stop reason: HOSPADM

## 2023-05-28 RX ADMIN — TAMSULOSIN HYDROCHLORIDE 0.8 MG: 0.4 CAPSULE ORAL at 17:10

## 2023-05-28 RX ADMIN — PANTOPRAZOLE SODIUM 40 MG: 40 INJECTION, POWDER, FOR SOLUTION INTRAVENOUS at 10:00

## 2023-05-28 RX ADMIN — SENNOSIDES 17.2 MG: 8.6 TABLET, FILM COATED ORAL at 21:14

## 2023-05-28 RX ADMIN — DIVALPROEX SODIUM 500 MG: 500 TABLET, DELAYED RELEASE ORAL at 10:00

## 2023-05-28 RX ADMIN — ESCITALOPRAM OXALATE 20 MG: 20 TABLET ORAL at 10:00

## 2023-05-28 RX ADMIN — COLCHICINE 0.6 MG: 0.6 TABLET ORAL at 10:00

## 2023-05-28 RX ADMIN — ALLOPURINOL 100 MG: 100 TABLET ORAL at 10:00

## 2023-05-28 RX ADMIN — DIVALPROEX SODIUM 500 MG: 500 TABLET, DELAYED RELEASE ORAL at 21:14

## 2023-05-28 RX ADMIN — RISPERIDONE 1.5 MG: 0.5 TABLET, ORALLY DISINTEGRATING ORAL at 10:00

## 2023-05-28 RX ADMIN — MONTELUKAST 10 MG: 10 TABLET, FILM COATED ORAL at 10:00

## 2023-05-28 RX ADMIN — LOSARTAN POTASSIUM 100 MG: 50 TABLET, FILM COATED ORAL at 10:00

## 2023-05-28 RX ADMIN — RISPERIDONE 1.5 MG: 0.5 TABLET, ORALLY DISINTEGRATING ORAL at 17:10

## 2023-05-28 NOTE — ASSESSMENT & PLAN NOTE
· CTAP in ED concerning for small bowel obstruction  · Patient had bowel movements day of admission and is still passing gas  · Evaluated by surgery, felt to be due to gastroenteritis vs full bowel obstruction  · No prior abdominal surgeries  · Repeat CTAP with oral contrast 5/25 without evidence of small bowel obstruction, as contrast was noted in the colon  · Patient denies any abdominal pain  Is passing gas      Plan  · NG tube discontinued  · Monitor  · Although patient does have a history of chronic diarrhea, given he has not had a bowel movement 3 days, consider laxative

## 2023-05-28 NOTE — PLAN OF CARE
Problem: MOBILITY - ADULT  Goal: Maintain or return to baseline ADL function  Description: INTERVENTIONS:  -  Assess patient's ability to carry out ADLs; assess patient's baseline for ADL function and identify physical deficits which impact ability to perform ADLs (bathing, care of mouth/teeth, toileting, grooming, dressing, etc )  - Assess/evaluate cause of self-care deficits   - Assess range of motion  - Assess patient's mobility; develop plan if impaired  - Assess patient's need for assistive devices and provide as appropriate  - Encourage maximum independence but intervene and supervise when necessary  - Involve family in performance of ADLs  - Assess for home care needs following discharge   - Consider OT consult to assist with ADL evaluation and planning for discharge  - Provide patient education as appropriate  Outcome: Progressing  Goal: Maintains/Returns to pre admission functional level  Description: INTERVENTIONS:  - Perform BMAT or MOVE assessment daily    - Set and communicate daily mobility goal to care team and patient/family/caregiver     - Collaborate with rehabilitation services on mobility goals if consulted  - Dangle patient 3 times a day  - Stand patient 6 times a day  - Ambulate patient 4 times a day  - Out of bed to chair 3 times a day   - Out of bed for meals 3 times a day  - Out of bed for toileting  - Record patient progress and toleration of activity level   Outcome: Progressing     Problem: Prexisting or High Potential for Compromised Skin Integrity  Goal: Skin integrity is maintained or improved  Description: INTERVENTIONS:  - Identify patients at risk for skin breakdown  - Assess and monitor skin integrity  - Assess and monitor nutrition and hydration status  - Monitor labs   - Assess for incontinence   - Turn and reposition patient  - Assist with mobility/ambulation  - Relieve pressure over bony prominences  - Avoid friction and shearing  - Provide appropriate hygiene as needed including keeping skin clean and dry  - Evaluate need for skin moisturizer/barrier cream  - Collaborate with interdisciplinary team   - Patient/family teaching  - Consider wound care consult   Outcome: Progressing     Problem: PAIN - ADULT  Goal: Verbalizes/displays adequate comfort level or baseline comfort level  Description: Interventions:  - Encourage patient to monitor pain and request assistance  - Assess pain using appropriate pain scale  - Administer analgesics based on type and severity of pain and evaluate response  - Implement non-pharmacological measures as appropriate and evaluate response  - Consider cultural and social influences on pain and pain management  - Notify physician/advanced practitioner if interventions unsuccessful or patient reports new pain  Outcome: Progressing     Problem: SAFETY ADULT  Goal: Maintain or return to baseline ADL function  Description: INTERVENTIONS:  -  Assess patient's ability to carry out ADLs; assess patient's baseline for ADL function and identify physical deficits which impact ability to perform ADLs (bathing, care of mouth/teeth, toileting, grooming, dressing, etc )  - Assess/evaluate cause of self-care deficits   - Assess range of motion  - Assess patient's mobility; develop plan if impaired  - Assess patient's need for assistive devices and provide as appropriate  - Encourage maximum independence but intervene and supervise when necessary  - Involve family in performance of ADLs  - Assess for home care needs following discharge   - Consider OT consult to assist with ADL evaluation and planning for discharge  - Provide patient education as appropriate  Outcome: Progressing  Goal: Maintains/Returns to pre admission functional level  Description: INTERVENTIONS:  - Perform BMAT or MOVE assessment daily    - Set and communicate daily mobility goal to care team and patient/family/caregiver     - Collaborate with rehabilitation services on mobility goals if consulted  - Dangle patient 3 times a day  - Stand patient 6 times a day  - Ambulate patient 4 times a day  - Out of bed to chair 3 times a day   - Out of bed for meals 3 times a day  - Out of bed for toileting  - Record patient progress and toleration of activity level   Outcome: Progressing  Goal: Patient will remain free of falls  Description: INTERVENTIONS:  - Educate patient/family on patient safety including physical limitations  - Instruct patient to call for assistance with activity   - Consult OT/PT to assist with strengthening/mobility   - Keep Call bell within reach  - Keep bed low and locked with side rails adjusted as appropriate  - Keep care items and personal belongings within reach  - Initiate and maintain comfort rounds  - Make Fall Risk Sign visible to staff  - Offer Toileting every 2 Hours, in advance of need  - Initiate/Maintain bed/chair alarm  - Obtain necessary fall risk management equipment: walker  - Apply yellow socks and bracelet for high fall risk patients  - Consider moving patient to room near nurses station  Outcome: Progressing     Problem: DISCHARGE PLANNING  Goal: Discharge to home or other facility with appropriate resources  Description: INTERVENTIONS:  - Identify barriers to discharge w/patient and caregiver  - Arrange for needed discharge resources and transportation as appropriate  - Identify discharge learning needs (meds, wound care, etc )  - Arrange for interpretive services to assist at discharge as needed  - Refer to Case Management Department for coordinating discharge planning if the patient needs post-hospital services based on physician/advanced practitioner order or complex needs related to functional status, cognitive ability, or social support system  Outcome: Progressing     Problem: Knowledge Deficit  Goal: Patient/family/caregiver demonstrates understanding of disease process, treatment plan, medications, and discharge instructions  Description: Complete learning assessment and assess knowledge base  Interventions:  - Provide teaching at level of understanding  - Provide teaching via preferred learning methods  Outcome: Progressing     Problem: RESPIRATORY - ADULT  Goal: Achieves optimal ventilation and oxygenation  Description: INTERVENTIONS:  - Assess for changes in respiratory status  - Assess for changes in mentation and behavior  - Position to facilitate oxygenation and minimize respiratory effort  - Oxygen administered by appropriate delivery if ordered  - Initiate smoking cessation education as indicated  - Encourage broncho-pulmonary hygiene including cough, deep breathe, Incentive Spirometry  - Assess the need for suctioning and aspirate as needed  - Assess and instruct to report SOB or any respiratory difficulty  - Respiratory Therapy support as indicated  Outcome: Progressing     Problem: Nutrition/Hydration-ADULT  Goal: Nutrient/Hydration intake appropriate for improving, restoring or maintaining nutritional needs  Description: Monitor and assess patient's nutrition/hydration status for malnutrition  Collaborate with interdisciplinary team and initiate plan and interventions as ordered  Monitor patient's weight and dietary intake as ordered or per policy  Utilize nutrition screening tool and intervene as necessary  Determine patient's food preferences and provide high-protein, high-caloric foods as appropriate       INTERVENTIONS:  - Monitor oral intake, urinary output, labs, and treatment plans  - Assess nutrition and hydration status and recommend course of action  - Evaluate amount of meals eaten  - Assist patient with eating if necessary   - Allow adequate time for meals  - Recommend/ encourage appropriate diets, oral nutritional supplements, and vitamin/mineral supplements  - Order, calculate, and assess calorie counts as needed  - Recommend, monitor, and adjust tube feedings and TPN/PPN based on assessed needs  - Assess need for intravenous fluids  - Provide specific nutrition/hydration education as appropriate  - Include patient/family/caregiver in decisions related to nutrition  Outcome: Progressing

## 2023-05-28 NOTE — ASSESSMENT & PLAN NOTE
· Caregiver reports patient eats very rapidly and often coughs/chokes during eating  · Rhonchorous breath sounds in the emergency department  · Received dose of antibiotics in the emergency department  · Leukocytosis resolved  procalcitonin WNL  Afebrile  Plan:  · Supplemental O2 as needed for O2 saturation greater than 94%  · Hold further antibiotics for now  · DuoNebs every 6 hours  · Evaluated by speech therapy and recommended soft diet with thin liquids  Patient tolerating this diet well

## 2023-05-28 NOTE — PROGRESS NOTES
[facility]  Progress Note  Name: Aida Salgado  MRN: [id number]  Unit/Bed#: W -01 I Date of Admission: 5/24/2023   Date of Service: 5/28/2023 I Hospital Day: 4    Assessment/Plan   * Small bowel obstruction, partial ([address] )  Assessment & Plan  · CTAP in ED concerning for small bowel obstruction  · Patient had bowel movements day of admission and is still passing gas  · Evaluated by surgery, felt to be due to gastroenteritis vs full bowel obstruction  · No prior abdominal surgeries  · Repeat CTAP with oral contrast 5/25 without evidence of small bowel obstruction, as contrast was noted in the colon  · Patient denies any abdominal pain  Is passing gas  Plan  · NG tube discontinued  · Monitor    Aspiration pneumonitis [facility])  Assessment & Plan  · Caregiver reports patient eats very rapidly and often coughs/chokes during eating  · Rhonchorous breath sounds in the emergency department  · Received dose of antibiotics in the emergency department  · Leukocytosis resolved  procalcitonin WNL  Afebrile  Plan:  · Supplemental O2 as needed for O2 saturation greater than 94%  · Hold further antibiotics for now  · DuoNebs every 6 hours  · Evaluated by speech therapy and recommended soft diet with thin liquids  Patient tolerating this diet well  Recurrent pleural effusion on right  Assessment & Plan  · Per discussion with patient's RN  Patient had an ED visit at [facility] in August 2022 for a rule out ACS  CT chest obtained at that time had no lesions or abnormalities  · Has subsequently developed right-sided pleural effusions  He has had 3 thoracenteses all draining greater than 1500 cc with negative cytologies  RN unsure whether transudative or exudative  · Patient does follow with pulmonology at [facility]  · S/p IR thoracentesis 5/25 with 1800 mL of clear yellow output  · Exudative effusion, per Lights criteria    · 1559 WBCs, lymphocyte predominant 82%   pH 7 7  Glucose 91  No bacteria on Gram stain  Culture pending    Plan:  Monitor respiratory status  Patient should follow-up with pulmonology as an outpatient    Acute anemia  Assessment & Plan  · Hemoglobin dropped from 14 2 on admission to 10 8 this morning  · No definite bleeding source  No hematochezia/melena, hematuria, hemoptysis, hematemesis, hemolysis  · Possible delayed drop? NGT output was initially dark presentation, clear the following morning  · Vital signs stable  Plan  · Hemoglobin stable, continue to monitor with daily CBC  · Monitor for any signs of bleeding  · Hold DVT prophylaxis, ambulate patient  · Monitor vitals closely    JENNA (acute kidney injury) (HCC)-resolved as of 5/28/2023  Assessment & Plan  · Creatinine jumped from 1 2 on admission to 2 today  · Back down to 1 15   · Resolved    Esophagitis  Assessment & Plan  Evidence of esophagitis on CT  No noted history    Plan:  PPI  Patient does have appointment with outpatient GI for issue with chronic diarrhea, can follow-up about this esophagitis at that time    Pancreatic abnormality  Assessment & Plan  · Incidentally noted pancreatic tail nodule  · Non-urgent MRI recommended    LUPIS (obstructive sleep apnea)  Assessment & Plan  · Newly diagnosed via sleep study  · Not currently using CPAP as outpatient  · Patient agreeable to CPAP while in hospital    Mild persistent asthma without complication  Assessment & Plan  · Home Singulair  · Continue DuoNebs for aspiration pneumonitis    Essential hypertension  Assessment & Plan  Resume cozaar  Can add as needed medication if becomes hypertensive    Bipolar disorder ([facility] [address] )  Assessment & Plan  · Risperidone + Depakote           VTE Pharmacologic Prophylaxis: VTE Score: 5 High Risk (Score >/= 5) - Pharmacological DVT Prophylaxis Contraindicated  Sequential Compression Devices Ordered  Patient Centered Rounds: I performed bedside rounds with nursing staff today    Discussions with Specialists or Other Care Team Provider: none    Education and Discussions with Family / Patient: Updated  () via phone  Current Length of Stay: 4 day(s)  Current Patient Status: Inpatient   Discharge Plan: Anticipate discharge in 48 hrs to prior assisted or independent living facility  At this point, patient is medically stable for discharge  However, given that it is a holiday weekend, his facility is unable to accept him back at this time  Patient will likely remain in hospital until Tuesday, 2023  We will need to have a meeting within 24 hours prior to discharge with his facility, per their request     Code Status: Level 1 - Full Code    Subjective:   No acute events overnight  Patient does endorse some abdominal soreness this morning  Patient tells me that he has not had a bowel movement since admission  However, per RN, patient did have BM yesterday  He is still passing gas  He is tolerating oral diet well  Patient is ambulatory during the day  Does endorse a productive cough  No fever/chills  No urinary complaints  No chest pain or shortness of breath  Objective:     Vitals:   Temp (24hrs), Av 4 °F (36 9 °C), Min:98 2 °F (36 8 °C), Max:98 6 °F (37 °C)    Temp:  [98 2 °F (36 8 °C)-98 6 °F (37 °C)] 98 4 °F (36 9 °C)  HR:  [59-65] 61  Resp:  [16-18] 18  BP: (123-154)/(66-78) 123/66  SpO2:  [96 %-99 %] 97 %  Body mass index is 42 52 kg/m²  Input and Output Summary (last 24 hours): Intake/Output Summary (Last 24 hours) at 2023 0649  Last data filed at 2023 2100  Gross per 24 hour   Intake 360 ml   Output 120 ml   Net 240 ml       Physical Exam:   Physical Exam  Vitals and nursing note reviewed  Constitutional:       General: He is not in acute distress  Appearance: Normal appearance  He is well-developed  He is not ill-appearing or toxic-appearing  HENT:      Head: Normocephalic and atraumatic     Eyes:      Conjunctiva/sclera: Conjunctivae normal    Cardiovascular:      Rate and Rhythm: Normal rate and regular rhythm  Heart sounds: No murmur heard  Pulmonary:      Effort: Pulmonary effort is normal  No respiratory distress  Breath sounds: Rhonchi (improved from prior) present  Abdominal:      General: Abdomen is flat  Bowel sounds are normal  There is no distension  Palpations: Abdomen is soft  Tenderness: There is abdominal tenderness  There is no guarding  Comments:   Mild, generalized abdominal tenderness this morning  Normal bowel sounds   Musculoskeletal:         General: No swelling  Cervical back: Neck supple  Right lower leg: No edema  Left lower leg: No edema  Skin:     General: Skin is warm and dry  Capillary Refill: Capillary refill takes less than 2 seconds  Neurological:      General: No focal deficit present  Mental Status: He is alert and oriented to person, place, and time     Psychiatric:         Mood and Affect: Mood normal          Behavior: Behavior normal           Additional Data:     Labs:  Results from last 7 days   Lab Units 05/27/23  0441   EOS PCT % 3   HEMATOCRIT % 32 7*   HEMOGLOBIN g/dL 10 5*   LYMPHS PCT % 24   MONOS PCT % 17*   NEUTROS PCT % 56   PLATELETS Thousands/uL 214   WBC Thousand/uL 5 40     Results from last 7 days   Lab Units 05/27/23  0441 05/26/23  0445   ANION GAP mmol/L 4 7   ALBUMIN g/dL  --  2 9*   ALK PHOS U/L  --  36   ALT U/L  --  4*   AST U/L  --  8*   BUN mg/dL 19 26*   CALCIUM mg/dL 7 7* 7 8*   CHLORIDE mmol/L 108 108   CO2 mmol/L 26 25   CREATININE mg/dL 0 94 1 15   GLUCOSE RANDOM mg/dL 110 72   POTASSIUM mmol/L 4 0 4 1   SODIUM mmol/L 138 140   TOTAL BILIRUBIN mg/dL  --  0 39     Results from last 7 days   Lab Units 05/24/23  1238   INR  1 12             Results from last 7 days   Lab Units 05/25/23  0443 05/24/23  1238   LACTIC ACID mmol/L  --  1 4   PROCALCITONIN ng/ml 0 18 0 06       Lines/Drains:  Invasive Devices Peripheral Intravenous Line  Duration           Peripheral IV 05/24/23 Distal;Right;Upper;Ventral (anterior) Arm 3 days                      Imaging: No pertinent imaging reviewed  Recent Cultures (last 7 days):   Results from last 7 days   Lab Units 05/25/23  1323 05/24/23  1238   BLOOD CULTURE   --  No Growth at 72 hrs  No Growth at 72 hrs  BODY FLUID CULTURE, STERILE  No growth  --    GRAM STAIN RESULT  2+ Polys  No bacteria seen  --        Last 24 Hours Medication List:   Current Facility-Administered Medications   Medication Dose Route Frequency Provider Last Rate   • allopurinol  100 mg Oral Daily Grady Moore MD     • colchicine  0 6 mg Oral Daily Grady Moore MD     • divalproex sodium  500 mg Oral Q12H [address] MD Idris     • escitalopram  20 mg Oral Daily Grady Moore MD     • losartan  100 mg Oral Daily Grady Moore MD     • metoprolol  2 5 mg Intravenous Q6H PRN Melissa Moore MD     • montelukast  10 mg Oral Daily Grady Moore MD     • pantoprazole  40 mg Intravenous Q24H [address] Grady Moore MD     • risperiDONE  1 5 mg Oral BID Grady Moore MD     • tamsulosin  0 8 mg Oral Daily With Dinner Grady Moore MD          Today, Patient Was Seen By: Flynn Aparicio MD    **Please Note: This note may have been constructed using a voice recognition system  **

## 2023-05-28 NOTE — ASSESSMENT & PLAN NOTE
· Per discussion with patient's RN  Patient had an ED visit at [facility] in August 2022 for a rule out ACS  CT chest obtained at that time had no lesions or abnormalities  · Has subsequently developed right-sided pleural effusions  He has had 3 thoracenteses all draining greater than 1500 cc with negative cytologies  RN unsure whether transudative or exudative  · Patient does follow with pulmonology at [facility]  · S/p IR thoracentesis 5/25 with 1800 mL of clear yellow output  · Exudative effusion, per Lights criteria  · 1559 WBCs, lymphocyte predominant 82%  pH 7 7  Glucose 91  No bacteria on Gram stain    Culture pending    Plan:  Monitor respiratory status  Patient should follow-up with pulmonology as an outpatient

## 2023-05-28 NOTE — ASSESSMENT & PLAN NOTE
· Newly diagnosed via sleep study  · Not currently using CPAP as outpatient  · Patient agreeable to CPAP while in hospital

## 2023-05-28 NOTE — QUICK NOTE
Pt's IV due to be changed today  RN (Britta Flores) notified SLIM  SLIM (Grady [employer] Son Aciu) ok'd nursing staff to leave IV out  RN Darrius Blackman) removed pt's IV

## 2023-05-28 NOTE — QUICK NOTE
Called , Antoinette Ayala  No answer  Left message  There is a meeting scheduled tomorrow at 1:30 PM with Antoinette Ayala, our case management staff, and our clinical staff

## 2023-05-28 NOTE — ASSESSMENT & PLAN NOTE
Evidence of esophagitis on CT  No noted history    Plan:  PPI  Patient does have appointment with outpatient GI for issue with chronic diarrhea, can follow-up about this esophagitis at that time

## 2023-05-29 LAB
ATRIAL RATE: 87 BPM
BACTERIA BLD CULT: NORMAL
BACTERIA BLD CULT: NORMAL
P AXIS: 30 DEGREES
PR INTERVAL: 126 MS
QRS AXIS: 83 DEGREES
QRSD INTERVAL: 92 MS
QT INTERVAL: 376 MS
QTC INTERVAL: 452 MS
T WAVE AXIS: 33 DEGREES
VENTRICULAR RATE: 87 BPM

## 2023-05-29 RX ORDER — PANTOPRAZOLE SODIUM 40 MG/1
40 TABLET, DELAYED RELEASE ORAL
Qty: 28 TABLET | Refills: 0 | Status: SHIPPED | OUTPATIENT
Start: 2023-05-30 | End: 2023-05-29 | Stop reason: SDUPTHER

## 2023-05-29 RX ORDER — FUROSEMIDE 20 MG/1
20 TABLET ORAL DAILY
Status: DISCONTINUED | OUTPATIENT
Start: 2023-05-29 | End: 2023-05-29

## 2023-05-29 RX ORDER — IBUPROFEN 400 MG/1
400 TABLET ORAL EVERY 6 HOURS PRN
Qty: 30 TABLET | Refills: 0 | Status: SHIPPED | OUTPATIENT
Start: 2023-05-29 | End: 2023-05-29 | Stop reason: SDUPTHER

## 2023-05-29 RX ORDER — IBUPROFEN 400 MG/1
400 TABLET ORAL EVERY 6 HOURS PRN
Qty: 30 TABLET | Refills: 0 | Status: SHIPPED | OUTPATIENT
Start: 2023-05-29 | End: 2023-05-30

## 2023-05-29 RX ORDER — SENNOSIDES 8.6 MG
17.2 TABLET ORAL
Qty: 60 TABLET | Refills: 0 | Status: SHIPPED | OUTPATIENT
Start: 2023-05-29 | End: 2023-06-28

## 2023-05-29 RX ORDER — PANTOPRAZOLE SODIUM 40 MG/1
40 TABLET, DELAYED RELEASE ORAL
Qty: 28 TABLET | Refills: 0 | Status: SHIPPED | OUTPATIENT
Start: 2023-05-30 | End: 2023-06-27

## 2023-05-29 RX ORDER — SENNOSIDES 8.6 MG
17.2 TABLET ORAL
Qty: 60 TABLET | Refills: 0 | Status: SHIPPED | OUTPATIENT
Start: 2023-05-29 | End: 2023-05-29 | Stop reason: SDUPTHER

## 2023-05-29 RX ADMIN — DOCUSATE SODIUM 100 MG: 100 CAPSULE, LIQUID FILLED ORAL at 09:37

## 2023-05-29 RX ADMIN — DIVALPROEX SODIUM 500 MG: 500 TABLET, DELAYED RELEASE ORAL at 09:37

## 2023-05-29 RX ADMIN — LOSARTAN POTASSIUM 100 MG: 50 TABLET, FILM COATED ORAL at 09:36

## 2023-05-29 RX ADMIN — ALLOPURINOL 100 MG: 100 TABLET ORAL at 09:37

## 2023-05-29 RX ADMIN — DIVALPROEX SODIUM 500 MG: 500 TABLET, DELAYED RELEASE ORAL at 21:35

## 2023-05-29 RX ADMIN — ESCITALOPRAM OXALATE 20 MG: 20 TABLET ORAL at 09:36

## 2023-05-29 RX ADMIN — PANTOPRAZOLE SODIUM 40 MG: 40 TABLET, DELAYED RELEASE ORAL at 05:46

## 2023-05-29 RX ADMIN — RISPERIDONE 1.5 MG: 0.5 TABLET, ORALLY DISINTEGRATING ORAL at 17:37

## 2023-05-29 RX ADMIN — COLCHICINE 0.6 MG: 0.6 TABLET ORAL at 09:36

## 2023-05-29 RX ADMIN — RISPERIDONE 1.5 MG: 0.5 TABLET, ORALLY DISINTEGRATING ORAL at 09:37

## 2023-05-29 RX ADMIN — MONTELUKAST 10 MG: 10 TABLET, FILM COATED ORAL at 09:37

## 2023-05-29 RX ADMIN — TAMSULOSIN HYDROCHLORIDE 0.8 MG: 0.4 CAPSULE ORAL at 17:37

## 2023-05-29 NOTE — PLAN OF CARE
Problem: MOBILITY - ADULT  Goal: Maintain or return to baseline ADL function  Description: INTERVENTIONS:  -  Assess patient's ability to carry out ADLs; assess patient's baseline for ADL function and identify physical deficits which impact ability to perform ADLs (bathing, care of mouth/teeth, toileting, grooming, dressing, etc )  - Assess/evaluate cause of self-care deficits   - Assess range of motion  - Assess patient's mobility; develop plan if impaired  - Assess patient's need for assistive devices and provide as appropriate  - Encourage maximum independence but intervene and supervise when necessary  - Involve family in performance of ADLs  - Assess for home care needs following discharge   - Consider OT consult to assist with ADL evaluation and planning for discharge  - Provide patient education as appropriate  Outcome: Progressing  Goal: Maintains/Returns to pre admission functional level  Description: INTERVENTIONS:  - Perform BMAT or MOVE assessment daily    - Set and communicate daily mobility goal to care team and patient/family/caregiver     - Collaborate with rehabilitation services on mobility goals if consulted  - Dangle patient 3 times a day  - Stand patient 6 times a day  - Ambulate patient 4 times a day  - Out of bed to chair 3 times a day   - Out of bed for meals 3 times a day  - Out of bed for toileting  - Record patient progress and toleration of activity level   Outcome: Progressing     Problem: Prexisting or High Potential for Compromised Skin Integrity  Goal: Skin integrity is maintained or improved  Description: INTERVENTIONS:  - Identify patients at risk for skin breakdown  - Assess and monitor skin integrity  - Assess and monitor nutrition and hydration status  - Monitor labs   - Assess for incontinence   - Turn and reposition patient  - Assist with mobility/ambulation  - Relieve pressure over bony prominences  - Avoid friction and shearing  - Provide appropriate hygiene as needed including keeping skin clean and dry  - Evaluate need for skin moisturizer/barrier cream  - Collaborate with interdisciplinary team   - Patient/family teaching  - Consider wound care consult   Outcome: Progressing     Problem: PAIN - ADULT  Goal: Verbalizes/displays adequate comfort level or baseline comfort level  Description: Interventions:  - Encourage patient to monitor pain and request assistance  - Assess pain using appropriate pain scale  - Administer analgesics based on type and severity of pain and evaluate response  - Implement non-pharmacological measures as appropriate and evaluate response  - Consider cultural and social influences on pain and pain management  - Notify physician/advanced practitioner if interventions unsuccessful or patient reports new pain  Outcome: Progressing     Problem: SAFETY ADULT  Goal: Maintain or return to baseline ADL function  Description: INTERVENTIONS:  -  Assess patient's ability to carry out ADLs; assess patient's baseline for ADL function and identify physical deficits which impact ability to perform ADLs (bathing, care of mouth/teeth, toileting, grooming, dressing, etc )  - Assess/evaluate cause of self-care deficits   - Assess range of motion  - Assess patient's mobility; develop plan if impaired  - Assess patient's need for assistive devices and provide as appropriate  - Encourage maximum independence but intervene and supervise when necessary  - Involve family in performance of ADLs  - Assess for home care needs following discharge   - Consider OT consult to assist with ADL evaluation and planning for discharge  - Provide patient education as appropriate  Outcome: Progressing  Goal: Maintains/Returns to pre admission functional level  Description: INTERVENTIONS:  - Perform BMAT or MOVE assessment daily    - Set and communicate daily mobility goal to care team and patient/family/caregiver     - Collaborate with rehabilitation services on mobility goals if consulted  - Dangle patient 3 times a day  - Stand patient 6 times a day  - Ambulate patient 4 times a day  - Out of bed to chair 3 times a day   - Out of bed for meals 3 times a day  - Out of bed for toileting  - Record patient progress and toleration of activity level   Outcome: Progressing  Goal: Patient will remain free of falls  Description: INTERVENTIONS:  - Educate patient/family on patient safety including physical limitations  - Instruct patient to call for assistance with activity   - Consult OT/PT to assist with strengthening/mobility   - Keep Call bell within reach  - Keep bed low and locked with side rails adjusted as appropriate  - Keep care items and personal belongings within reach  - Initiate and maintain comfort rounds  - Make Fall Risk Sign visible to staff  - Offer Toileting every 2 Hours, in advance of need  - Initiate/Maintain alarm  - Obtain necessary fall risk management equipment  - Apply yellow socks and bracelet for high fall risk patients  - Consider moving patient to room near nurses station  Outcome: Progressing     Problem: DISCHARGE PLANNING  Goal: Discharge to home or other facility with appropriate resources  Description: INTERVENTIONS:  - Identify barriers to discharge w/patient and caregiver  - Arrange for needed discharge resources and transportation as appropriate  - Identify discharge learning needs (meds, wound care, etc )  - Arrange for interpretive services to assist at discharge as needed  - Refer to Case Management Department for coordinating discharge planning if the patient needs post-hospital services based on physician/advanced practitioner order or complex needs related to functional status, cognitive ability, or social support system  Outcome: Progressing     Problem: Knowledge Deficit  Goal: Patient/family/caregiver demonstrates understanding of disease process, treatment plan, medications, and discharge instructions  Description: Complete learning assessment and assess knowledge base  Interventions:  - Provide teaching at level of understanding  - Provide teaching via preferred learning methods  Outcome: Progressing     Problem: RESPIRATORY - ADULT  Goal: Achieves optimal ventilation and oxygenation  Description: INTERVENTIONS:  - Assess for changes in respiratory status  - Assess for changes in mentation and behavior  - Position to facilitate oxygenation and minimize respiratory effort  - Oxygen administered by appropriate delivery if ordered  - Initiate smoking cessation education as indicated  - Encourage broncho-pulmonary hygiene including cough, deep breathe, Incentive Spirometry  - Assess the need for suctioning and aspirate as needed  - Assess and instruct to report SOB or any respiratory difficulty  - Respiratory Therapy support as indicated  Outcome: Progressing     Problem: Nutrition/Hydration-ADULT  Goal: Nutrient/Hydration intake appropriate for improving, restoring or maintaining nutritional needs  Description: Monitor and assess patient's nutrition/hydration status for malnutrition  Collaborate with interdisciplinary team and initiate plan and interventions as ordered  Monitor patient's weight and dietary intake as ordered or per policy  Utilize nutrition screening tool and intervene as necessary  Determine patient's food preferences and provide high-protein, high-caloric foods as appropriate       INTERVENTIONS:  - Monitor oral intake, urinary output, labs, and treatment plans  - Assess nutrition and hydration status and recommend course of action  - Evaluate amount of meals eaten  - Assist patient with eating if necessary   - Allow adequate time for meals  - Recommend/ encourage appropriate diets, oral nutritional supplements, and vitamin/mineral supplements  - Order, calculate, and assess calorie counts as needed  - Recommend, monitor, and adjust tube feedings and TPN/PPN based on assessed needs  - Assess need for intravenous fluids  - Provide specific nutrition/hydration education as appropriate  - Include patient/family/caregiver in decisions related to nutrition  Outcome: Progressing

## 2023-05-29 NOTE — PROGRESS NOTES
[facility]  Progress Note  Name: Radhika Ridley  MRN: [id number]  Unit/Bed#: W -01 I Date of Admission: 5/24/2023   Date of Service: 5/29/2023 I Hospital Day: 5    Assessment/Plan   * Small bowel obstruction, partial ([address] )  Assessment & Plan  · CTAP in ED concerning for small bowel obstruction  · Patient had bowel movements day of admission and is still passing gas  · Evaluated by surgery, felt to be due to gastroenteritis vs full bowel obstruction  · No prior abdominal surgeries  · Repeat CTAP with oral contrast 5/25 without evidence of small bowel obstruction, as contrast was noted in the colon  · Patient denies any abdominal pain  Is passing gas  Plan  · NG tube discontinued  · Monitor    Aspiration pneumonitis [facility])  Assessment & Plan  · Caregiver reports patient eats very rapidly and often coughs/chokes during eating  · Rhonchorous breath sounds in the emergency department  · Received dose of antibiotics in the emergency department  · Leukocytosis resolved  procalcitonin WNL  Afebrile  Plan:  · Supplemental O2 as needed for O2 saturation greater than 94%  · Hold further antibiotics for now  · DuoNebs every 6 hours  · Evaluated by speech therapy and recommended soft diet with thin liquids  Patient tolerating this diet well  Recurrent pleural effusion on right  Assessment & Plan  · Per discussion with patient's RN  Patient had an ED visit at [facility] in August 2022 for a rule out ACS  CT chest obtained at that time had no lesions or abnormalities  · Has subsequently developed right-sided pleural effusions  He has had 3 thoracenteses all draining greater than 1500 cc with negative cytologies  RN unsure whether transudative or exudative  · Patient does follow with pulmonology at [facility]  · S/p IR thoracentesis 5/25 with 1800 mL of clear yellow output  · Exudative effusion, per Lights criteria    · 1559 WBCs, lymphocyte predominant 82%   pH 7 7  Glucose 91  No bacteria on Gram stain  Culture pending    Plan:  Monitor respiratory status  Patient should follow-up with pulmonology as an outpatient    Acute anemia  Assessment & Plan  · Hemoglobin dropped from 14 2 on admission to 10 8 this morning  · No definite bleeding source  No hematochezia/melena, hematuria, hemoptysis, hematemesis, hemolysis  · Possible delayed drop? NGT output was initially dark presentation, clear the following morning  · Vital signs stable  Plan  · Hemoglobin stable, continue to monitor with daily CBC  · Monitor for any signs of bleeding  · Hold DVT prophylaxis, ambulate patient  · Monitor vitals closely    Pancreatic abnormality  Assessment & Plan  · Incidentally noted pancreatic tail nodule  · Non-urgent MRI recommended    Esophagitis  Assessment & Plan  Evidence of esophagitis on CT  No noted history    Plan:  PPI  Patient does have appointment with outpatient GI for issue with chronic diarrhea, can follow-up about this esophagitis at that time    LUPIS (obstructive sleep apnea)  Assessment & Plan  · Newly diagnosed via sleep study  · Not currently using CPAP as outpatient  · Patient agreeable to CPAP while in hospital    Mild persistent asthma without complication  Assessment & Plan  · Home Singulair  · Continue DuoNebs for aspiration pneumonitis    Essential hypertension  Assessment & Plan  Resume cozaar  Can add as needed medication if becomes hypertensive    Bipolar disorder ([facility] [address] )  Assessment & Plan  · Risperidone + Depakote             VTE Pharmacologic Prophylaxis: VTE Score: 5 High Risk (Score >/= 5) - Pharmacological DVT Prophylaxis Contraindicated  Sequential Compression Devices Ordered  Decreased hemoglobin levels    Patient Centered Rounds: I performed bedside rounds with nursing staff today  Discussions with Specialists or Other Care Team Provider:     Education and Discussions with Family / Patient: meeting with facility later today       Current Length of Stay: 5 day(s)  Current Patient Status: Inpatient   Discharge Plan: Anticipate discharge later today or tomorrow to prior assisted or independent living facility  Code Status: Level 1 - Full Code    Subjective:   Patient resting in bed  Says he has some epigastric discomfort and nausea but otherwise no real complaints  Said he had a BM last night  Objective:     Vitals:   Temp (24hrs), Av 4 °F (36 9 °C), Min:97 6 °F (36 4 °C), Max:98 9 °F (37 2 °C)    Temp:  [97 6 °F (36 4 °C)-98 9 °F (37 2 °C)] 97 6 °F (36 4 °C)  HR:  [65-71] 68  Resp:  [18-20] 18  BP: (136-152)/(64-79) 152/79  SpO2:  [94 %-98 %] 95 %  Body mass index is 42 52 kg/m²  Input and Output Summary (last 24 hours): Intake/Output Summary (Last 24 hours) at 2023 0914  Last data filed at 2023 1711  Gross per 24 hour   Intake 2280 ml   Output 425 ml   Net 1855 ml       Physical Exam:   Physical Exam  Constitutional:       General: He is not in acute distress  Appearance: He is obese  He is not ill-appearing  HENT:      Mouth/Throat:      Mouth: Mucous membranes are moist       Pharynx: Oropharynx is clear  Cardiovascular:      Rate and Rhythm: Normal rate and regular rhythm  Heart sounds: No murmur heard  Pulmonary:      Effort: Pulmonary effort is normal  No respiratory distress  Breath sounds: Normal breath sounds  Abdominal:      General: Abdomen is flat  Bowel sounds are normal  There is no distension  Palpations: Abdomen is soft  Tenderness: There is no abdominal tenderness  There is no guarding  Musculoskeletal:      Right lower leg: No edema  Left lower leg: No edema  Skin:     General: Skin is warm and dry  Neurological:      Mental Status: He is alert  Mental status is at baseline     Psychiatric:         Mood and Affect: Mood normal           Additional Data:     Labs:  Results from last 7 days   Lab Units 23  0441   EOS PCT % 3   HEMATOCRIT % 32 7*   HEMOGLOBIN g/dL 10 5*   LYMPHS PCT % 24   MONOS PCT % 17*   NEUTROS PCT % 56   PLATELETS Thousands/uL 214   WBC Thousand/uL 5 40     Results from last 7 days   Lab Units 05/27/23  0441 05/26/23  0445   ANION GAP mmol/L 4 7   ALBUMIN g/dL  --  2 9*   ALK PHOS U/L  --  36   ALT U/L  --  4*   AST U/L  --  8*   BUN mg/dL 19 26*   CALCIUM mg/dL 7 7* 7 8*   CHLORIDE mmol/L 108 108   CO2 mmol/L 26 25   CREATININE mg/dL 0 94 1 15   GLUCOSE RANDOM mg/dL 110 72   POTASSIUM mmol/L 4 0 4 1   SODIUM mmol/L 138 140   TOTAL BILIRUBIN mg/dL  --  0 39     Results from last 7 days   Lab Units 05/24/23  1238   INR  1 12             Results from last 7 days   Lab Units 05/25/23  0443 05/24/23  1238   LACTIC ACID mmol/L  --  1 4   PROCALCITONIN ng/ml 0 18 0 06       Lines/Drains:  Invasive Devices     None                       Imaging: No pertinent imaging reviewed  Recent Cultures (last 7 days):   Results from last 7 days   Lab Units 05/25/23  1323 05/24/23  1238   BLOOD CULTURE   --  No Growth After 4 Days  No Growth After 4 Days     BODY FLUID CULTURE, STERILE  No growth  --    GRAM STAIN RESULT  2+ Polys  No bacteria seen  --        Last 24 Hours Medication List:   Current Facility-Administered Medications   Medication Dose Route Frequency Provider Last Rate   • allopurinol  100 mg Oral Daily Grady Connelly MD     • colchicine  0 6 mg Oral Daily Grady Connelly MD     • divalproex sodium  500 mg Oral Q12H [address] AMANDA Steinberg MD     • docusate sodium  100 mg Oral BID Samia Daley MD     • escitalopram  20 mg Oral Daily Grady Connelly MD     • losartan  100 mg Oral Daily Grady Connelly MD     • montelukast  10 mg Oral Daily Grady Connelly MD     • pantoprazole  40 mg Oral Early Morning Grady Connelly MD     • risperiDONE  1 5 mg Oral BID Grady Connelly MD     • senna  2 tablet Oral HS Samia Daley MD     • tamsulosin  0 8 mg Oral Daily With Dinner Grady Halley Wright MD          Today, Patient Was Seen By: Duncan Parada DO    **Please Note: This note may have been constructed using a voice recognition system  **

## 2023-05-29 NOTE — ASSESSMENT & PLAN NOTE
· Per discussion with patient's RN  Patient had an ED visit at [facility] in August 2022 for a rule out ACS  CT chest obtained at that time had no lesions or abnormalities  · Has subsequently developed right-sided pleural effusions  He has had 3 thoracenteses all draining greater than 1500 cc with negative cytologies  RN unsure whether transudative or exudative  · Patient does follow with pulmonology at [facility]  · S/p IR thoracentesis 5/25 with 1800 mL of clear yellow output  · Exudative effusion, per Lights criteria  · 1559 WBCs, lymphocyte predominant 82%  pH 7 7  Glucose 91  No bacteria on Gram stain    Culture pending    Plan:  Monitor respiratory status  Patient should follow-up with pulmonology as an outpatient

## 2023-05-29 NOTE — CASE MANAGEMENT
Case Management Progress Note    Patient name Nikki Jones  Location W /W -01 MRN [id number]  : 1965 Date 2023       LOS (days): 5  Geometric Mean LOS (GMLOS) (days): 4 30  Days to GMLOS:-0 5        OBJECTIVE:        Current admission status: Inpatient  Preferred Pharmacy:   [address], [address] [address]  Phone: [phone] Fax: [address], [address] [facility] [address]  Phone: [phone] Fax: [phone]    Primary Care Provider: Leno Turner DO    Primary Insurance: MEDICARE  Secondary Insurance: [place] [employer]    PROGRESS NOTE:    Care team meeting occurred via TEAMS with Dr Kevin Correa, [facility] Vivek Melchor, Marcel Duarte and Emperatriz Flores from [facility] team     All in agreement with patient for D/C tomorrow  Emperatriz Flores requesting all meds e-scribed to [facility] in [place]  Emperatriz Flores requesting AVS/DCI faxed to them [facility] @ [phone]  -ACT team confirmed they will be able to transport patient back to facility with an ETA of 1400 tomorrow (2023)

## 2023-05-29 NOTE — PLAN OF CARE
Problem: MOBILITY - ADULT  Goal: Maintain or return to baseline ADL function  Description: INTERVENTIONS:  -  Assess patient's ability to carry out ADLs; assess patient's baseline for ADL function and identify physical deficits which impact ability to perform ADLs (bathing, care of mouth/teeth, toileting, grooming, dressing, etc )  - Assess/evaluate cause of self-care deficits   - Assess range of motion  - Assess patient's mobility; develop plan if impaired  - Assess patient's need for assistive devices and provide as appropriate  - Encourage maximum independence but intervene and supervise when necessary  - Involve family in performance of ADLs  - Assess for home care needs following discharge   - Consider OT consult to assist with ADL evaluation and planning for discharge  - Provide patient education as appropriate  Outcome: Progressing  Goal: Maintains/Returns to pre admission functional level  Description: INTERVENTIONS:  - Perform BMAT or MOVE assessment daily    - Set and communicate daily mobility goal to care team and patient/family/caregiver     - Collaborate with rehabilitation services on mobility goals if consulted  - Dangle patient 3 times a day  - Stand patient 6 times a day  - Ambulate patient 4 times a day  - Out of bed to chair 3 times a day   - Out of bed for meals 3 times a day  - Out of bed for toileting  - Record patient progress and toleration of activity level   Outcome: Progressing     Problem: Prexisting or High Potential for Compromised Skin Integrity  Goal: Skin integrity is maintained or improved  Description: INTERVENTIONS:  - Identify patients at risk for skin breakdown  - Assess and monitor skin integrity  - Assess and monitor nutrition and hydration status  - Monitor labs   - Assess for incontinence   - Turn and reposition patient  - Assist with mobility/ambulation  - Relieve pressure over bony prominences  - Avoid friction and shearing  - Provide appropriate hygiene as needed including keeping skin clean and dry  - Evaluate need for skin moisturizer/barrier cream  - Collaborate with interdisciplinary team   - Patient/family teaching  - Consider wound care consult   Outcome: Progressing     Problem: PAIN - ADULT  Goal: Verbalizes/displays adequate comfort level or baseline comfort level  Description: Interventions:  - Encourage patient to monitor pain and request assistance  - Assess pain using appropriate pain scale  - Administer analgesics based on type and severity of pain and evaluate response  - Implement non-pharmacological measures as appropriate and evaluate response  - Consider cultural and social influences on pain and pain management  - Notify physician/advanced practitioner if interventions unsuccessful or patient reports new pain  Outcome: Progressing     Problem: SAFETY ADULT  Goal: Maintain or return to baseline ADL function  Description: INTERVENTIONS:  -  Assess patient's ability to carry out ADLs; assess patient's baseline for ADL function and identify physical deficits which impact ability to perform ADLs (bathing, care of mouth/teeth, toileting, grooming, dressing, etc )  - Assess/evaluate cause of self-care deficits   - Assess range of motion  - Assess patient's mobility; develop plan if impaired  - Assess patient's need for assistive devices and provide as appropriate  - Encourage maximum independence but intervene and supervise when necessary  - Involve family in performance of ADLs  - Assess for home care needs following discharge   - Consider OT consult to assist with ADL evaluation and planning for discharge  - Provide patient education as appropriate  Outcome: Progressing  Goal: Maintains/Returns to pre admission functional level  Description: INTERVENTIONS:  - Perform BMAT or MOVE assessment daily    - Set and communicate daily mobility goal to care team and patient/family/caregiver     - Collaborate with rehabilitation services on mobility goals if consulted  - Dangle patient 3 times a day  - Stand patient 6 times a day  - Ambulate patient 4 times a day  - Out of bed to chair 3 times a day   - Out of bed for meals 3 times a day  - Out of bed for toileting  - Record patient progress and toleration of activity level   Outcome: Progressing  Goal: Patient will remain free of falls  Description: INTERVENTIONS:  - Educate patient/family on patient safety including physical limitations  - Instruct patient to call for assistance with activity   - Consult OT/PT to assist with strengthening/mobility   - Keep Call bell within reach  - Keep bed low and locked with side rails adjusted as appropriate  - Keep care items and personal belongings within reach  - Initiate and maintain comfort rounds  - Make Fall Risk Sign visible to staff  - Offer Toileting every 3 Hours, in advance of need  - Initiate/Maintain alarm  - Obtain necessary fall risk management equipment:   - Apply yellow socks and bracelet for high fall risk patients  - Consider moving patient to room near nurses station  Outcome: Progressing     Problem: DISCHARGE PLANNING  Goal: Discharge to home or other facility with appropriate resources  Description: INTERVENTIONS:  - Identify barriers to discharge w/patient and caregiver  - Arrange for needed discharge resources and transportation as appropriate  - Identify discharge learning needs (meds, wound care, etc )  - Arrange for interpretive services to assist at discharge as needed  - Refer to Case Management Department for coordinating discharge planning if the patient needs post-hospital services based on physician/advanced practitioner order or complex needs related to functional status, cognitive ability, or social support system  Outcome: Progressing     Problem: Knowledge Deficit  Goal: Patient/family/caregiver demonstrates understanding of disease process, treatment plan, medications, and discharge instructions  Description: Complete learning assessment and assess knowledge base  Interventions:  - Provide teaching at level of understanding  - Provide teaching via preferred learning methods  Outcome: Progressing     Problem: RESPIRATORY - ADULT  Goal: Achieves optimal ventilation and oxygenation  Description: INTERVENTIONS:  - Assess for changes in respiratory status  - Assess for changes in mentation and behavior  - Position to facilitate oxygenation and minimize respiratory effort  - Oxygen administered by appropriate delivery if ordered  - Initiate smoking cessation education as indicated  - Encourage broncho-pulmonary hygiene including cough, deep breathe, Incentive Spirometry  - Assess the need for suctioning and aspirate as needed  - Assess and instruct to report SOB or any respiratory difficulty  - Respiratory Therapy support as indicated  Outcome: Progressing     Problem: Nutrition/Hydration-ADULT  Goal: Nutrient/Hydration intake appropriate for improving, restoring or maintaining nutritional needs  Description: Monitor and assess patient's nutrition/hydration status for malnutrition  Collaborate with interdisciplinary team and initiate plan and interventions as ordered  Monitor patient's weight and dietary intake as ordered or per policy  Utilize nutrition screening tool and intervene as necessary  Determine patient's food preferences and provide high-protein, high-caloric foods as appropriate       INTERVENTIONS:  - Monitor oral intake, urinary output, labs, and treatment plans  - Assess nutrition and hydration status and recommend course of action  - Evaluate amount of meals eaten  - Assist patient with eating if necessary   - Allow adequate time for meals  - Recommend/ encourage appropriate diets, oral nutritional supplements, and vitamin/mineral supplements  - Order, calculate, and assess calorie counts as needed  - Recommend, monitor, and adjust tube feedings and TPN/PPN based on assessed needs  - Assess need for intravenous fluids  - Provide specific nutrition/hydration education as appropriate  - Include patient/family/caregiver in decisions related to nutrition  Outcome: Progressing

## 2023-05-29 NOTE — ASSESSMENT & PLAN NOTE
· CTAP in ED concerning for small bowel obstruction  · Patient had bowel movements day of admission and is still passing gas  · Evaluated by surgery, felt to be due to gastroenteritis vs full bowel obstruction  · No prior abdominal surgeries  · Repeat CTAP with oral contrast 5/25 without evidence of small bowel obstruction, as contrast was noted in the colon  · Patient denies any abdominal pain  Is passing gas      Plan  · NG tube discontinued  · Monitor

## 2023-05-29 NOTE — CASE MANAGEMENT
Case Management Progress Note    Patient name Rachana Ring  Location W /W -01 MRN [id number]  : 1965 Date 2023       LOS (days): 5  Geometric Mean LOS (GMLOS) (days): 4 30  Days to GMLOS:-0 6        OBJECTIVE:        Current admission status: Inpatient  Preferred Pharmacy:   [address], [address] [place]  [address] [facility] [address] [address]  Phone: [phone] Fax: [address], [address] [place]  [facility] [address]  Phone: [phone] Fax: [phone]    Primary Care Provider: Bhaskar Dominique DO    Primary Insurance: MEDICARE  Secondary Insurance: [place] [employer]    PROGRESS NOTE:    AVS/DCI faxed to [phone]  [facility] confirmed D/C meds were e-scribed to [facility]  Plan for patient to D/C tomorrow @ 1400

## 2023-05-29 NOTE — DISCHARGE INSTR - AVS FIRST PAGE
Dear Harry Fox,     It was our pleasure to care for you here at [facility]  It is our hope that we were always able to exceed the expected standards for your care during your stay  You were hospitalized due to small bowel obstruction  You were cared for on the [address] [place] [address] 4th floor by Grisel Taylor DO under the service of Enrico Jain MD with the [facility] who covers for your primary care physician (PCP), Brendan Cuello DO, while you were hospitalized  If you have any questions or concerns related to this hospitalization, you may contact us at [phone]  For follow up as well as any medication refills, we recommend that you follow up with your primary care physician  A registered nurse will reach out to you by phone within a few days after your discharge to answer any additional questions that you may have after going home  However, at this time we provide for you here, the most important instructions / recommendations at discharge:     Notable Medication Adjustments -   Start protonix 40mg daily for 28 days  Start senokot 2 tablets at bedtime as needed for constipation  Please stop taking ibuprofen due to your esophagitis  Testing Required after Discharge -   Consider speech therapy evaluation as an outpatient  Follow up with pulmonology for recurrent right sided exudative pleural effusions  Important follow up information -   Please follow up with PCP within 2 weeks  Please follow up with gastroenterology in approximately 1 month for evaluation of your esophagitis     Please continue following as an outpatient with your pulmonologist   Other Instructions -   Please return to ED for worsening symptoms, unable to eat, uncontrolled vomiting or abdominal distention  Diet Recommendation: soft/pre-cut solids w/ added moisture and thin liquids   Aspiration Precautions: Small bites/sips, monitor for impulsivity, alternate solids and liquids  Please practice close monitoring and supervision during meals  Please review this entire after visit summary as additional general instructions including medication list, appointments, activity, diet, any pertinent wound care, and other additional recommendations from your care team that may be provided for you        Sincerely,     Grace Roberto, DO

## 2023-05-30 VITALS
RESPIRATION RATE: 16 BRPM | SYSTOLIC BLOOD PRESSURE: 154 MMHG | HEART RATE: 74 BPM | TEMPERATURE: 99.5 F | WEIGHT: 263.45 LBS | DIASTOLIC BLOOD PRESSURE: 82 MMHG | BODY MASS INDEX: 42.34 KG/M2 | OXYGEN SATURATION: 95 % | HEIGHT: 66 IN

## 2023-05-30 RX ADMIN — LOSARTAN POTASSIUM 100 MG: 50 TABLET, FILM COATED ORAL at 09:52

## 2023-05-30 RX ADMIN — ALLOPURINOL 100 MG: 100 TABLET ORAL at 09:52

## 2023-05-30 RX ADMIN — DIVALPROEX SODIUM 500 MG: 500 TABLET, DELAYED RELEASE ORAL at 09:52

## 2023-05-30 RX ADMIN — MONTELUKAST 10 MG: 10 TABLET, FILM COATED ORAL at 09:52

## 2023-05-30 RX ADMIN — ESCITALOPRAM OXALATE 20 MG: 20 TABLET ORAL at 09:52

## 2023-05-30 RX ADMIN — RISPERIDONE 1.5 MG: 0.5 TABLET, ORALLY DISINTEGRATING ORAL at 09:51

## 2023-05-30 RX ADMIN — PANTOPRAZOLE SODIUM 40 MG: 40 TABLET, DELAYED RELEASE ORAL at 05:04

## 2023-05-30 RX ADMIN — COLCHICINE 0.6 MG: 0.6 TABLET ORAL at 09:52

## 2023-05-30 NOTE — ASSESSMENT & PLAN NOTE
· Caregiver reports patient eats very rapidly and often coughs/chokes during eating  · Rhonchorous breath sounds in the emergency department  · Received dose of antibiotics in the emergency department  · Leukocytosis resolved  procalcitonin WNL  Afebrile  Plan:  · Supplemental O2 as needed for O2 saturation greater than 94%  · Hold further antibiotics for now  · DuoNebs every 6 hours while inpatient  · Evaluated by speech therapy and recommended soft diet with thin liquids  Patient tolerating this diet well

## 2023-05-30 NOTE — PLAN OF CARE
Problem: MOBILITY - ADULT  Goal: Maintain or return to baseline ADL function  Description: INTERVENTIONS:  -  Assess patient's ability to carry out ADLs; assess patient's baseline for ADL function and identify physical deficits which impact ability to perform ADLs (bathing, care of mouth/teeth, toileting, grooming, dressing, etc )  - Assess/evaluate cause of self-care deficits   - Assess range of motion  - Assess patient's mobility; develop plan if impaired  - Assess patient's need for assistive devices and provide as appropriate  - Encourage maximum independence but intervene and supervise when necessary  - Involve family in performance of ADLs  - Assess for home care needs following discharge   - Consider OT consult to assist with ADL evaluation and planning for discharge  - Provide patient education as appropriate  Outcome: Progressing  Goal: Maintains/Returns to pre admission functional level  Description: INTERVENTIONS:  - Perform BMAT or MOVE assessment daily    - Set and communicate daily mobility goal to care team and patient/family/caregiver     - Collaborate with rehabilitation services on mobility goals if consulted  - Dangle patient 3 times a day  - Stand patient 6 times a day  - Ambulate patient 4 times a day  - Out of bed to chair 3 times a day   - Out of bed for meals 3 times a day  - Out of bed for toileting  - Record patient progress and toleration of activity level   Outcome: Progressing     Problem: Prexisting or High Potential for Compromised Skin Integrity  Goal: Skin integrity is maintained or improved  Description: INTERVENTIONS:  - Identify patients at risk for skin breakdown  - Assess and monitor skin integrity  - Assess and monitor nutrition and hydration status  - Monitor labs   - Assess for incontinence   - Turn and reposition patient  - Assist with mobility/ambulation  - Relieve pressure over bony prominences  - Avoid friction and shearing  - Provide appropriate hygiene as needed including keeping skin clean and dry  - Evaluate need for skin moisturizer/barrier cream  - Collaborate with interdisciplinary team   - Patient/family teaching  - Consider wound care consult   Outcome: Progressing     Problem: PAIN - ADULT  Goal: Verbalizes/displays adequate comfort level or baseline comfort level  Description: Interventions:  - Encourage patient to monitor pain and request assistance  - Assess pain using appropriate pain scale  - Administer analgesics based on type and severity of pain and evaluate response  - Implement non-pharmacological measures as appropriate and evaluate response  - Consider cultural and social influences on pain and pain management  - Notify physician/advanced practitioner if interventions unsuccessful or patient reports new pain  Outcome: Progressing     Problem: SAFETY ADULT  Goal: Maintain or return to baseline ADL function  Description: INTERVENTIONS:  -  Assess patient's ability to carry out ADLs; assess patient's baseline for ADL function and identify physical deficits which impact ability to perform ADLs (bathing, care of mouth/teeth, toileting, grooming, dressing, etc )  - Assess/evaluate cause of self-care deficits   - Assess range of motion  - Assess patient's mobility; develop plan if impaired  - Assess patient's need for assistive devices and provide as appropriate  - Encourage maximum independence but intervene and supervise when necessary  - Involve family in performance of ADLs  - Assess for home care needs following discharge   - Consider OT consult to assist with ADL evaluation and planning for discharge  - Provide patient education as appropriate  Outcome: Progressing  Goal: Maintains/Returns to pre admission functional level  Description: INTERVENTIONS:  - Perform BMAT or MOVE assessment daily    - Set and communicate daily mobility goal to care team and patient/family/caregiver     - Collaborate with rehabilitation services on mobility goals if consulted  - Dangle patient 3 times a day  - Stand patient 6 times a day  - Ambulate patient 4 times a day  - Out of bed to chair 3 times a day   - Out of bed for meals 3 times a day  - Out of bed for toileting  - Record patient progress and toleration of activity level   Outcome: Progressing  Goal: Patient will remain free of falls  Description: INTERVENTIONS:  - Educate patient/family on patient safety including physical limitations  - Instruct patient to call for assistance with activity   - Consult OT/PT to assist with strengthening/mobility   - Keep Call bell within reach  - Keep bed low and locked with side rails adjusted as appropriate  - Keep care items and personal belongings within reach  - Initiate and maintain comfort rounds  - Make Fall Risk Sign visible to staff  - Offer Toileting every  Hours, in advance of need  - Initiate/Maintain alarm  - Obtain necessary fall risk management equipment:   - Apply yellow socks and bracelet for high fall risk patients  - Consider moving patient to room near nurses station  Outcome: Progressing

## 2023-05-30 NOTE — ASSESSMENT & PLAN NOTE
· Per discussion with patient's RN  Patient had an ED visit at [facility] in August 2022 for a rule out ACS  CT chest obtained at that time had no lesions or abnormalities  · Has subsequently developed right-sided pleural effusions  He has had 3 thoracenteses all draining greater than 1500 cc with negative cytologies  RN unsure whether transudative or exudative  · Patient does follow with pulmonology at [facility]  · S/p IR thoracentesis 5/25 with 1800 mL of clear yellow output  · Exudative effusion, per Lights criteria  · 1559 WBCs, lymphocyte predominant 82%  pH 7 7  Glucose 91  No bacteria on Gram stain    Culture pending    Plan:  Monitor respiratory status  Patient should follow-up with pulmonology as an outpatient

## 2023-05-30 NOTE — ASSESSMENT & PLAN NOTE
· CTAP in ED concerning for small bowel obstruction  · Patient had bowel movements day of admission and is still passing gas  · Evaluated by surgery, felt to be due to gastroenteritis, less likely small bowel obstruction  · No prior abdominal surgeries  · Repeat CTAP with oral contrast 5/25 without evidence of small bowel obstruction, as contrast was noted in the colon  · Patient denies any abdominal pain  Is passing gas      Plan  · Tolerating diet well  · Diet Recommendation: soft/pre-cut solids w/ added moisture and thin liquids

## 2023-05-30 NOTE — ASSESSMENT & PLAN NOTE
Evidence of esophagitis on CT  No noted history    Plan:  Continue PPI  Patient does have appointment with outpatient GI for issue with chronic diarrhea, can follow-up about this esophagitis at that time  Discontinued the patient's ibuprofen on discharge

## 2023-05-30 NOTE — ASSESSMENT & PLAN NOTE
· Newly diagnosed via sleep study  · Not currently using CPAP as outpatient  · Patient agreeable to CPAP while in hospital  · The patient stated that he refused this overnight 5/29-5/30

## 2023-05-30 NOTE — DISCHARGE SUMMARY
[facility]  Discharge- Willena Brittle 1965, 66 y o  male MRN: [id number]  Unit/Bed#: W -01 Encounter: [id number]  Primary Care Provider: Paramjit Perry DO   Date and time admitted to hospital: 5/24/2023 11:47 AM    * Small bowel obstruction, partial ([address] )  Assessment & Plan  · CTAP in ED concerning for small bowel obstruction  · Patient had bowel movements day of admission and is still passing gas  · Evaluated by surgery, felt to be due to gastroenteritis, less likely small bowel obstruction  · No prior abdominal surgeries  · Repeat CTAP with oral contrast 5/25 without evidence of small bowel obstruction, as contrast was noted in the colon  · Patient denies any abdominal pain  Is passing gas  Plan  · Tolerating diet well  · Diet Recommendation: soft/pre-cut solids w/ added moisture and thin liquids    Aspiration pneumonitis (HCC)  Assessment & Plan  · Caregiver reports patient eats very rapidly and often coughs/chokes during eating  · Rhonchorous breath sounds in the emergency department  · Received dose of antibiotics in the emergency department  · Leukocytosis resolved  procalcitonin WNL  Afebrile  Plan:  · Supplemental O2 as needed for O2 saturation greater than 94%  · Hold further antibiotics for now  · DuoNebs every 6 hours while inpatient  · Evaluated by speech therapy and recommended soft diet with thin liquids  Patient tolerating this diet well  Esophagitis  Assessment & Plan  Evidence of esophagitis on CT  No noted history    Plan:  Continue PPI  Patient does have appointment with outpatient GI for issue with chronic diarrhea, can follow-up about this esophagitis at that time  Discontinued the patient's ibuprofen on discharge  Recurrent pleural effusion on right  Assessment & Plan  · Per discussion with patient's RN  Patient had an ED visit at [facility] in August 2022 for a rule out ACS    CT chest obtained at that time had no lesions or abnormalities  · Has subsequently developed right-sided pleural effusions  He has had 3 thoracenteses all draining greater than 1500 cc with negative cytologies  RN unsure whether transudative or exudative  · Patient does follow with pulmonology at [facility]  · S/p IR thoracentesis 5/25 with 1800 mL of clear yellow output  · Exudative effusion, per Lights criteria  · 1559 WBCs, lymphocyte predominant 82%  pH 7 7  Glucose 91  No bacteria on Gram stain  Culture pending    Plan:  Monitor respiratory status  Patient should follow-up with pulmonology as an outpatient    Acute anemia  Assessment & Plan  Lab Results   Component Value Date    HGB 10 5 (L) 05/27/2023    HGB 11 1 (L) 05/26/2023    HGB 10 9 (L) 05/25/2023     · Hemoglobin dropped from 14 2 on admission to 10 8 this morning  · No definite bleeding source  No hematochezia/melena, hematuria, hemoptysis, hematemesis, hemolysis  · Vital signs stable  Plan    · Hemoglobin has stabilized      Pancreatic abnormality  Assessment & Plan  · Incidentally noted pancreatic tail nodule  · Non-urgent MRI recommended    LUPIS (obstructive sleep apnea)  Assessment & Plan  · Newly diagnosed via sleep study  · Not currently using CPAP as outpatient  · Patient agreeable to CPAP while in hospital  · The patient stated that he refused this overnight 5/29-5/30    Mild persistent asthma without complication  Assessment & Plan  · Home Singulair  · Continue DuoNebs for aspiration pneumonitis while inpatient    Essential hypertension  Assessment & Plan  Resume cozaar  Can add as needed medication if becomes hypertensive    Bipolar disorder ([facility] [address] )  Assessment & Plan  · Risperidone + Depakote    JENNA (acute kidney injury) (HCC)-resolved as of 5/28/2023  Assessment & Plan  · Creatinine jumped from 1 2 on admission to 2 today  · Back down to 1 15   · Resolved      Medical Problems     Resolved Problems  Date Reviewed: 5/29/2023          Resolved    JENNA (acute kidney injury) ([facility] [address] ) 5/28/2023     Resolved by  Dnais Harvey MD        Discharging Resident: Argelia Sullivan MD  Discharging Attending: Olaf Ramos MD  PCP: Agustin Grady DO  Admission Date:   Admission Orders (From admission, onward)     Ordered        05/24/23 1729  INPATIENT ADMISSION  Once                      Discharge Date: 05/30/23    Consultations During Hospital Stay:  IP CONSULT TO ACUTE CARE SURGERY     Procedures Performed:   · None    Significant Findings / Test Results:   Results Reviewed     Procedure Component Value Units Date/Time    Blood culture #1 [[id number]] Collected: 05/24/23 1238    Lab Status: Final result Specimen: Blood from Arm, Left Updated: 05/29/23 1601     Blood Culture No Growth After 5 Days  Blood culture #2 [[id number]] Collected: 05/24/23 1238    Lab Status: Final result Specimen: Blood from Arm, Right Updated: 05/29/23 1601     Blood Culture No Growth After 5 Days      UA w Reflex to Microscopic w Reflex to Culture [[id number]]  (Abnormal) Collected: 05/25/23 0650    Lab Status: Final result Specimen: Urine, Indwelling Martinez Catheter Updated: 05/25/23 0756     Color, UA Yellow     Clarity, UA Clear     Specific Gravity, UA >=1 050     pH, UA 5 5     Leukocytes, UA Negative     Nitrite, UA Negative     Protein, UA 50 (1+) mg/dl      Glucose, UA Negative mg/dl      Ketones, UA Negative mg/dl      Urobilinogen, UA <2 0 mg/dl      Bilirubin, UA Negative     Occult Blood, UA Negative    Lipase [[id number]]  (Abnormal) Collected: 05/24/23 1238    Lab Status: Final result Specimen: Blood from Arm, Right Updated: 05/24/23 1656     Lipase <6 u/L     HS Troponin I 2hr [[id number]]  (Normal) Collected: 05/24/23 1523    Lab Status: Final result Specimen: Blood from Arm, Right Updated: 05/24/23 1556     hs TnI 2hr 3 ng/L      Delta 2hr hsTnI 0 ng/L     FLU/RSV/COVID - if FLU/RSV clinically relevant [[id number]]  (Normal) Collected: 05/24/23 1238    Lab Status: Final result Specimen: Nares from Nose Updated: 05/24/23 1330     SARS-CoV-2 Negative     INFLUENZA A PCR Negative     INFLUENZA B PCR Negative     RSV PCR Negative    Narrative:      FOR PEDIATRIC PATIENTS - copy/paste COVID Guidelines URL to browser: [email or web address]    SARS-CoV-2 assay is a Nucleic Acid Amplification assay intended for the  qualitative detection of nucleic acid from SARS-CoV-2 in nasopharyngeal  swabs  Results are for the presumptive identification of SARS-CoV-2 RNA  Positive results are indicative of infection with SARS-CoV-2, the virus  causing COVID-19, but do not rule out bacterial infection or co-infection  with other viruses  Laboratories within the [place] and its  territories are required to report all positive results to the appropriate  public health authorities  Negative results do not preclude SARS-CoV-2  infection and should not be used as the sole basis for treatment or other  patient management decisions  Negative results must be combined with  clinical observations, patient history, and epidemiological information  This test has not been FDA cleared or approved  This test has been authorized by FDA under an Emergency Use Authorization  (EUA)  This test is only authorized for the duration of time the  declaration that circumstances exist justifying the authorization of the  emergency use of an in vitro diagnostic tests for detection of SARS-CoV-2  virus and/or diagnosis of COVID-19 infection under section 564(b)(1) of  the Act, 21 U  S C  359CAB-9(M)(1), unless the authorization is terminated  or revoked sooner  The test has been validated but independent review by FDA  and CLIA is pending  Test performed using [employer] GeneXpert: This RT-PCR assay targets N2,  a region unique to SARS-CoV-2  A conserved region in the E-gene was chosen  for pan-Sarbecovirus detection which includes SARS-CoV-2      According to CMS-2020-01-R, this platform meets the definition of high-throughput technology      Procalcitonin [[id number]]  (Normal) Collected: 05/24/23 1238    Lab Status: Final result Specimen: Blood from Arm, Right Updated: 05/24/23 1318     Procalcitonin 0 06 ng/ml     Strep A PCR [[id number]]  (Normal) Collected: 05/24/23 1238    Lab Status: Final result Specimen: Throat Updated: 05/24/23 1316     STREP A PCR Not Detected    HS Troponin 0hr (reflex protocol) [[id number]]  (Normal) Collected: 05/24/23 1238    Lab Status: Final result Specimen: Blood from Arm, Right Updated: 05/24/23 1315     hs TnI 0hr 3 ng/L     Protime-INR [[id number]]  (Abnormal) Collected: 05/24/23 1238    Lab Status: Final result Specimen: Blood from Arm, Right Updated: 05/24/23 1309     Protime 14 6 seconds      INR 1 12    APTT [[id number]]  (Normal) Collected: 05/24/23 1238    Lab Status: Final result Specimen: Blood from Arm, Right Updated: 05/24/23 1309     PTT 26 seconds     Comprehensive metabolic panel [[id number]]  (Abnormal) Collected: 05/24/23 1238    Lab Status: Final result Specimen: Blood from Arm, Right Updated: 05/24/23 1307     Sodium 138 mmol/L      Potassium 4 1 mmol/L      Chloride 100 mmol/L      CO2 30 mmol/L      ANION GAP 8 mmol/L      BUN 22 mg/dL      Creatinine 1 23 mg/dL      Glucose 168 mg/dL      Calcium 9 1 mg/dL      AST 10 U/L      ALT 9 U/L      Alkaline Phosphatase 55 U/L      Total Protein 7 3 g/dL      Albumin 3 8 g/dL      Total Bilirubin 0 53 mg/dL      eGFR 65 ml/min/1 73sq m     Narrative:      [place] guidelines for Chronic Kidney Disease (CKD):   •  Stage 1 with normal or high GFR (GFR > 90 mL/min/1 73 square meters)  •  Stage 2 Mild CKD (GFR = 60-89 mL/min/1 73 square meters)  •  Stage 3A Moderate CKD (GFR = 45-59 mL/min/1 73 square meters)  •  Stage 3B Moderate CKD (GFR = 30-44 mL/min/1 73 square meters)  •  Stage 4 Severe CKD (GFR = 15-29 mL/min/1 73 square meters)  •  Stage 5 End Stage CKD (GFR <15 mL/min/1 73 square meters)  Note: GFR calculation is accurate only with a steady state creatinine    Lactic acid [[id number]]  (Normal) Collected: 05/24/23 1238    Lab Status: Final result Specimen: Blood from Arm, Right Updated: 05/24/23 1306     LACTIC ACID 1 4 mmol/L     Narrative:      Result may be elevated if tourniquet was used during collection  CBC and differential [[id number]]  (Abnormal) Collected: 05/24/23 1238    Lab Status: Final result Specimen: Blood from Arm, Right Updated: 05/24/23 1252     WBC 10 27 Thousand/uL      RBC 4 54 Million/uL      Hemoglobin 14 2 g/dL      Hematocrit 43 5 %      MCV 96 fL      MCH 31 3 pg      MCHC 32 6 g/dL      RDW 13 7 %      MPV 9 8 fL      Platelets 336 Thousands/uL      nRBC 0 /100 WBCs      Neutrophils Relative 82 %      Immat GRANS % 0 %      Lymphocytes Relative 4 %      Monocytes Relative 14 %      Eosinophils Relative 0 %      Basophils Relative 0 %      Neutrophils Absolute 8 38 Thousands/µL      Immature Grans Absolute 0 03 Thousand/uL      Lymphocytes Absolute 0 40 Thousands/µL      Monocytes Absolute 1 45 Thousand/µL      Eosinophils Absolute 0 00 Thousand/µL      Basophils Absolute 0 01 Thousands/µL         IR IN-Patient Thoracentesis   Final Result by Mariana Goodman MD (05/25 1610)   Impression:   1  Successful ultrasound-guided thoracentesis yielding 1800 cc of right clear pleural fluid  Workstation performed: [id number]         CT abdomen pelvis wo contrast   Final Result by Raven Billings MD (05/25 4013)      No bowel obstruction; the oral contrast column has transited into the mid descending colon  Scattered areas of small bowel wall thickening in keeping with a nonspecific enteritis  Circumferential bladder wall thickening may indicate cystitis but is of a nonspecific degree  The study was marked in [facility] for immediate notification              Workstation performed: [id number]         XR chest portable   Final Result by Joi Pedersen MD (05/26 1305)      Enteric tube with tip in the region of the stomach, however the portion where the sideholes begin is in the distal esophagus  Recommend advancing at least 5 cm  Unchanged moderate right pleural effusion and veil-like opacities in the right lung  The study was marked in [facility] for immediate notification  Workstation performed: [id number]         XR chest 1 view portable   ED Interpretation by Garrison Mcmillan PA-C (05/24 3411)   NG tube in optimal position  Unchanged cardiopulmonary process from previous  Final Result by María Correa MD (05/25 9497)      Persistent right basilar opacity, likely due to a combination of atelectasis and moderate pleural effusion although pneumonic airspace consolidation is not excluded  Workstation performed: [id number]         CT abdomen pelvis with contrast   Final Result by Young Florez MD (05/24 4306)      1  Small bowel obstruction with transition point in the right lower quadrant  Etiology unclear in the absence of prior surgical history that would result in adhesions  Evaluation of the distal/terminal ileum is limited due to underdistention but    difficult to exclude some infectious or inflammatory wall thickening of the terminal ileum, which would raise the possibility of inflammatory bowel disease such as Crohn's disease with stricture  Obstruction due to occult small bowel mass is also in the    differential although no discrete mass is seen  2   Rounded noncalcified hyperattenuating structure in the pancreatic tail region measuring 1 5 x 1 6 cm which may be due to a tumor nodule (i e  neuroendocrine tumor), intrapancreatic splenule, or a noncalcified aneurysm although incompletely    characterized on this single phase study  Recommend nonemergent contrast-enhanced MR abdomen  3   Large right pleural effusion with near complete right lower lobe collapse  4   Diffuse esophageal wall thickening (i e  esophagitis)  Fluid within the distal esophagus can be due to reflux  Correlation with endoscopy can be considered  I personally discussed this study with Brian Roberto on 5/24/2023 4:25 PM                   Workstation performed: [id number]         XR chest 1 view portable   ED Interpretation by Adelaide Cloud PA-C (05/24 0283)   Right pleural effusion, with possible consolidation in right middle lobe  Final Result by Raiza Herrmann MD (05/24 0220)      Right basilar opacity with associated effusion, possibly pneumonia  This report is in agreement with the preliminary interpretation  Workstation performed: [id number]               Incidental Findings: Follow-up MRI for pancreatic cyst non-urgently is recommended  Test Results Pending at Discharge (will require follow up): · None     Outpatient Tests Requested: None      Complications:  None    Reason for Admission: Intractable nausea and vomiting, abdominal pain  Hospital Course: Chong Moreno is a 66 y o  male patient who originally presented to the hospital on 5/24/2023 due to abdominal pain, nausea and vomiting for the past 2 weeks  In the ED patient had an episode of bilious vomiting and 3 bowel movements also rhonchorous breath  On CT ches was noted pleural effusion which had required on and off thoracentesis in the past  CT abdomen and pelvis showed concern for SBO  Patient was placed NG tube and made NPO  Surgical team evaluated the patient and recommended a repeat CT with oral barium swallow which made the diagnosis of SBO less likely and prone toward gastroenteritis  Patient's symptoms improved with supportive care  Patient was also evaluated by speech therapy due to 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 and concern for aspiration  Was recommended soft/pre-cut solids w/ added moisture and thin liquids with aspiration precautions    On initial  CT exam was "also noted signs of esophagitis  Was initiated PPI  Was recommended to follow up with GI in the outpatient setting  Patient was also evaluated for his pleural effusion  A thoracentesis was done that revealed exudate according to lights criteria of unknown origin  Was recommended do follow up with the pulmonologist follows with dr Hema Gu[facility] at [facility]  For the concern for aspiration pneumonia patient received a dose of AB in the ED however was considered to be pneumonitis and was monitored off of antibiotics  Procal remained normal  Received DuoNeb treatments and recommended aspiration precautions and modified diet  Patient was back to his baseline and was stable for discharge back to his group home  Please see above list of diagnoses and related plan for additional information  Condition at Discharge: good    Discharge Day Visit / Exam:   Subjective: On the morning of 5/30, the patient reported no abdominal pain on palpation, though had subjective burning sensation in the epigastrium  He denied any fevers or chills, and states that he had a bowel movement yesterday and continues to pass gas today  He was tolerating diet well last night and continued to have an appetite this morning  He denies shortness of breath, nausea, or vomiting  Vitals: Blood Pressure: 154/82 (05/30/23 0757)  Pulse: 74 (05/30/23 0757)  Temperature: 99 5 °F (37 5 °C) (05/30/23 0757)  Temp Source: Oral (05/30/23 0757)  Respirations: 16 (05/30/23 0757)  Height: 5' 6\" (167 6 cm) (05/24/23 1852)  Weight - Scale: 120 kg (263 lb 7 2 oz) (05/24/23 1852)  SpO2: 95 % (05/30/23 0757)  Exam:   Physical Exam  Vitals and nursing note reviewed  Constitutional:       General: He is not in acute distress  Appearance: He is well-developed  He is obese  He is not toxic-appearing  HENT:      Head: Normocephalic and atraumatic  Eyes:      General: No scleral icterus       Conjunctiva/sclera: Conjunctivae normal    Cardiovascular:      Rate and " Rhythm: Normal rate and regular rhythm  Heart sounds: No murmur heard  Pulmonary:      Effort: Pulmonary effort is normal  No respiratory distress  Breath sounds: Normal breath sounds  Abdominal:      General: There is no distension  Palpations: Abdomen is soft  There is no mass  Tenderness: There is no abdominal tenderness  There is no guarding  Musculoskeletal:         General: No swelling  Cervical back: Neck supple  Right lower leg: No edema  Left lower leg: No edema  Skin:     General: Skin is warm and dry  Capillary Refill: Capillary refill takes less than 2 seconds  Neurological:      Mental Status: He is alert and oriented to person, place, and time  Psychiatric:         Mood and Affect: Mood normal          Behavior: Behavior normal           Discussion with Family: Updated  (Caregiver) via phone  Discharge instructions/Information to patient and family:   See after visit summary for information provided to patient and family  Provisions for Follow-Up Care:  See after visit summary for information related to follow-up care and any pertinent home health orders  Disposition:   Other: Group home    Planned Readmission: None    Discharge Medications:  See after visit summary for reconciled discharge medications provided to patient and/or family        **Please Note: This note may have been constructed using a voice recognition system**

## 2023-05-30 NOTE — ASSESSMENT & PLAN NOTE
Lab Results   Component Value Date    HGB 10 5 (L) 05/27/2023    HGB 11 1 (L) 05/26/2023    HGB 10 9 (L) 05/25/2023     · Hemoglobin dropped from 14 2 on admission to 10 8 this morning  · No definite bleeding source  No hematochezia/melena, hematuria, hemoptysis, hematemesis, hemolysis  · Vital signs stable  Plan    · Hemoglobin has stabilized

## 2023-06-03 ENCOUNTER — APPOINTMENT (EMERGENCY)
Dept: CT IMAGING | Facility: HOSPITAL | Age: 58
End: 2023-06-03
Payer: MEDICARE

## 2023-06-03 ENCOUNTER — HOSPITAL ENCOUNTER (EMERGENCY)
Facility: HOSPITAL | Age: 58
Discharge: HOME | End: 2023-06-03
Attending: EMERGENCY MEDICINE
Payer: MEDICARE

## 2023-06-03 VITALS
BODY MASS INDEX: 41.13 KG/M2 | DIASTOLIC BLOOD PRESSURE: 71 MMHG | OXYGEN SATURATION: 97 % | HEART RATE: 55 BPM | TEMPERATURE: 98.9 F | RESPIRATION RATE: 20 BRPM | WEIGHT: 254.85 LBS | SYSTOLIC BLOOD PRESSURE: 133 MMHG

## 2023-06-03 DIAGNOSIS — R07.9 CHEST PAIN: Primary | ICD-10-CM

## 2023-06-03 DIAGNOSIS — K20.90 ESOPHAGITIS: ICD-10-CM

## 2023-06-03 DIAGNOSIS — K86.9 PANCREATIC LESION: ICD-10-CM

## 2023-06-03 DIAGNOSIS — J90 PLEURAL EFFUSION: ICD-10-CM

## 2023-06-03 DIAGNOSIS — R10.9 ABDOMINAL PAIN: ICD-10-CM

## 2023-06-03 LAB
2HR DELTA HS TROPONIN: 0 NG/L
ALBUMIN SERPL BCP-MCNC: 3.1 G/DL (ref 3.5–5)
ALP SERPL-CCNC: 49 U/L (ref 34–104)
ALT SERPL W P-5'-P-CCNC: 7 U/L (ref 7–52)
ANION GAP SERPL CALCULATED.3IONS-SCNC: 4 MMOL/L (ref 4–13)
AST SERPL W P-5'-P-CCNC: 16 U/L (ref 13–39)
BASOPHILS # BLD AUTO: 0.04 THOUSANDS/ÂΜL (ref 0–0.1)
BASOPHILS NFR BLD AUTO: 1 % (ref 0–1)
BILIRUB SERPL-MCNC: 0.32 MG/DL (ref 0.2–1)
BUN SERPL-MCNC: 17 MG/DL (ref 5–25)
CALCIUM ALBUM COR SERPL-MCNC: 8.9 MG/DL (ref 8.3–10.1)
CALCIUM SERPL-MCNC: 8.2 MG/DL (ref 8.4–10.2)
CARDIAC TROPONIN I PNL SERPL HS: 3 NG/L
CARDIAC TROPONIN I PNL SERPL HS: 3 NG/L
CHLORIDE SERPL-SCNC: 105 MMOL/L (ref 96–108)
CO2 SERPL-SCNC: 27 MMOL/L (ref 21–32)
CREAT SERPL-MCNC: 0.89 MG/DL (ref 0.6–1.3)
EOSINOPHIL # BLD AUTO: 0.15 THOUSAND/ÂΜL (ref 0–0.61)
EOSINOPHIL NFR BLD AUTO: 3 % (ref 0–6)
ERYTHROCYTE [DISTWIDTH] IN BLOOD BY AUTOMATED COUNT: 13.3 % (ref 11.6–15.1)
GFR SERPL CREATININE-BSD FRML MDRD: 95 ML/MIN/1.73SQ M
GLUCOSE SERPL-MCNC: 100 MG/DL (ref 65–140)
HCT VFR BLD AUTO: 33.8 % (ref 36.5–49.3)
HGB BLD-MCNC: 10.5 G/DL (ref 12–17)
IMM GRANULOCYTES # BLD AUTO: 0.02 THOUSAND/UL (ref 0–0.2)
IMM GRANULOCYTES NFR BLD AUTO: 0 % (ref 0–2)
LIPASE SERPL-CCNC: 14 U/L (ref 11–82)
LYMPHOCYTES # BLD AUTO: 1.25 THOUSANDS/ÂΜL (ref 0.6–4.47)
LYMPHOCYTES NFR BLD AUTO: 27 % (ref 14–44)
MCH RBC QN AUTO: 30.4 PG (ref 26.8–34.3)
MCHC RBC AUTO-ENTMCNC: 31.1 G/DL (ref 31.4–37.4)
MCV RBC AUTO: 98 FL (ref 82–98)
MONOCYTES # BLD AUTO: 0.64 THOUSAND/ÂΜL (ref 0.17–1.22)
MONOCYTES NFR BLD AUTO: 14 % (ref 4–12)
NEUTROPHILS # BLD AUTO: 2.58 THOUSANDS/ÂΜL (ref 1.85–7.62)
NEUTS SEG NFR BLD AUTO: 55 % (ref 43–75)
NRBC BLD AUTO-RTO: 0 /100 WBCS
PLATELET # BLD AUTO: 278 THOUSANDS/UL (ref 149–390)
PMV BLD AUTO: 10.3 FL (ref 8.9–12.7)
POTASSIUM SERPL-SCNC: 4.7 MMOL/L (ref 3.5–5.3)
PROT SERPL-MCNC: 6.1 G/DL (ref 6.4–8.4)
RBC # BLD AUTO: 3.45 MILLION/UL (ref 3.88–5.62)
SODIUM SERPL-SCNC: 136 MMOL/L (ref 135–147)
WBC # BLD AUTO: 4.68 THOUSAND/UL (ref 4.31–10.16)

## 2023-06-03 PROCEDURE — 74177 CT ABD & PELVIS W/CONTRAST: CPT

## 2023-06-03 PROCEDURE — 71275 CT ANGIOGRAPHY CHEST: CPT

## 2023-06-03 PROCEDURE — 80053 COMPREHEN METABOLIC PANEL: CPT | Performed by: EMERGENCY MEDICINE

## 2023-06-03 PROCEDURE — 84484 ASSAY OF TROPONIN QUANT: CPT | Performed by: EMERGENCY MEDICINE

## 2023-06-03 PROCEDURE — 99285 EMERGENCY DEPT VISIT HI MDM: CPT

## 2023-06-03 PROCEDURE — 85025 COMPLETE CBC W/AUTO DIFF WBC: CPT | Performed by: EMERGENCY MEDICINE

## 2023-06-03 PROCEDURE — 93005 ELECTROCARDIOGRAM TRACING: CPT

## 2023-06-03 PROCEDURE — 36415 COLL VENOUS BLD VENIPUNCTURE: CPT | Performed by: EMERGENCY MEDICINE

## 2023-06-03 PROCEDURE — 83690 ASSAY OF LIPASE: CPT | Performed by: EMERGENCY MEDICINE

## 2023-06-03 RX ORDER — SUCRALFATE 1 G/1
1 TABLET ORAL 4 TIMES DAILY
Qty: 60 TABLET | Refills: 0 | Status: SHIPPED | OUTPATIENT
Start: 2023-06-03

## 2023-06-03 RX ADMIN — IOHEXOL 100 ML: 350 INJECTION, SOLUTION INTRAVENOUS at 15:21

## 2023-06-03 NOTE — ED PROVIDER NOTES
History  Chief Complaint   Patient presents with   • Abdominal Pain     Pt    • Chest Pain     Pt states after eating burger for lunch started up with chest pain, abd pain, and headache       History provided by:  Patient   used: No    Abdominal Pain  Pain location:  Generalized  Pain quality: cramping    Pain radiates to:  Does not radiate  Pain severity:  Moderate  Onset quality:  Gradual  Duration:  2 hours  Timing:  Intermittent  Progression:  Waxing and waning  Chronicity:  New  Context: eating    Context comment:  Recent SBO  Relieved by:  Nothing  Worsened by:  Nothing  Ineffective treatments:  None tried  Associated symptoms: chest pain    Associated symptoms: no chills, no cough, no diarrhea, no dysuria, no fever, no nausea, no shortness of breath, no sore throat and no vomiting    Chest pain:     Quality: aching      Duration:  1 hour    Timing:  Intermittent    Progression:  Waxing and waning    Chronicity:  New  Risk factors: obesity    Chest Pain  Associated symptoms: abdominal pain    Associated symptoms: no back pain, no cough, no dizziness, no dysphagia, no fever, no headache, no nausea, no shortness of breath and not vomiting        Prior to Admission Medications   Prescriptions Last Dose Informant Patient Reported? Taking?    Colchicine 0 6 MG CAPS   Yes No   Sig: Take 0 6 mg by mouth daily   EPINEPHrine (EPIPEN) 0 3 mg/0 3 mL SOAJ   Yes No   Sig: Inject 0 3 mg into a muscle   allopurinol (ZYLOPRIM) 100 mg tablet   Yes No   Sig: Take 100 mg by mouth daily   diphenhydrAMINE (BENADRYL) 50 mg capsule   Yes No   Sig: Take 50 mg by mouth every 6 (six) hours as needed   diphenoxylate-atropine (LOMOTIL) 2 5-0 025 mg per tablet   Yes No   Sig: Take 2 tablets by mouth 2 (two) times a day as needed   divalproex sodium (DEPAKOTE) 500 mg DR tablet   Yes No   Sig: Take 500 mg by mouth 2 (two) times a day   escitalopram (LEXAPRO) 20 mg tablet   Yes No   Sig: Take 20 mg by mouth daily furosemide (LASIX) 20 mg tablet   Yes No   Sig: Take 1 tablet by mouth daily as needed   losartan (COZAAR) 100 MG tablet   Yes No   Sig: Take 25 mg by mouth daily   losartan (COZAAR) 100 MG tablet   Yes No   Sig: Take 100 mg by mouth daily   menthol-zinc oxide (CALMOSEPTINE) 0 44-20 6 % OINT   Yes No   Sig: Apply topically   montelukast (SINGULAIR) 10 mg tablet   Yes No   Sig: Take 10 mg by mouth   pantoprazole (PROTONIX) 40 mg tablet   No No   Sig: Take 1 tablet (40 mg total) by mouth daily in the early morning for 28 days Do not start before May 30, 2023  polyethylene glycol (GLYCOLAX) 17 GM/SCOOP powder   Yes No   Sig: Take 17 g by mouth   risperiDONE (RisperDAL) 0 5 mg tablet   Yes No   Sig: Take 0 5 mg by mouth 2 (two) times a day   risperiDONE (RisperDAL) 1 mg tablet   Yes No   Sig: Take 1 mg by mouth 2 (two) times a day   senna (SENOKOT) 8 6 mg   No No   Sig: Take 2 tablets (17 2 mg total) by mouth daily at bedtime as needed for constipation   tamsulosin (FLOMAX) 0 4 mg   Yes No   Sig: Take 0 8 mg by mouth      Facility-Administered Medications: None       History reviewed  No pertinent past medical history  Past Surgical History:   Procedure Laterality Date   • IR THORACENTESIS  5/25/2023       History reviewed  No pertinent family history  I have reviewed and agree with the history as documented  E-Cigarette/Vaping     E-Cigarette/Vaping Substances     Social History     Tobacco Use   • Smoking status: Never   • Smokeless tobacco: Never   Substance Use Topics   • Alcohol use: Not Currently   • Drug use: Not Currently       Review of Systems   Constitutional: Negative for chills and fever  HENT: Negative for facial swelling, sore throat and trouble swallowing  Eyes: Negative for pain and visual disturbance  Respiratory: Negative for cough and shortness of breath  Cardiovascular: Positive for chest pain  Negative for leg swelling  Gastrointestinal: Positive for abdominal pain   Negative for diarrhea, nausea and vomiting  Genitourinary: Negative for dysuria and flank pain  Musculoskeletal: Negative for back pain, neck pain and neck stiffness  Skin: Negative for pallor and rash  Allergic/Immunologic: Negative for environmental allergies and immunocompromised state  Neurological: Negative for dizziness and headaches  Hematological: Negative for adenopathy  Does not bruise/bleed easily  Psychiatric/Behavioral: Negative for agitation and behavioral problems  All other systems reviewed and are negative  Physical Exam  Physical Exam  Vitals and nursing note reviewed  Constitutional:       General: He is not in acute distress  Appearance: He is well-developed  HENT:      Head: Normocephalic and atraumatic  Eyes:      Extraocular Movements: Extraocular movements intact  Cardiovascular:      Rate and Rhythm: Normal rate and regular rhythm  Heart sounds: Normal heart sounds  Pulmonary:      Effort: Pulmonary effort is normal       Breath sounds: Normal breath sounds  Abdominal:      Palpations: Abdomen is soft  Tenderness: There is generalized abdominal tenderness (mild)  There is no guarding or rebound  Musculoskeletal:         General: Normal range of motion  Cervical back: Normal range of motion and neck supple  Skin:     General: Skin is warm and dry  Neurological:      General: No focal deficit present  Mental Status: He is alert and oriented to person, place, and time     Psychiatric:         Mood and Affect: Mood normal          Behavior: Behavior normal          Vital Signs  ED Triage Vitals   Temperature Pulse Respirations Blood Pressure SpO2   06/03/23 1329 06/03/23 1329 06/03/23 1329 06/03/23 1329 06/03/23 1329   98 9 °F (37 2 °C) 65 18 (!) 176/77 96 %      Temp Source Heart Rate Source Patient Position - Orthostatic VS BP Location FiO2 (%)   06/03/23 1329 06/03/23 1329 06/03/23 1329 06/03/23 1329 --   Oral Monitor Sitting Right arm Pain Score       06/03/23 1500       2           Vitals:    06/03/23 1400 06/03/23 1445 06/03/23 1500 06/03/23 1600   BP: 128/56 123/65 125/66 133/71   Pulse: 62 60 64 55   Patient Position - Orthostatic VS:   Lying Lying         Visual Acuity      ED Medications  Medications   iohexol (OMNIPAQUE) 350 MG/ML injection (SINGLE-DOSE) 100 mL (100 mL Intravenous Given 6/3/23 1521)       Diagnostic Studies  Results Reviewed     Procedure Component Value Units Date/Time    HS Troponin I 2hr [[id number]]  (Normal) Collected: 06/03/23 1552    Lab Status: Final result Specimen: Blood from Arm, Left Updated: 06/03/23 1625     hs TnI 2hr 3 ng/L      Delta 2hr hsTnI 0 ng/L     HS Troponin I 4hr [[id number]]     Lab Status: No result Specimen: Blood     Comprehensive metabolic panel [[id number]]  (Abnormal) Collected: 06/03/23 1356    Lab Status: Final result Specimen: Blood from Hand, Left Updated: 06/03/23 1446     Sodium 136 mmol/L      Potassium 4 7 mmol/L      Chloride 105 mmol/L      CO2 27 mmol/L      ANION GAP 4 mmol/L      BUN 17 mg/dL      Creatinine 0 89 mg/dL      Glucose 100 mg/dL      Calcium 8 2 mg/dL      Corrected Calcium 8 9 mg/dL      AST 16 U/L      ALT 7 U/L      Alkaline Phosphatase 49 U/L      Total Protein 6 1 g/dL      Albumin 3 1 g/dL      Total Bilirubin 0 32 mg/dL      eGFR 95 ml/min/1 73sq m     Narrative:      Miriam guidelines for Chronic Kidney Disease (CKD):   •  Stage 1 with normal or high GFR (GFR > 90 mL/min/1 73 square meters)  •  Stage 2 Mild CKD (GFR = 60-89 mL/min/1 73 square meters)  •  Stage 3A Moderate CKD (GFR = 45-59 mL/min/1 73 square meters)  •  Stage 3B Moderate CKD (GFR = 30-44 mL/min/1 73 square meters)  •  Stage 4 Severe CKD (GFR = 15-29 mL/min/1 73 square meters)  •  Stage 5 End Stage CKD (GFR <15 mL/min/1 73 square meters)  Note: GFR calculation is accurate only with a steady state creatinine    Lipase [[id number]]  (Normal) Collected: 06/03/23 1356 Lab Status: Final result Specimen: Blood from Hand, Left Updated: 06/03/23 1446     Lipase 14 u/L     HS Troponin 0hr (reflex protocol) [[id number]]  (Normal) Collected: 06/03/23 1356    Lab Status: Final result Specimen: Blood from Hand, Left Updated: 06/03/23 1437     hs TnI 0hr 3 ng/L     CBC and differential [[id number]]  (Abnormal) Collected: 06/03/23 1356    Lab Status: Final result Specimen: Blood from Hand, Left Updated: 06/03/23 1412     WBC 4 68 Thousand/uL      RBC 3 45 Million/uL      Hemoglobin 10 5 g/dL      Hematocrit 33 8 %      MCV 98 fL      MCH 30 4 pg      MCHC 31 1 g/dL      RDW 13 3 %      MPV 10 3 fL      Platelets 401 Thousands/uL      nRBC 0 /100 WBCs      Neutrophils Relative 55 %      Immat GRANS % 0 %      Lymphocytes Relative 27 %      Monocytes Relative 14 %      Eosinophils Relative 3 %      Basophils Relative 1 %      Neutrophils Absolute 2 58 Thousands/µL      Immature Grans Absolute 0 02 Thousand/uL      Lymphocytes Absolute 1 25 Thousands/µL      Monocytes Absolute 0 64 Thousand/µL      Eosinophils Absolute 0 15 Thousand/µL      Basophils Absolute 0 04 Thousands/µL                  PE Study with CT Abdomen and Pelvis with contrast   Final Result by Bulmaro Abdalla MD (06/03 1619)      1  No pulmonary embolism  2  Moderate to large right pleural effusion  3  Circumferential mural thickening of the esophagus, which may relate to esophagitis  4  Stable 1 6 cm arterially hyperenhancing pancreatic tail lesion, 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  Recommend further evaluation with nonemergent contrast-enhanced MRI abdomen  The study was marked in [facility] for immediate notification        Workstation performed: [id number]                    Procedures  ECG 12 Lead Documentation Only    Date/Time: 6/3/2023 2:14 PM    Performed by: Jese Small MD  Authorized by: Jese Small MD    Indications / Diagnosis:  Chest pain  ECG reviewed by me, the ED Provider: yes    Patient location:  ED  Interpretation:     Interpretation: normal    Rate:     ECG rate assessment: normal    Rhythm:     Rhythm: sinus rhythm    Ectopy:     Ectopy: none    QRS:     QRS axis:  Normal  Conduction:     Conduction: normal    ST segments:     ST segments:  Normal  T waves:     T waves: normal               ED Course  ED Course as of 06/03/23 1756   Sat Jun 03, 2023   1511 WBC: 4 68   1512 Hemoglobin(!): 10 5   1512 Platelet Count: 760   1512 Sodium: 136   1512 Potassium: 4 7   1512 BUN: 17   1512 Creatinine: 0 89   1512 Glucose, Random: 100   1512 hs TnI 0hr: 3   1512 Lipase: 14  Labs reviewed, no significant acute abnormality  1623 PE Study with CT Abdomen and Pelvis with contrast  IMPRESSION:     1  No pulmonary embolism      2  Moderate to large right pleural effusion      3  Circumferential mural thickening of the esophagus, which may relate to esophagitis      4  Stable 1 6 cm arterially hyperenhancing pancreatic tail lesion, 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  Recommend further evaluation with nonemergent contrast-enhanced MRI abdomen  1633 hs TnI 2hr: 3   1633 Delta 2hr hsTnI: 0  Delta trop negative  [id number] Patient and Caregiver informed about workup results, sympx likely from GERD/Esophagitis, on PPI, will add Carafate; known pleural eff for OP follow up, pancreatic ?tumor, will give OP Surg/Onc follow up  SBIRT 22yo+    Flowsheet Row Most Recent Value   Initial Alcohol Screen: US AUDIT-C     1  How often do you have a drink containing alcohol? 0 Filed at: 06/03/2023 1500   2  How many drinks containing alcohol do you have on a typical day you are drinking? 0 Filed at: 06/03/2023 1500   3a  Male UNDER 65: How often do you have five or more drinks on one occasion? 0 Filed at: 06/03/2023 1500   3b  FEMALE Any Age, or MALE 65+: How often do you have 4 or more drinks on one occassion?  0 Filed at: 06/03/2023 1500   Audit-C Score 0 Filed at: 06/03/2023 1500   MARTITA: How many times in the past year have you    Used an illegal drug or used a prescription medication for non-medical reasons? Never Filed at: 06/03/2023 1500                    Medical Decision Making  Patient is a 81-year-old male, history of recent small bowel obstruction, comes in with complaints of chest pain, abdominal pain, according to caregiver, patient was eating burger when he started complaining of chest pain, abdominal pain, also had a cough, caregiver initially concerned about possible aspiration  On exam, patient is conscious, alert, dizziness stable, no acute distress, abdomen soft, mildly distended, mild generalized tenderness, no increased work of breathing, pulses are equal bilaterally  Differential diagnosis: ACS, bowel obstruction due to recent history, aspiration, GERD, PE, pneumonia; we will check labs, EKG, CTA chest abdomen pelvis  Abdominal pain: acute illness or injury  Chest pain: acute illness or injury  Esophagitis: acute illness or injury  Amount and/or Complexity of Data Reviewed  Labs: ordered  Decision-making details documented in ED Course  Radiology: ordered  Decision-making details documented in ED Course  ECG/medicine tests: ordered and independent interpretation performed  Decision-making details documented in ED Course  Risk  Prescription drug management            Disposition  Final diagnoses:   Chest pain   Abdominal pain   Pleural effusion   Esophagitis   Pancreatic lesion     Time reflects when diagnosis was documented in both MDM as applicable and the Disposition within this note     Time User Action Codes Description Comment    6/3/2023  2:15 PM Mosetta Dago Add [R07 9] Chest pain     6/3/2023  2:15 PM Mosetta Dago Add [R10 9] Abdominal pain     6/3/2023  4:39 PM Mosetta Dago Add [J90] Pleural effusion     6/3/2023  4:40 PM Mosetta Dago Add [K20 90] Esophagitis     6/3/2023  4:45 PM Mosetta Dago Add [K86 9] Pancreatic lesion       ED Disposition     ED Disposition   Discharge    Condition   Stable    Date/Time   Sat Mark 3, 2023  4:39 PM    Comment   Destinirichi Angry discharge to home/self care  Follow-up Information     Follow up With Specialties Details Why Contact Info Additional Information    Gaudencio Grossman DO Pediatrics Schedule an appointment as soon as possible for a visit   [phone]   [address] [place] [id number]  [phone]       B[facility] Schedule an appointment as soon as possible for a visit  For likely Esophagitis [address]  [address] [facility] [address]  Floyd Potts [address] [facility], [address], [address], [place], [place], [address]   [address] [facility] Pulmonology Schedule an appointment as soon as possible for a visit  For Pleural effusion (fluid on the right lung) [id number]   [address] [facility] [address]  [address] [facility], [address] [facility], [place], [place], [address] [phone]    Danita Holter, MD Surgical Oncology Schedule an appointment as soon as possible for a visit  For Pancreatic lesion [address] [facility] 29  [address] [facility]  [phone]             Discharge Medication List as of 6/3/2023  4:50 PM      START taking these medications    Details   sucralfate (CARAFATE) 1 g tablet Take 1 tablet (1 g total) by mouth 4 (four) times a day, Starting Sat 6/3/2023, Normal         CONTINUE these medications which have NOT CHANGED    Details   allopurinol (ZYLOPRIM) 100 mg tablet Take 100 mg by mouth daily, Starting Fri 3/24/2023, Until Sat 3/23/2024, Historical Med      Colchicine 0 6 MG CAPS Take 0 6 mg by mouth daily, Starting Mon 4/24/2023, Historical Med      diphenhydrAMINE (BENADRYL) 50 mg capsule Take 50 mg by mouth every 6 (six) hours as needed, Starting Fri 3/17/2023, Historical Med      diphenoxylate-atropine (LOMOTIL) 2 5-0 025 mg per tablet Take 2 tablets by mouth 2 (two) times a day as needed, Starting Fri 3/17/2023, Historical Med      divalproex sodium (DEPAKOTE) 500 mg DR tablet Take 500 mg by mouth 2 (two) times a day, Historical Med      EPINEPHrine (EPIPEN) 0 3 mg/0 3 mL SOAJ Inject 0 3 mg into a muscle, Starting Fri 3/17/2023, Historical Med      escitalopram (LEXAPRO) 20 mg tablet Take 20 mg by mouth daily, Historical Med      furosemide (LASIX) 20 mg tablet Take 1 tablet by mouth daily as needed, Starting Fri 3/17/2023, Until Wed 9/13/2023 at 2359, Historical Med      !! losartan (COZAAR) 100 MG tablet Take 25 mg by mouth daily, Historical Med      !! losartan (COZAAR) 100 MG tablet Take 100 mg by mouth daily, Starting Tue 1/24/2023, Until Wed 1/24/2024, Historical Med      menthol-zinc oxide (CALMOSEPTINE) 0 44-20 6 % OINT Apply topically, Starting Fri 3/17/2023, Historical Med      montelukast (SINGULAIR) 10 mg tablet Take 10 mg by mouth, Starting Fri 3/17/2023, Historical Med      pantoprazole (PROTONIX) 40 mg tablet Take 1 tablet (40 mg total) by mouth daily in the early morning for 28 days Do not start before May 30, 2023 , Starting Tue 5/30/2023, Until Tue 6/27/2023, Normal      polyethylene glycol (GLYCOLAX) 17 GM/SCOOP powder Take 17 g by mouth, Starting Fri 4/21/2023, Until Sat 4/20/2024 at 2359, Historical Med      !! risperiDONE (RisperDAL) 0 5 mg tablet Take 0 5 mg by mouth 2 (two) times a day, Historical Med      !! risperiDONE (RisperDAL) 1 mg tablet Take 1 mg by mouth 2 (two) times a day, Historical Med      senna (SENOKOT) 8 6 mg Take 2 tablets (17 2 mg total) by mouth daily at bedtime as needed for constipation, Starting Mon 5/29/2023, Until Wed 6/28/2023 at 2359, Normal      tamsulosin (FLOMAX) 0 4 mg Take 0 8 mg by mouth, Starting Fri 3/17/2023, Until Sat 3/16/2024 at 2359, Historical Med       !! - Potential duplicate medications found  Please discuss with provider  No discharge procedures on file      PDMP Review     None          ED Provider  Electronically Signed by           Dalia Yanez MD  06/03/23 [id number]

## 2023-06-04 LAB
ATRIAL RATE: 55 BPM
ATRIAL RATE: 65 BPM
P AXIS: 25 DEGREES
P AXIS: 39 DEGREES
PR INTERVAL: 132 MS
QRS AXIS: -49 DEGREES
QRS AXIS: 8 DEGREES
QRSD INTERVAL: 102 MS
QRSD INTERVAL: 90 MS
QT INTERVAL: 408 MS
QT INTERVAL: 422 MS
QTC INTERVAL: 407 MS
QTC INTERVAL: 424 MS
T WAVE AXIS: 43 DEGREES
T WAVE AXIS: 48 DEGREES
VENTRICULAR RATE: 56 BPM
VENTRICULAR RATE: 65 BPM

## 2023-06-04 PROCEDURE — 93010 ELECTROCARDIOGRAM REPORT: CPT | Performed by: INTERNAL MEDICINE

## 2023-06-06 ENCOUNTER — TELEPHONE (OUTPATIENT)
Dept: HEMATOLOGY ONCOLOGY | Facility: CLINIC | Age: 58
End: 2023-06-06

## 2023-06-06 NOTE — TELEPHONE ENCOUNTER
Appointment Schedule   Who are you speaking with? [facility]   If it is not the patient, are they listed on an active communication consent form? N/A   Which provider is the appointment scheduled with? Dr Trey Roque   At which location is the appointment scheduled for? [facility]   When is the appointment scheduled? Please list date and time 6/27/23 @ 10:30am   What is the reason for this appointment? New patient consult   Did patient voice understanding of the details of this appointment? yes   Was the no show policy reviewed with patient?  yes

## 2023-06-26 PROBLEM — K86.9 PANCREATIC LESION: Status: ACTIVE | Noted: 2023-05-24

## 2023-06-27 ENCOUNTER — CONSULT (OUTPATIENT)
Dept: SURGICAL ONCOLOGY | Facility: CLINIC | Age: 58
End: 2023-06-27
Payer: MEDICARE

## 2023-06-27 ENCOUNTER — TELEPHONE (OUTPATIENT)
Dept: HEMATOLOGY ONCOLOGY | Facility: CLINIC | Age: 58
End: 2023-06-27

## 2023-06-27 VITALS
SYSTOLIC BLOOD PRESSURE: 130 MMHG | OXYGEN SATURATION: 98 % | HEIGHT: 66 IN | RESPIRATION RATE: 16 BRPM | TEMPERATURE: 98.2 F | DIASTOLIC BLOOD PRESSURE: 79 MMHG | HEART RATE: 69 BPM | BODY MASS INDEX: 39.37 KG/M2 | WEIGHT: 245 LBS

## 2023-06-27 DIAGNOSIS — K86.9 PANCREATIC LESION: Primary | ICD-10-CM

## 2023-06-27 PROCEDURE — 99205 OFFICE O/P NEW HI 60 MIN: CPT | Performed by: SURGERY

## 2023-06-27 RX ORDER — OMEPRAZOLE 20 MG/1
CAPSULE, DELAYED RELEASE ORAL
COMMUNITY
Start: 2023-06-18

## 2023-06-27 RX ORDER — IBUPROFEN 400 MG/1
TABLET ORAL
COMMUNITY
Start: 2023-05-30

## 2023-06-27 NOTE — LETTER
June 27, 2023     Elisabeth Rodriguez DO  [address] [place] [id number]    Patient: Jahaira Franco   YOB: 1965   Date of Visit: 6/27/2023       Dear Dr Ely Baltazar: Thank you for referring Jahaira Franco to me for evaluation  Below are my notes for this consultation  If you have questions, please do not hesitate to call me  I look forward to following your patient along with you  Sincerely,        Fred Dias MD        CC: No Recipients    Fred Dias MD  6/27/2023 10:52 AM  Sign when Signing Visit               Surgical Oncology Consult       [address] [facility] [address] [address] [phone]    Jahaira Franco  1965  [id number]  [address] [facility] [address] [address] [phone]    Diagnoses and all orders for this visit:    Pancreatic lesion  -     Ambulatory referral to Gastroenterology; Future  -     Ambulatory Referral to Vascular Surgery; Future  -     CT abdomen pelvis w contrast; Future  -     BUN; Future  -     Creatinine, serum; Future    Other orders  -     ibuprofen (MOTRIN) 400 mg tablet; TAKE ONE TABLET BY MOUTH EVERY 6 HOURS AS NEEDED FOR FEVER, MILD PAIN, OR HEADACHES  -     omeprazole (PriLOSEC) 20 mg delayed release capsule        Chief Complaint   Patient presents with   • New Patient Visit       Return in about 6 months (around 12/27/2023) for Office Visit, Imaging - See orders, with Shayy  Oncology History    No history exists  History of Present Illness: 79-year-old male who was recently hospitalized for a small bowel obstruction  CTA from [place] 3, 2023 revealed arterially enhancing lesion in the pancreatic tail measuring 1 6 cm  This was concerning for a neuroendocrine tumor  There was also a 1 8 cm left splenic artery aneurysm    There was thickening of the esophagus  I personally reviewed his films  He had a follow-up chest CT on June 6 that showed the splenic artery aneurysm measured 2 1 cm  I have reviewed his films that were performed here  He has recovered from his bowel obstruction  He is eating well  He denies any abdominal pain, nausea or vomiting  No personal history of pancreatitis  He denies any family history of pancreas cancer  Review of Systems  Complete ROS Surg Onc:   Constitutional: The patient denies new or recent history of general fatigue, no recent weight loss, no change in appetite  Eyes: No complaints of visual problems, no scleral icterus  ENT: no complaints of ear pain, no hoarseness, no difficulty swallowing,  no tinnitus and no new masses in head, oral cavity, or neck  Cardiovascular: No complaints of chest pain, no palpitations, no ankle edema  Respiratory: No complaints of shortness of breath, no cough  Gastrointestinal: No complaints of jaundice, no bloody stools, no pale stools  Genitourinary: No complaints of dysuria, no hematuria, no nocturia, no frequent urination, no urethral discharge  Musculoskeletal: No complaints of weakness, paralysis, joint stiffness or arthralgias  Integumentary: No complaints of rash, no new lesions  Neurological: No complaints of convulsions, no seizures, no dizziness  Hematologic/Lymphatic: No complaints of easy bruising  Endocrine:  No hot or cold intolerance  No polydipsia, polyphagia, or polyuria  Allergy/immunology:  No environmental allergies  No food allergies  Not immunocompromised  Skin:  No pallor or rash  No wound            Patient Active Problem List   Diagnosis   • B[facility] disorder ([facility] )   • BPH (benign prostatic hyperplasia)   • Cardiomegaly   • Chronic diarrhea   • Essential hypertension   • Gouty arthritis of right great toe   • Mental impairment   • Mild persistent asthma without complication   • Morbid obesity ([facility] )   • Normocytic anemia   • LUPIS (obstructive sleep apnea)   • Recurrent pleural effusion on right   • Small bowel obstruction, partial (HCC)   • Esophagitis   • Pancreatic lesion   • Aspiration pneumonitis (HCC)   • Acute anemia     No past medical history on file  Past Surgical History:   Procedure Laterality Date   • IR THORACENTESIS  5/25/2023     No family history on file  Social History     Socioeconomic History   • Marital status: Single     Spouse name: Not on file   • Number of children: Not on file   • Years of education: Not on file   • Highest education level: Not on file   Occupational History   • Not on file   Tobacco Use   • Smoking status: Never   • Smokeless tobacco: Never   Substance and Sexual Activity   • Alcohol use: Not Currently   • Drug use: Not Currently   • Sexual activity: Not on file   Other Topics Concern   • Not on file   Social History Narrative   • Not on file     Social Determinants of Health     Financial Resource Strain: Not on file   Food Insecurity: No Food Insecurity (5/26/2023)    Hunger Vital Sign    • Worried About Running Out of Food in the Last Year: Never true    • Ran Out of Food in the Last Year: Never true   Transportation Needs: No Transportation Needs (5/26/2023)    PRAPARE - Transportation    • Lack of Transportation (Medical): No    • Lack of Transportation (Non-Medical):  No   Physical Activity: Not on file   Stress: Not on file   Social Connections: Not on file   Intimate Partner Violence: Not on file   Housing Stability: Low Risk  (5/26/2023)    Housing Stability Vital Sign    • Unable to Pay for Housing in the Last Year: No    • Number of Places Lived in the Last Year: 1    • Unstable Housing in the Last Year: No       Current Outpatient Medications:   •  allopurinol (ZYLOPRIM) 100 mg tablet, Take 100 mg by mouth daily, Disp: , Rfl:   •  Colchicine 0 6 MG CAPS, Take 0 6 mg by mouth daily, Disp: , Rfl:   •  diphenhydrAMINE (BENADRYL) 50 mg capsule, Take 50 mg by mouth every 6 (six) hours as needed, Disp: , Rfl:   •  diphenoxylate-atropine (LOMOTIL) 2 5-0 025 mg per tablet, Take 2 tablets by mouth 2 (two) times a day as needed, Disp: , Rfl:   •  divalproex sodium (DEPAKOTE) 500 mg DR tablet, Take 500 mg by mouth 2 (two) times a day, Disp: , Rfl:   •  EPINEPHrine (EPIPEN) 0 3 mg/0 3 mL SOAJ, Inject 0 3 mg into a muscle, Disp: , Rfl:   •  escitalopram (LEXAPRO) 20 mg tablet, Take 20 mg by mouth daily, Disp: , Rfl:   •  furosemide (LASIX) 20 mg tablet, Take 1 tablet by mouth daily as needed, Disp: , Rfl:   •  ibuprofen (MOTRIN) 400 mg tablet, TAKE ONE TABLET BY MOUTH EVERY 6 HOURS AS NEEDED FOR FEVER, MILD PAIN, OR HEADACHES, Disp: , Rfl:   •  losartan (COZAAR) 100 MG tablet, Take 25 mg by mouth daily, Disp: , Rfl:   •  losartan (COZAAR) 100 MG tablet, Take 100 mg by mouth daily, Disp: , Rfl:   •  menthol-zinc oxide (CALMOSEPTINE) 0 44-20 6 % OINT, Apply topically, Disp: , Rfl:   •  montelukast (SINGULAIR) 10 mg tablet, Take 10 mg by mouth, Disp: , Rfl:   •  omeprazole (PriLOSEC) 20 mg delayed release capsule, , Disp: , Rfl:   •  pantoprazole (PROTONIX) 40 mg tablet, Take 1 tablet (40 mg total) by mouth daily in the early morning for 28 days Do not start before May 30, 2023 , Disp: 28 tablet, Rfl: 0  •  polyethylene glycol (GLYCOLAX) 17 GM/SCOOP powder, Take 17 g by mouth, Disp: , Rfl:   •  risperiDONE (RisperDAL) 0 5 mg tablet, Take 0 5 mg by mouth 2 (two) times a day, Disp: , Rfl:   •  risperiDONE (RisperDAL) 1 mg tablet, Take 1 mg by mouth 2 (two) times a day, Disp: , Rfl:   •  senna (SENOKOT) 8 6 mg, Take 2 tablets (17 2 mg total) by mouth daily at bedtime as needed for constipation, Disp: 60 tablet, Rfl: 0  •  sucralfate (CARAFATE) 1 g tablet, Take 1 tablet (1 g total) by mouth 4 (four) times a day, Disp: 60 tablet, Rfl: 0  •  tamsulosin (FLOMAX) 0 4 mg, Take 0 8 mg by mouth, Disp: , Rfl:   Allergies   Allergen Reactions   • Bee Venom Anaphylaxis, Hives and Swelling     Vitals:    06/27/23 1028   BP: 130/79   Pulse: 69   Resp: 16   Temp: 98 2 °F (36 8 °C)   SpO2: 98%       Physical Exam   Constitutional: General appearance: The Patient is well-developed and well-nourished who appears the stated age in no acute distress  Patient is pleasant and talkative  HEENT:  Normocephalic  Sclerae are anicteric  Mucous membranes are moist  Neck is supple without adenopathy  No JVD  Chest: The lungs are clear to auscultation  Cardiac: Heart is regular rate  Abdomen: Abdomen is soft, non-tender, non-distended and without masses  Extremities: There is no clubbing or cyanosis  There is no edema  Symmetric  Neuro: Grossly nonfocal  Gait is normal      Lymphatic: No evidence of cervical adenopathy bilaterally  Skin: Warm, anicteric  Psych:  Patient is pleasant and talkative  Breasts:      Pathology:  [unfilled]    Labs:      Imaging  CT CHEST WO CONTRAST    Result Date: 6/7/2023  Narrative: History: Dyspnea  Pleural effusion  Exam: Nonenhanced CT of the chest    Technique: Using helical technique, axial images were obtained through the chest   Coronal and sagittal reformations were performed  Comparison: Chest x-ray 5/24/2023, 4/7/2023  Chest CT 1/20/2023   Chest CT  Lungs/Pleura: Moderate to large right pleural effusion similar to the CT study 1/20/2023  Compressive atelectasis of the right lower lobe similar to the prior study  Pulmonary nodule left lower lobe measuring 1 cm x 0 7 cm similar to the prior study  No left pleural effusion  No diffuse pulmonary edema  Mediastinum/Lymph nodes: No definite mediastinal adenopathy  Heart/Vessels: Heart stable  Cardiac silhouette not enlarged  No pericardial effusion  Chest Wall: Normal  Upper Abdomen:  Partially visualized gallbladder demonstrates gallstones  No evidence of pericholecystic inflammation  Splenic artery aneurysm with a calcified wall measuring 2 1 cm x 1 8 cm similar to the prior study  Bones: No acute disease        Impression: Impression: 1  Moderate to large right pleural effusion similar to the CT study 1/20/2023  Associated compressive right lower lobe atelectasis  2  Stable pulmonary nodule left lower lobe  3  Partially visualized gallbladder demonstrates gallstones  4  Splenic artery aneurysm, with a maximum dimension of 2 1 cm, with rim calcification  CT examination performed with dose lowering protocol in accordance with TONIA  [id number]    PE Study with CT Abdomen and Pelvis with contrast    Result Date: 6/3/2023  Narrative: CT PULMONARY ANGIOGRAM OF THE CHEST AND CT ABDOMEN AND PELVIS WITH INTRAVENOUS CONTRAST INDICATION:   Chest pain, Abdominal pain  COMPARISON: CT abdomen pelvis 5/25/2023  TECHNIQUE:  CT examination of the chest, abdomen and pelvis was performed  Thin section CT angiographic technique was used in the chest in order to evaluate for pulmonary embolus and coronal 3D MIP postprocessing was performed on the acquisition scanner  Multiplanar 2D reformatted images were created from the source data  This examination, like all CT scans performed in the [facility], was performed utilizing techniques to minimize radiation dose exposure, including the use of iterative reconstruction and automated exposure control  Radiation dose length product (DLP) for this visit:  1884 mGy-cm IV Contrast:  100 mL of iohexol (OMNIPAQUE) Enteric Contrast:  Enteric contrast was not administered  FINDINGS: CHEST PULMONARY ARTERIAL TREE:  No pulmonary embolus is seen  LUNGS: No tracheal or endobronchial lesion  Stable left lower lobe calcified pleural plaque large(3/170)  No suspicious noncalcified pulmonary nodules  PLEURA: Moderate to large right pleural effusion with adjacent compressive atelectasis, similar to prior studies  HEART/AORTA:  Heart is unremarkable for patient's age  No thoracic aortic aneurysm   MEDIASTINUM AND ANNA: Circumferential mural thickening of the esophagus, which may relate to esophagitis  No mediastinal or hilar lymphadenopathy  CHEST WALL AND LOWER NECK: Bilateral gynecomastia  ABDOMEN LIVER/BILIARY TREE:  Unremarkable  GALLBLADDER: Partially contracted  Cholelithiasis  No pericholecystic inflammatory change  SPLEEN: Stable rounded arterially hyperenhancing lesion in the pancreatic tail measuring 1 6 cm and 70 Hounsfield units (2/204, 601/98)  This lesion enhances less than the adjacent splenic artery and more than the adjacent spleen, therefore, this likely does not represent a noncalcified splenic artery aneurysm or an intrapancreatic splenule  This finding is most concerning for neuroendocrine tumor  No main pancreatic duct dilatation  PANCREAS:  Unremarkable  ADRENAL GLANDS:  Unremarkable  KIDNEYS/URETERS:  No hydronephrosis or urinary tract calculus  Mild bilateral perinephric stranding, greater on the right compared to left  One or more sharply circumscribed subcentimeter renal hypodensities are present, too small to accurately characterize, and statistically most likely benign findings  According to recent literature (Radiology 2019) no further workup of these findings is recommended  STOMACH AND BOWEL: No evidence of bowel obstruction  APPENDIX: A normal appendix was visualized  ABDOMINOPELVIC CAVITY:  No ascites  No pneumoperitoneum  No lymphadenopathy  VESSELS:  No evidence of abdominal aortic aneurysm  Peripherally calcified 1 8 cm left splenic artery aneurysm  PELVIS REPRODUCTIVE ORGANS:  Unremarkable for patient's age  URINARY BLADDER: Underdistended, limiting its evaluation  ABDOMINAL WALL/INGUINAL REGIONS: Small to moderate fat-containing left inguinal hernia  Small fat-containing umbilical hernia  OSSEOUS STRUCTURES:  No acute fracture or destructive osseous lesion  Spinal degenerative changes are noted  Impression: 1  No pulmonary embolism  2  Moderate to large right pleural effusion   3  Circumferential mural thickening of the esophagus, which may relate to esophagitis  4  Stable 1 6 cm arterially hyperenhancing pancreatic tail lesion, 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  Recommend further evaluation with nonemergent contrast-enhanced MRI abdomen  The study was marked in [facility] for immediate notification  Workstation performed: [id number]     I reviewed the above laboratory and imaging data  Discussion/Summary: 81-year-old male with what appears to be a neuroendocrine tumor in the tail of the pancreas  This is under 2 cm in size  I discussed with him and his caregiver that this is unlikely to metastasize based on the size  I did discuss potentially obtaining an endoscopic ultrasound, but I would recommend this if this would increase in size  There is thickening of the esophagus on his CT  I would recommend that he see GI for endoscopy, and if they feel that EUS is warranted it can be performed  The splenic artery aneurysm is 1 8 cm on the imaging here and over 2 cm on the outside imaging  Because of this increase in size and that it is over 2 cm I recommended that he see vascular surgery to see if any intervention should be entertained  I will see him again in 6 months with a repeat CT to make sure the neuroendocrine tumor is stable  He is agreeable to this  All his questions were answered

## 2023-06-27 NOTE — TELEPHONE ENCOUNTER
Appointment Confirmation   Who are you speaking with? Anatoliy from [employer] [facility]    If it is not the patient, are they listed on an active communication consent form? N/A   Which provider is the appointment scheduled with? Dr Burton Alba   When is the appointment scheduled? Please list date and time 01/11/2023 @10AM    At which location is the appointment scheduled to take place? [place]   Did caller verbalize understanding of appointment details?  Yes

## 2023-06-27 NOTE — PROGRESS NOTES
Surgical Oncology Consult       [facility] [address] [address] [phone]    Radha Arshad  1965  [id number]  [facility] [address] [address] [phone]    Diagnoses and all orders for this visit:    Pancreatic lesion  -     Ambulatory referral to Gastroenterology; Future  -     Ambulatory Referral to Vascular Surgery; Future  -     CT abdomen pelvis w contrast; Future  -     BUN; Future  -     Creatinine, serum; Future    Other orders  -     ibuprofen (MOTRIN) 400 mg tablet; TAKE ONE TABLET BY MOUTH EVERY 6 HOURS AS NEEDED FOR FEVER, MILD PAIN, OR HEADACHES  -     omeprazole (PriLOSEC) 20 mg delayed release capsule        Chief Complaint   Patient presents with   • New Patient Visit       Return in about 6 months (around 12/27/2023) for Office Visit, Imaging - See orders, with Shayy  Oncology History    No history exists  History of Present Illness: 61-year-old male who was recently hospitalized for a small bowel obstruction  CTA from [place] 3, 2023 revealed arterially enhancing lesion in the pancreatic tail measuring 1 6 cm  This was concerning for a neuroendocrine tumor  There was also a 1 8 cm left splenic artery aneurysm  There was thickening of the esophagus  I personally reviewed his films  He had a follow-up chest CT on June 6 that showed the splenic artery aneurysm measured 2 1 cm  I have reviewed his films that were performed here  He has recovered from his bowel obstruction  He is eating well  He denies any abdominal pain, nausea or vomiting  No personal history of pancreatitis  He denies any family history of pancreas cancer      Review of Systems  Complete ROS Surg Onc:   Constitutional: The patient denies new or recent history of general fatigue, no recent weight loss, no change in appetite  Eyes: No complaints of visual problems, no scleral icterus  ENT: no complaints of ear pain, no hoarseness, no difficulty swallowing,  no tinnitus and no new masses in head, oral cavity, or neck  Cardiovascular: No complaints of chest pain, no palpitations, no ankle edema  Respiratory: No complaints of shortness of breath, no cough  Gastrointestinal: No complaints of jaundice, no bloody stools, no pale stools  Genitourinary: No complaints of dysuria, no hematuria, no nocturia, no frequent urination, no urethral discharge  Musculoskeletal: No complaints of weakness, paralysis, joint stiffness or arthralgias  Integumentary: No complaints of rash, no new lesions  Neurological: No complaints of convulsions, no seizures, no dizziness  Hematologic/Lymphatic: No complaints of easy bruising  Endocrine:  No hot or cold intolerance  No polydipsia, polyphagia, or polyuria  Allergy/immunology:  No environmental allergies  No food allergies  Not immunocompromised  Skin:  No pallor or rash  No wound  Patient Active Problem List   Diagnosis   • Bipolar disorder ([facility] )   • BPH (benign prostatic hyperplasia)   • Cardiomegaly   • Chronic diarrhea   • Essential hypertension   • Gouty arthritis of right great toe   • Mental impairment   • Mild persistent asthma without complication   • Morbid obesity ([facility] )   • Normocytic anemia   • LUPIS (obstructive sleep apnea)   • Recurrent pleural effusion on right   • Small bowel obstruction, partial (HCC)   • Esophagitis   • Pancreatic lesion   • Aspiration pneumonitis (HCC)   • Acute anemia     No past medical history on file  Past Surgical History:   Procedure Laterality Date   • IR THORACENTESIS  5/25/2023     No family history on file    Social History     Socioeconomic History   • Marital status: Single     Spouse name: Not on file   • Number of children: Not on file   • Years of education: Not on file   • Highest education level: Not on file   Occupational History   • Not on file   Tobacco Use   • Smoking status: Never   • Smokeless tobacco: Never   Substance and Sexual Activity   • Alcohol use: Not Currently   • Drug use: Not Currently   • Sexual activity: Not on file   Other Topics Concern   • Not on file   Social History Narrative   • Not on file     Social Determinants of Health     Financial Resource Strain: Not on file   Food Insecurity: No Food Insecurity (5/26/2023)    Hunger Vital Sign    • Worried About Running Out of Food in the Last Year: Never true    • Ran Out of Food in the Last Year: Never true   Transportation Needs: No Transportation Needs (5/26/2023)    PRAPARE - Transportation    • Lack of Transportation (Medical): No    • Lack of Transportation (Non-Medical):  No   Physical Activity: Not on file   Stress: Not on file   Social Connections: Not on file   Intimate Partner Violence: Not on file   Housing Stability: Low Risk  (5/26/2023)    Housing Stability Vital Sign    • Unable to Pay for Housing in the Last Year: No    • Number of Places Lived in the Last Year: 1    • Unstable Housing in the Last Year: No       Current Outpatient Medications:   •  allopurinol (ZYLOPRIM) 100 mg tablet, Take 100 mg by mouth daily, Disp: , Rfl:   •  Colchicine 0 6 MG CAPS, Take 0 6 mg by mouth daily, Disp: , Rfl:   •  diphenhydrAMINE (BENADRYL) 50 mg capsule, Take 50 mg by mouth every 6 (six) hours as needed, Disp: , Rfl:   •  diphenoxylate-atropine (LOMOTIL) 2 5-0 025 mg per tablet, Take 2 tablets by mouth 2 (two) times a day as needed, Disp: , Rfl:   •  divalproex sodium (DEPAKOTE) 500 mg DR tablet, Take 500 mg by mouth 2 (two) times a day, Disp: , Rfl:   •  EPINEPHrine (EPIPEN) 0 3 mg/0 3 mL SOAJ, Inject 0 3 mg into a muscle, Disp: , Rfl:   •  escitalopram (LEXAPRO) 20 mg tablet, Take 20 mg by mouth daily, Disp: , Rfl:   •  furosemide (LASIX) 20 mg tablet, Take 1 tablet by mouth daily as needed, Disp: , Rfl:   •  ibuprofen (MOTRIN) 400 mg tablet, TAKE ONE TABLET BY MOUTH EVERY 6 HOURS AS NEEDED FOR FEVER, MILD PAIN, OR HEADACHES, Disp: , Rfl:   •  losartan (COZAAR) 100 MG tablet, Take 25 mg by mouth daily, Disp: , Rfl:   •  losartan (COZAAR) 100 MG tablet, Take 100 mg by mouth daily, Disp: , Rfl:   •  menthol-zinc oxide (CALMOSEPTINE) 0 44-20 6 % OINT, Apply topically, Disp: , Rfl:   •  montelukast (SINGULAIR) 10 mg tablet, Take 10 mg by mouth, Disp: , Rfl:   •  omeprazole (PriLOSEC) 20 mg delayed release capsule, , Disp: , Rfl:   •  pantoprazole (PROTONIX) 40 mg tablet, Take 1 tablet (40 mg total) by mouth daily in the early morning for 28 days Do not start before May 30, 2023 , Disp: 28 tablet, Rfl: 0  •  polyethylene glycol (GLYCOLAX) 17 GM/SCOOP powder, Take 17 g by mouth, Disp: , Rfl:   •  risperiDONE (RisperDAL) 0 5 mg tablet, Take 0 5 mg by mouth 2 (two) times a day, Disp: , Rfl:   •  risperiDONE (RisperDAL) 1 mg tablet, Take 1 mg by mouth 2 (two) times a day, Disp: , Rfl:   •  senna (SENOKOT) 8 6 mg, Take 2 tablets (17 2 mg total) by mouth daily at bedtime as needed for constipation, Disp: 60 tablet, Rfl: 0  •  sucralfate (CARAFATE) 1 g tablet, Take 1 tablet (1 g total) by mouth 4 (four) times a day, Disp: 60 tablet, Rfl: 0  •  tamsulosin (FLOMAX) 0 4 mg, Take 0 8 mg by mouth, Disp: , Rfl:   Allergies   Allergen Reactions   • Bee Venom Anaphylaxis, Hives and Swelling     Vitals:    06/27/23 1028   BP: 130/79   Pulse: 69   Resp: 16   Temp: 98 2 °F (36 8 °C)   SpO2: 98%       Physical Exam   Constitutional: General appearance: The Patient is well-developed and well-nourished who appears the stated age in no acute distress  Patient is pleasant and talkative  HEENT:  Normocephalic  Sclerae are anicteric  Mucous membranes are moist  Neck is supple without adenopathy  No JVD  Chest: The lungs are clear to auscultation  Cardiac: Heart is regular rate  Abdomen: Abdomen is soft, non-tender, non-distended and without masses       Extremities: There is no clubbing or cyanosis  There is no edema  Symmetric  Neuro: Grossly nonfocal  Gait is normal      Lymphatic: No evidence of cervical adenopathy bilaterally  Skin: Warm, anicteric  Psych:  Patient is pleasant and talkative  Breasts:      Pathology:  [unfilled]    Labs:      Imaging  CT CHEST WO CONTRAST    Result Date: 6/7/2023  Narrative: History: Dyspnea  Pleural effusion    Exam: Nonenhanced CT of the chest    Technique: Using helical technique, axial images were obtained through the chest   Coronal and sagittal reformations were performed    Comparison: Chest x-ray 5/24/2023, 4/7/2023  Chest CT 1/20/2023   Chest CT  Lungs/Pleura: Moderate to large right pleural effusion similar to the CT study 1/20/2023  Compressive atelectasis of the right lower lobe similar to the prior study  Pulmonary nodule left lower lobe measuring 1 cm x 0 7 cm similar to the prior study  No left pleural effusion  No diffuse pulmonary edema  Mediastinum/Lymph nodes: No definite mediastinal adenopathy  Heart/Vessels: Heart stable  Cardiac silhouette not enlarged  No pericardial effusion  Chest Wall: Normal  Upper Abdomen:  Partially visualized gallbladder demonstrates gallstones  No evidence of pericholecystic inflammation  Splenic artery aneurysm with a calcified wall measuring 2 1 cm x 1 8 cm similar to the prior study  Bones: No acute disease       Impression: Impression: 1  Moderate to large right pleural effusion similar to the CT study 1/20/2023  Associated compressive right lower lobe atelectasis  2  Stable pulmonary nodule left lower lobe  3  Partially visualized gallbladder demonstrates gallstones  4  Splenic artery aneurysm, with a maximum dimension of 2 1 cm, with rim calcification  CT examination performed with dose lowering protocol in accordance with TONIA   [id number]    PE Study with CT Abdomen and Pelvis with contrast    Result Date: 6/3/2023  Narrative: CT PULMONARY ANGIOGRAM OF THE CHEST AND CT ABDOMEN AND PELVIS WITH INTRAVENOUS CONTRAST INDICATION:   Chest pain, Abdominal pain  COMPARISON: CT abdomen pelvis 5/25/2023  TECHNIQUE:  CT examination of the chest, abdomen and pelvis was performed  Thin section CT angiographic technique was used in the chest in order to evaluate for pulmonary embolus and coronal 3D MIP postprocessing was performed on the acquisition scanner  Multiplanar 2D reformatted images were created from the source data  This examination, like all CT scans performed in the [facility], was performed utilizing techniques to minimize radiation dose exposure, including the use of iterative reconstruction and automated exposure control  Radiation dose length product (DLP) for this visit:  1884 mGy-cm IV Contrast:  100 mL of iohexol (OMNIPAQUE) Enteric Contrast:  Enteric contrast was not administered  FINDINGS: CHEST PULMONARY ARTERIAL TREE:  No pulmonary embolus is seen  LUNGS: No tracheal or endobronchial lesion  Stable left lower lobe calcified pleural plaque large(3/170)  No suspicious noncalcified pulmonary nodules  PLEURA: Moderate to large right pleural effusion with adjacent compressive atelectasis, similar to prior studies  HEART/AORTA:  Heart is unremarkable for patient's age  No thoracic aortic aneurysm  MEDIASTINUM AND ANNA: Circumferential mural thickening of the esophagus, which may relate to esophagitis  No mediastinal or hilar lymphadenopathy  CHEST WALL AND LOWER NECK: Bilateral gynecomastia  ABDOMEN LIVER/BILIARY TREE:  Unremarkable  GALLBLADDER: Partially contracted  Cholelithiasis  No pericholecystic inflammatory change  SPLEEN: Stable rounded arterially hyperenhancing lesion in the pancreatic tail measuring 1 6 cm and 70 Hounsfield units (2/204, 601/98)   This lesion enhances less than the adjacent splenic artery and more than the adjacent spleen, therefore, this likely does not represent a noncalcified splenic artery aneurysm or an intrapancreatic splenule  This finding is most concerning for neuroendocrine tumor  No main pancreatic duct dilatation  PANCREAS:  Unremarkable  ADRENAL GLANDS:  Unremarkable  KIDNEYS/URETERS:  No hydronephrosis or urinary tract calculus  Mild bilateral perinephric stranding, greater on the right compared to left  One or more sharply circumscribed subcentimeter renal hypodensities are present, too small to accurately characterize, and statistically most likely benign findings  According to recent literature (Radiology 2019) no further workup of these findings is recommended  STOMACH AND BOWEL: No evidence of bowel obstruction  APPENDIX: A normal appendix was visualized  ABDOMINOPELVIC CAVITY:  No ascites  No pneumoperitoneum  No lymphadenopathy  VESSELS:  No evidence of abdominal aortic aneurysm  Peripherally calcified 1 8 cm left splenic artery aneurysm  PELVIS REPRODUCTIVE ORGANS:  Unremarkable for patient's age  URINARY BLADDER: Underdistended, limiting its evaluation  ABDOMINAL WALL/INGUINAL REGIONS: Small to moderate fat-containing left inguinal hernia  Small fat-containing umbilical hernia  OSSEOUS STRUCTURES:  No acute fracture or destructive osseous lesion  Spinal degenerative changes are noted  Impression: 1  No pulmonary embolism  2  Moderate to large right pleural effusion  3  Circumferential mural thickening of the esophagus, which may relate to esophagitis  4  Stable 1 6 cm arterially hyperenhancing pancreatic tail lesion, 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  Recommend further evaluation with nonemergent contrast-enhanced MRI abdomen  The study was marked in [facility] for immediate notification  Workstation performed: [id number]     I reviewed the above laboratory and imaging data  Discussion/Summary: 70-year-old male with what appears to be a neuroendocrine tumor in the tail of the pancreas  This is under 2 cm in size    I discussed with him and his caregiver that this is unlikely to metastasize based on the size  I did discuss potentially obtaining an endoscopic ultrasound, but I would recommend this if this would increase in size  There is thickening of the esophagus on his CT  I would recommend that he see GI for endoscopy, and if they feel that EUS is warranted it can be performed  The splenic artery aneurysm is 1 8 cm on the imaging here and over 2 cm on the outside imaging  Because of this increase in size and that it is over 2 cm I recommended that he see vascular surgery to see if any intervention should be entertained  I will see him again in 6 months with a repeat CT to make sure the neuroendocrine tumor is stable  He is agreeable to this  All his questions were answered

## 2023-08-08 ENCOUNTER — TELEPHONE (OUTPATIENT)
Dept: GASTROENTEROLOGY | Facility: MEDICAL CENTER | Age: 58
End: 2023-08-08

## 2023-08-08 ENCOUNTER — CONSULT (OUTPATIENT)
Dept: GASTROENTEROLOGY | Facility: MEDICAL CENTER | Age: 58
End: 2023-08-08

## 2023-08-08 VITALS
WEIGHT: 238 LBS | TEMPERATURE: 95.9 F | HEART RATE: 71 BPM | SYSTOLIC BLOOD PRESSURE: 124 MMHG | DIASTOLIC BLOOD PRESSURE: 78 MMHG | BODY MASS INDEX: 38.41 KG/M2

## 2023-08-08 DIAGNOSIS — R13.19 OTHER DYSPHAGIA: ICD-10-CM

## 2023-08-08 DIAGNOSIS — R10.84 GENERALIZED ABDOMINAL PAIN: Primary | ICD-10-CM

## 2023-08-08 DIAGNOSIS — K21.00 GASTROESOPHAGEAL REFLUX DISEASE WITH ESOPHAGITIS WITHOUT HEMORRHAGE: ICD-10-CM

## 2023-08-08 DIAGNOSIS — K52.9 ILEITIS: ICD-10-CM

## 2023-08-08 DIAGNOSIS — K86.9 PANCREATIC LESION: ICD-10-CM

## 2023-08-08 DIAGNOSIS — Z12.11 COLON CANCER SCREENING: ICD-10-CM

## 2023-08-08 RX ORDER — DICYCLOMINE HYDROCHLORIDE 10 MG/1
10 CAPSULE ORAL 4 TIMES DAILY PRN
Qty: 60 CAPSULE | Refills: 2 | Status: SHIPPED | OUTPATIENT
Start: 2023-08-08

## 2023-08-08 RX ORDER — BISACODYL 5 MG/1
TABLET, DELAYED RELEASE ORAL
Qty: 2 TABLET | Refills: 0 | Status: SHIPPED | OUTPATIENT
Start: 2023-08-08

## 2023-08-08 RX ORDER — OMEPRAZOLE 40 MG/1
40 CAPSULE, DELAYED RELEASE ORAL DAILY
Qty: 30 CAPSULE | Refills: 3 | Status: SHIPPED | OUTPATIENT
Start: 2023-08-08

## 2023-08-08 NOTE — PROGRESS NOTES
[facility] - Outpatient Consultation  Lenard Vallecillo 66 y.o. male MRN: [id number]  Encounter: [id number]    Assessment and Plan    1. Chronic diarrhea  2. Partial small bowel obstruction  The patient recently presented to the hospital secondary to nasal congestion. He had a CT scan obtained which revealed small bowel obstruction with transition point in the right lower quadrant and question of ileitis. He had a follow-up CT scan the next day revealing scattered areas of small bowel wall thickening more concerning for enteritis. The patient was seen and evaluated by surgery and secondary to continued flatus and bowel movements it was thought that he more likely had enteritis as opposed to SBO. At the time the patient was having diarrhea however he states he has had this for a number of years. This is associated with generalized abdominal cramping. He has recently had some improvement with a dairy free diet.  -The patient possibly had a viral or bacterial gastroenteritis however given his chronic diarrhea and questionable SBO with ileitis recommend ruling out inflammatory bowel disease  -Obtain inflammatory markers including C-reactive protein and fecal calprotectin  -Recommend colonoscopy with random colon and terminal ileum biopsies for further evaluation  -Start dicyclomine as needed  -Continue dairy free diet    3. GERD with esophagitis  4. Dysphagia  Diffuse esophageal wall thickening seen on multiple recent CT scans. The patient states that he does have uncontrolled acid reflux despite PPI therapy as well as dysphagia where he feels as though solid foods get stuck in his supra clavicular notch area. -Stop pantoprazole, start omeprazole 40 mg once daily  -Antireflux diet  -EGD for further evaluation    5.  Pancreatic lesion  Imaging revealing a 1.6 cm arterially hyperenhancing pancreatic tail lesion, 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  -MRI for further evaluation    6. Splenic artery aneurysm   Seen on CT, pending vascular referral     Follow-up after EGD and colonoscopy    ______________________________________________________________________    History of Present Illness  Christian Jacinto is a 66 y.o. male with mild cognitive impairment, bipolar disorder, seizures and hypertension here for consultation of a recent hospital visit. The patient presented with nasal congestion and CT scan was concerning for multiple GI findings. CT scan revealed a small bowel obstruction with a transition point in the right lower quadrant, hyperattenuating structure in the pancreatic tail, splenic artery aneurysm and diffuse esophageal wall thickening. At the time the patient had no abdominal pain and he was passing flatus and bowel movement so suspected that he had enteritis as opposed to small bowel obstruction. CT scan was repeated the following day and revealed contrast into the colon and scattered areas of small bowel wall thickening consistent with nonspecific enteritis. The patient has had further CT scan since that time with no further signs of enteritis but a 1.6 cm pancreatic tail lesion concerning for neuroendocrine tumor and continued esophagitis. Review of Systems   Constitutional: Negative for activity change, appetite change, chills, fatigue, fever and unexpected weight change. Gastrointestinal: Positive for diarrhea. Negative for abdominal distention, abdominal pain, anal bleeding, blood in stool, constipation, nausea, rectal pain and vomiting. Musculoskeletal: Negative for back pain and gait problem. Psychiatric/Behavioral: Negative for confusion. Past Medical History  No past medical history on file.     Past Social history  Past Surgical History:   Procedure Laterality Date   • IR THORACENTESIS  5/25/2023     Social History     Socioeconomic History   • Marital status: Single     Spouse name: Not on file   • Number of children: Not on file • Years of education: Not on file   • Highest education level: Not on file   Occupational History   • Not on file   Tobacco Use   • Smoking status: Never   • Smokeless tobacco: Never   Substance and Sexual Activity   • Alcohol use: Not Currently   • Drug use: Not Currently   • Sexual activity: Not on file   Other Topics Concern   • Not on file   Social History Narrative   • Not on file     Social Determinants of Health     Financial Resource Strain: Not on file   Food Insecurity: No Food Insecurity (5/26/2023)    Hunger Vital Sign    • Worried About Running Out of Food in the Last Year: Never true    • Ran Out of Food in the Last Year: Never true   Transportation Needs: No Transportation Needs (5/26/2023)    PRAPARE - Transportation    • Lack of Transportation (Medical): No    • Lack of Transportation (Non-Medical): No   Physical Activity: Not on file   Stress: Not on file   Social Connections: Not on file   Intimate Partner Violence: Not on file   Housing Stability: Low Risk  (5/26/2023)    Housing Stability Vital Sign    • Unable to Pay for Housing in the Last Year: No    • Number of Places Lived in the Last Year: 1    • Unstable Housing in the Last Year: No     Social History     Substance and Sexual Activity   Alcohol Use Not Currently     Social History     Substance and Sexual Activity   Drug Use Not Currently     Social History     Tobacco Use   Smoking Status Never   Smokeless Tobacco Never       Past Family History  No family history on file.     Current Medications  Current Outpatient Medications   Medication Sig Dispense Refill   • allopurinol (ZYLOPRIM) 100 mg tablet Take 100 mg by mouth daily     • Colchicine 0.6 MG CAPS Take 0.6 mg by mouth daily     • diphenhydrAMINE (BENADRYL) 50 mg capsule Take 50 mg by mouth every 6 (six) hours as needed     • diphenoxylate-atropine (LOMOTIL) 2.5-0.025 mg per tablet Take 2 tablets by mouth 2 (two) times a day as needed     • divalproex sodium (DEPAKOTE) 500 mg  tablet Take 500 mg by mouth 2 (two) times a day     • EPINEPHrine (EPIPEN) 0.3 mg/0.3 mL SOAJ Inject 0.3 mg into a muscle     • escitalopram (LEXAPRO) 20 mg tablet Take 20 mg by mouth daily     • furosemide (LASIX) 20 mg tablet Take 1 tablet by mouth daily as needed     • ibuprofen (MOTRIN) 400 mg tablet TAKE ONE TABLET BY MOUTH EVERY 6 HOURS AS NEEDED FOR FEVER, MILD PAIN, OR HEADACHES     • losartan (COZAAR) 100 MG tablet Take 25 mg by mouth daily     • losartan (COZAAR) 100 MG tablet Take 100 mg by mouth daily     • menthol-zinc oxide (CALMOSEPTINE) 0.44-20.6 % OINT Apply topically     • montelukast (SINGULAIR) 10 mg tablet Take 10 mg by mouth     • omeprazole (PriLOSEC) 20 mg delayed release capsule      • pantoprazole (PROTONIX) 40 mg tablet Take 1 tablet (40 mg total) by mouth daily in the early morning for 28 days Do not start before May 30, 2023. 28 tablet 0   • polyethylene glycol (GLYCOLAX) 17 GM/SCOOP powder Take 17 g by mouth     • risperiDONE (RisperDAL) 0.5 mg tablet Take 0.5 mg by mouth 2 (two) times a day     • risperiDONE (RisperDAL) 1 mg tablet Take 1 mg by mouth 2 (two) times a day     • senna (SENOKOT) 8.6 mg Take 2 tablets (17.2 mg total) by mouth daily at bedtime as needed for constipation 60 tablet 0   • sucralfate (CARAFATE) 1 g tablet Take 1 tablet (1 g total) by mouth 4 (four) times a day 60 tablet 0   • tamsulosin (FLOMAX) 0.4 mg Take 0.8 mg by mouth       No current facility-administered medications for this visit. Allergies  Allergies   Allergen Reactions   • Bee Venom Anaphylaxis, Hives and Swelling         The following portions of the patient's history were reviewed and updated as appropriate: allergies, current medications, past medical history, past social history, past surgical history and problem list.      Vitals  There were no vitals filed for this visit.       Physical Exam  Constitutional   General appearance: Patient is seated and in no acute distress, well appearing and well nourished. Head and Face   Head and face: Normal.    Eyes   Conjunctiva and lids: No erythema, swelling or discharge. Anicteric. Ears, Nose, Mouth, and Throat   Hearing: Normal.    Neck: Supple, trachea midline. Pulmonary   Respiratory effort: No increased work of breathing or signs of respiratory distress. Lungs: Clear to ascultation, no wheezes, rhonchi, or rales  Cardiovascular   Heart: Regular rate and rhythm, no murmurs gallops or rubs   Examination of extremities for edema and/or varicosities: Normal.    Musculoskeletal   Gait and station: Normal   Skin   Skin and subcutaneous tissue: Warm, dry, and intact. No visible jaundice, lesions or rashes. Psychiatric   Judgment and insight: Normal  Recent and remote memory:  Normal  Mood and affect: Normal      Results  No visits with results within 1 Day(s) from this visit.    Latest known visit with results is:   Admission on 06/03/2023, Discharged on 06/03/2023   Component Date Value   • Ventricular Rate 06/03/2023 65    • Atrial Rate 06/03/2023 65    • VT Interval 06/03/2023 132    • QRSD Interval 06/03/2023 102    • QT Interval 06/03/2023 408    • QTC Interval 06/03/2023 424    • P Axis 06/03/2023 25    • QRS Axis 06/03/2023 8    • T Wave Axis 06/03/2023 43    • WBC 06/03/2023 4.68    • RBC 06/03/2023 3.45 (L)    • Hemoglobin 06/03/2023 10.5 (L)    • Hematocrit 06/03/2023 33.8 (L)    • MCV 06/03/2023 98    • MCH 06/03/2023 30.4    • MCHC 06/03/2023 31.1 (L)    • RDW 06/03/2023 13.3    • MPV 06/03/2023 10.3    • Platelets 85/67/4658 278    • nRBC 06/03/2023 0    • Neutrophils Relative 06/03/2023 55    • Immat GRANS % 06/03/2023 0    • Lymphocytes Relative 06/03/2023 27    • Monocytes Relative 06/03/2023 14 (H)    • Eosinophils Relative 06/03/2023 3    • Basophils Relative 06/03/2023 1    • Neutrophils Absolute 06/03/2023 2.58    • Immature Grans Absolute 06/03/2023 0.02    • Lymphocytes Absolute 06/03/2023 1.25    • Monocytes Absolute 06/03/2023 0.64 • Eosinophils Absolute 06/03/2023 0.15    • Basophils Absolute 06/03/2023 0.04    • Sodium 06/03/2023 136    • Potassium 06/03/2023 4.7    • Chloride 06/03/2023 105    • CO2 06/03/2023 27    • ANION GAP 06/03/2023 4    • BUN 06/03/2023 17    • Creatinine 06/03/2023 0.89    • Glucose 06/03/2023 100    • Calcium 06/03/2023 8.2 (L)    • Corrected Calcium 06/03/2023 8.9    • AST 06/03/2023 16    • ALT 06/03/2023 7    • Alkaline Phosphatase 06/03/2023 49    • Total Protein 06/03/2023 6.1 (L)    • Albumin 06/03/2023 3.1 (L)    • Total Bilirubin 06/03/2023 0.32    • eGFR 06/03/2023 95    • Lipase 06/03/2023 14    • hs TnI 0hr 06/03/2023 3    • hs TnI 2hr 06/03/2023 3    • Delta 2hr hsTnI 06/03/2023 0    • Ventricular Rate 06/03/2023 56    • Atrial Rate 06/03/2023 55    • QRSD Interval 06/03/2023 90    • QT Interval 06/03/2023 422    • QTC Interval 06/03/2023 407    • P Axis 06/03/2023 39    • QRS Axis 06/03/2023 -49    • T Wave [place] 06/03/2023 48        Radiology Results  CT CHEST ABDOMEN AND PELVIS W CONTRAST    Result Date: 7/21/2023  Narrative: CT chest, abdomen, and pelvis Clinical history: Chest pain, abdominal pain, pleural effusion Technique: CT study of the chest, abdomen, and pelvis is performed with intravenous contrast. 2.5 mm axial images are obtained from the thoracic inlet to the ischial tuberosities. 90 cc of Omnipaque 350 was administered intravenously. Comparison: Comparison is made to most recent chest CT performed 06/06/2023. Chest Mediastinum/Laurence/Axilla: Examination of the chest demonstrates scattered small  non pathologically enlarged based on a lymph nodes. No pathologically enlarged hilar or axillary adenopathy is evident. Tiny hiatus hernia is suggested. Pleura: There is a moderate layering right pleural effusion slightly decreased when compared to most recent chest CT. There may be very minimal left pleural fluid. Heart/great vessels: The heart is mildly enlarged.  The aorta is nonaneurysmal. Chest wall/diaphragm: No acute chest wall abnormality is seen. Lungs: Lung centered windows fail to demonstrate focal consolidation or mass. There is partial right lower lobe and partial right middle lobe atelectasis. There is no overt failure. A left lower lobe pulmonary nodule measuring 12 mm is again noted. This is partially calcified which may represent a pulmonary hamartoma. Abdomen Liver: The liver is normal size. The liver is homogeneous in attenuation. No perihepatic fluid or intrahepatic biliary duct dilation is evident. Spleen/Pancreas/Adrenal glands: The spleen is not enlarged. Again a small splenic artery aneurysm is noted. The pancreas, and adrenal glands are unremarkable. Gallbladder: The gallbladder is nondistended contains tiny gallstones. Kidneys: The kidneys are nonobstructed. There is minimal nonspecific perinephric stranding. No renal or ureteral calculus is seen. Bowel: There is no antegrade bowel obstruction. Small amount of stool is noted within the colon. There is a ventral midline hernia containing mesenteric fat. The appendix is visualized. There is no evidence of appendicitis. Aorta: The abdominal aorta is normal in caliber. Lymph nodes: No pathologically enlarged upper abdominal or retroperitoneal lymphadenopathy is seen. Pelvis General: No pelvic mass or fluid collection is evident. Bladder: The bladder is nondistended. Bowel: There is no evidence of sigmoid diverticulitis. Reproductive: The prostate gland is not enlarged. Adenopathy: No pathologically enlarged pelvic or inguinal lymphadenopathy is seen. Groin : There is a fat-containing left inguinal hernia. Bones: Bone windows fail to demonstrate evidence of acute abnormality. There is mild degenerative facet hypertrophy in the lumbar spine. Impression: Impression: 1. No acute abnormality in the chest. Moderate layering right pleural effusion with partial atelectasis of the right middle and lower lobes. No overt failure. Similar-appearing partially calcified well-circumscribed left lower lobe pulmonary nodule measuring approximately 12 mm may represent a pulmonary hamartoma. 2. No acute abdominal or pelvic pathology. No antegrade bowel obstruction. No evidence of appendicitis or diverticulitis. 3. Tiny gallstones without findings to suggest cholecystitis. 4. Other findings as above. Workstation:[id number]    XR chest pa & lateral    Result Date: 7/21/2023  Narrative: Chest AP and lateral Indication: Shortness of breath. Chest pain. Two-view study was done and compared to 5/24/2023 and 4/7/2023. There is a moderate right pleural effusion which is smaller than on the most recent chest x-ray. This effusion may be partially loculated. There is improvement but incompletely resolved right basilar compressive atelectasis. Left lung is clear. There is blunting of the left costophrenic angle on the lateral view suggesting a small left pleural effusion. Heart is mildly enlarged. Impression: Impression: Decrease in size of a now moderate right pleural effusion which may be partially loculated. Improvement in right basilar compressive atelectasis. Small left pleural effusion. Cardiomegaly. Workstation:[id number]      Orders  No orders of the defined types were placed in this encounter.

## 2023-08-08 NOTE — PATIENT INSTRUCTIONS
Stop pantoprazole, start omeprazole 40mg once 30-60 minutes before breakfast daily for GERD and esophagitis  Start bentyl 10mg four times daily as needed for abdominal pain  Continue lactose free diet  MRI for further evaluation of pancreatic nodule  EGD and colonoscopy for further evaluation of esophagitis, diarrhea, and recent SBO vs enteritis   Vascular referral for splenic artery aneurysm

## 2023-08-08 NOTE — TELEPHONE ENCOUNTER
I called and spoke with Phil at patient's care home. Provided contact number for MRI scheduling. Scheduled appointments for patient, Colon/EGD moved to 9/12/23 at [address], therefore [address] [facility] no longer needed. Mailed prep to patient's care facility.

## 2023-08-23 ENCOUNTER — HOSPITAL ENCOUNTER (OUTPATIENT)
Dept: NUCLEAR MEDICINE | Facility: HOSPITAL | Age: 58
Discharge: HOME | End: 2023-08-23
Payer: MEDICARE

## 2023-08-23 ENCOUNTER — HOSPITAL ENCOUNTER (OUTPATIENT)
Dept: NON INVASIVE DIAGNOSTICS | Facility: HOSPITAL | Age: 58
Discharge: HOME | End: 2023-08-23
Payer: MEDICARE

## 2023-08-23 VITALS — WEIGHT: 238 LBS | BODY MASS INDEX: 38.25 KG/M2 | HEIGHT: 66 IN

## 2023-08-23 DIAGNOSIS — J90 PLEURAL EFFUSION: ICD-10-CM

## 2023-08-23 LAB
CHEST PAIN STATEMENT: NORMAL
MAX DIASTOLIC BP: 79 MMHG
MAX HEART RATE: 84 BPM
MAX PREDICTED HEART RATE: 164 BPM
MAX. SYSTOLIC BP: 173 MMHG
NUC STRESS EJECTION FRACTION: 61 %
PROTOCOL NAME: NORMAL
RATE PRESSURE PRODUCT: NORMAL
REASON FOR TERMINATION: NORMAL
SL CV REST NUCLEAR ISOTOPE DOSE: 10.7 MCI
SL CV STRESS NUCLEAR ISOTOPE DOSE: 33 MCI
SL CV STRESS RECOVERY BP: NORMAL MMHG
SL CV STRESS RECOVERY HR: 75 BPM
SL CV STRESS RECOVERY O2 SAT: 97 %
STRESS ANGINA INDEX: 0
STRESS BASELINE BP: NORMAL MMHG
STRESS BASELINE HR: 50 BPM
STRESS O2 SAT REST: 98 %
STRESS PEAK HR: 84 BPM
STRESS POST O2 SAT PEAK: 99 %
STRESS POST PEAK BP: 152 MMHG
TARGET HR FORMULA: NORMAL
TEST INDICATION: NORMAL
TIME IN EXERCISE PHASE: NORMAL

## 2023-08-23 PROCEDURE — G1004 CDSM NDSC: HCPCS

## 2023-08-23 PROCEDURE — 93017 CV STRESS TEST TRACING ONLY: CPT

## 2023-08-23 PROCEDURE — 78452 HT MUSCLE IMAGE SPECT MULT: CPT

## 2023-08-23 PROCEDURE — 93018 CV STRESS TEST I&R ONLY: CPT | Performed by: INTERNAL MEDICINE

## 2023-08-23 PROCEDURE — 93016 CV STRESS TEST SUPVJ ONLY: CPT | Performed by: INTERNAL MEDICINE

## 2023-08-23 PROCEDURE — A9502 TC99M TETROFOSMIN: HCPCS

## 2023-08-23 RX ORDER — REGADENOSON 0.08 MG/ML
0.4 INJECTION, SOLUTION INTRAVENOUS ONCE
Status: DISCONTINUED | OUTPATIENT
Start: 2023-08-23 | End: 2023-08-24 | Stop reason: HOSPADM

## 2023-08-25 LAB
CHEST PAIN STATEMENT: NORMAL
MAX DIASTOLIC BP: 79 MMHG
MAX HEART RATE: 84 BPM
MAX PREDICTED HEART RATE: 164 BPM
MAX. SYSTOLIC BP: 173 MMHG
PROTOCOL NAME: NORMAL
REASON FOR TERMINATION: NORMAL
TARGET HR FORMULA: NORMAL
TEST INDICATION: NORMAL
TIME IN EXERCISE PHASE: NORMAL

## 2023-08-30 ENCOUNTER — OFFICE VISIT (OUTPATIENT)
Dept: VASCULAR SURGERY | Facility: CLINIC | Age: 58
End: 2023-08-30
Payer: MEDICARE

## 2023-08-30 VITALS
WEIGHT: 245.8 LBS | BODY MASS INDEX: 39.5 KG/M2 | HEART RATE: 53 BPM | SYSTOLIC BLOOD PRESSURE: 146 MMHG | HEIGHT: 66 IN | OXYGEN SATURATION: 98 % | DIASTOLIC BLOOD PRESSURE: 84 MMHG

## 2023-08-30 DIAGNOSIS — F79 MENTAL IMPAIRMENT: ICD-10-CM

## 2023-08-30 DIAGNOSIS — I72.8 SPLENIC ARTERY ANEURYSM (HCC): Primary | ICD-10-CM

## 2023-08-30 DIAGNOSIS — R60.0 BILATERAL LOWER EXTREMITY EDEMA: ICD-10-CM

## 2023-08-30 PROCEDURE — 99204 OFFICE O/P NEW MOD 45 MIN: CPT | Performed by: SURGERY

## 2023-08-30 NOTE — PATIENT INSTRUCTIONS
1) Splenic artery aneurysm  -you have an aneurysm, or enlargemetn of the artery, that goes to the spleen  -this is 18mm in size; we only consider surgery for this once it reaches 30mm in size  -we are going to monitor this with ultrasound on a yearly basis to watch the size    2) Medications  -we generally prescribe aspirin and a statin medication for aneurysms but in light of his other health issues which are still being worked up, I will not start these at this time    3) Leg swelling  -we need to reduce the swelling in your legs to prevent wounds and pain  -please wear compression socks daily and remove at night for sleep; if buying online, look for "gradient compression" with a weight of 20-30mmHg, knee high, any fabric or brand is okay  Try "[employer]" for compression  -elevate your legs as much as possible and avoid sitting with legs down  -try to increase your activity/exercise level and do some walking every day  -weight loss will also reduce your swelling

## 2023-08-30 NOTE — PROGRESS NOTES
Assessment/Plan:    Pt is a 67 yo M w/ hx SBO, partial GERD, asthma, s/p ?umbilical hernia repair?, LUPIS, HTN, pleural effusion s/p thoracentesis, gout arthritis, BPH, bipolar disorder, mental impairment, morbid obesity, anemia, pancreatic lesion, esophageal thickening, splenic artery aneurysm    Splenic artery aneurysm (HCC)  -     VAS celiac and/or mesenteric duplex; complete study; Future  -reviewed CT scans from May and June of this year which shows a 1.8cm splenic artery aneurysm w/ calcified ring  -discussed aneurysmal disease and indications for surgical treatment including size >3cm  -will continue surveillance for this on a yearly basis    GERD  Esophageal thickening  -being worked up by GI and planned for colonoscopy    Pancreatic lesion  -being worked up by Dr. Sally Huston  -planned fro MRI  -based on current imaging, favored to be a neuroendocrine tumor w/ plan for surveillance given small size  -if this required resection at some point in the future, would likely resect the aneurysm at the same time    Mental impairment  -patient is accompanied by his group home leader     Subjective:      Patient ID: Faiza Hernandez is a 66 y.o. male. Patient is here today to review results of a CTA chest/abd/pelvis done 6/3/2023. Patient denies any nausea or vomiting or any pain in his left shoulder. Patient occasionally c/o upper quadrant abd pain. He is a non-smoker. HPI:    Patient referred for splenic artery aneurysm. Patient denies abdominal pain currently. Denies N/V. Denies trouble eating or having bowel movements    He has also seen Dr. Sally Huston for a mass in the pancreatic tail favored as neuroendocrine tumor that will be imaged with MRI upcoming    He is also seeing GI for esophageal thickening and GERD and is planned for colonoscopy. Had a recent partial SBO. Complains of SOB. Had thoracentesis in May. Complains of CP after eating. Had negative stress test last week. He has BLE edema.   He walks minimally and mostly sits in a chair. Patient lives in a group home and comes today with his roommate and "". Never smoker. The following portions of the patient's history were reviewed and updated as appropriate: allergies, current medications, past family history, past medical history, past social history, past surgical history and problem list.    Review of Systems   Constitutional: Negative. HENT: Negative. Eyes: Negative. Respiratory: Positive for shortness of breath. Cardiovascular: Positive for chest pain (after eating). Gastrointestinal: Positive for abdominal pain. Negative for nausea and vomiting. Endocrine: Negative. Genitourinary: Negative. Musculoskeletal: Negative. Skin: Negative. Negative for wound. Allergic/Immunologic: Positive for environmental allergies (bees). Neurological: Positive for dizziness. Hematological: Negative. Psychiatric/Behavioral: Negative. Objective:      /84 (BP Location: Left arm, Patient Position: Sitting, Cuff Size: Large)   Pulse (!) 53   Ht 5' 6" (1.676 m)   Wt 111 kg (245 lb 12.8 oz)   SpO2 98%   BMI 39.67 kg/m²          Physical Exam  Cardiovascular:      Rate and Rhythm: Normal rate and regular rhythm. Pulses:           Radial pulses are 2+ on the right side and 2+ on the left side. Dorsalis pedis pulses are 2+ on the right side and 2+ on the left side. Posterior tibial pulses are 2+ on the right side and 2+ on the left side. Heart sounds: No murmur heard. Pulmonary:      Effort: No respiratory distress. Breath sounds: No wheezing or rales. Abdominal:      Palpations: Abdomen is soft. Tenderness: There is abdominal tenderness (very mild midepigastric tenderness). Musculoskeletal:      Right lower le+ Edema present. Left lower le+ Edema present.    Skin:     Comments: Mild stasis changes with hair loss but without wounds; no prominent veins I have reviewed and made appropriate changes to the review of systems input by the medical assistant. Vitals:    08/30/23 1550   BP: 146/84   BP Location: Left arm   Patient Position: Sitting   Cuff Size: Large   Pulse: (!) 53   SpO2: 98%   Weight: 111 kg (245 lb 12.8 oz)   Height: 5' 6" (1.676 m)       Patient Active Problem List   Diagnosis   • Bipolar disorder ([address])   • BPH (benign prostatic hyperplasia)   • Cardiomegaly   • Chronic diarrhea   • Essential hypertension   • Gouty arthritis of right great toe   • Mental impairment   • Mild persistent asthma without complication   • Morbid obesity (HCC)   • Normocytic anemia   • LUPIS (obstructive sleep apnea)   • Recurrent pleural effusion on right   • Small bowel obstruction, partial (HCC)   • Esophagitis   • Pancreatic lesion   • Aspiration pneumonitis (HCC)   • Acute anemia       Past Surgical History:   Procedure Laterality Date   • IR THORACENTESIS  5/25/2023       History reviewed. No pertinent family history. Social History     Socioeconomic History   • Marital status: Single     Spouse name: Not on file   • Number of children: Not on file   • Years of education: Not on file   • Highest education level: Not on file   Occupational History   • Not on file   Tobacco Use   • Smoking status: Never   • Smokeless tobacco: Never   Substance and Sexual Activity   • Alcohol use: Not Currently   • Drug use: Not Currently   • Sexual activity: Not on file   Other Topics Concern   • Not on file   Social History Narrative   • Not on file     Social Determinants of Health     Financial Resource Strain: Not on file   Food Insecurity: No Food Insecurity (5/26/2023)    Hunger Vital Sign    • Worried About Running Out of Food in the Last Year: Never true    • Ran Out of Food in the Last Year: Never true   Transportation Needs: No Transportation Needs (5/26/2023)    PRAPARE - Transportation    • Lack of Transportation (Medical):  No    • Lack of Transportation (Non-Medical):  No   Physical Activity: Not on file   Stress: Not on file   Social Connections: Not on file   Intimate Partner Violence: Not on file   Housing Stability: Low Risk  (5/26/2023)    Housing Stability Vital Sign    • Unable to Pay for Housing in the Last Year: No    • Number of Places Lived in the Last Year: 1    • Unstable Housing in the Last Year: No       Allergies   Allergen Reactions   • Bee Venom Anaphylaxis, Hives and Swelling         Current Outpatient Medications:   •  allopurinol (ZYLOPRIM) 100 mg tablet, Take 100 mg by mouth daily, Disp: , Rfl:   •  Colchicine 0.6 MG CAPS, Take 0.6 mg by mouth daily, Disp: , Rfl:   •  diphenhydrAMINE (BENADRYL) 50 mg capsule, Take 50 mg by mouth every 6 (six) hours as needed, Disp: , Rfl:   •  diphenoxylate-atropine (LOMOTIL) 2.5-0.025 mg per tablet, Take 2 tablets by mouth 2 (two) times a day as needed, Disp: , Rfl:   •  divalproex sodium (DEPAKOTE) 500 mg DR tablet, Take 500 mg by mouth 2 (two) times a day, Disp: , Rfl:   •  escitalopram (LEXAPRO) 20 mg tablet, Take 20 mg by mouth daily, Disp: , Rfl:   •  furosemide (LASIX) 20 mg tablet, Take 1 tablet by mouth daily as needed, Disp: , Rfl:   •  ibuprofen (MOTRIN) 400 mg tablet, Take 600 mg by mouth every 6 (six) hours as needed, Disp: , Rfl:   •  losartan (COZAAR) 100 MG tablet, Take 100 mg by mouth daily, Disp: , Rfl:   •  menthol-zinc oxide (CALMOSEPTINE) 0.44-20.6 % OINT, Apply topically, Disp: , Rfl:   •  montelukast (SINGULAIR) 10 mg tablet, Take 10 mg by mouth, Disp: , Rfl:   •  risperiDONE (RisperDAL) 0.5 mg tablet, Take 0.5 mg by mouth 2 (two) times a day, Disp: , Rfl:   •  risperiDONE (RisperDAL) 1 mg tablet, Take 1 mg by mouth 2 (two) times a day, Disp: , Rfl:   •  bisacodyl (DULCOLAX) 5 mg EC tablet, Take as directed as per written office instructions (Patient not taking: Reported on 8/30/2023), Disp: 2 tablet, Rfl: 0  •  dicyclomine (BENTYL) 10 mg capsule, Take 1 capsule (10 mg total) by mouth 4 (four) times a day as needed (abdominal pain) (Patient not taking: Reported on 8/30/2023), Disp: 60 capsule, Rfl: 2  •  EPINEPHrine (EPIPEN) 0.3 mg/0.3 mL SOAJ, Inject 0.3 mg into a muscle (Patient not taking: Reported on 8/30/2023), Disp: , Rfl:   •  losartan (COZAAR) 100 MG tablet, Take 25 mg by mouth daily (Patient not taking: Reported on 8/30/2023), Disp: , Rfl:   •  omeprazole (PriLOSEC) 40 MG capsule, Take 1 capsule (40 mg total) by mouth daily (Patient not taking: Reported on 8/30/2023), Disp: 30 capsule, Rfl: 3  •  polyethylene glycol (GLYCOLAX) 17 GM/SCOOP powder, Take 17 g by mouth (Patient not taking: Reported on 8/30/2023), Disp: , Rfl:   •  polyethylene glycol (GOLYTELY) 4000 mL solution, Take 4,000 mL by mouth once for 1 dose, Disp: 4000 mL, Rfl: 0  •  senna (SENOKOT) 8.6 mg, Take 2 tablets (17.2 mg total) by mouth daily at bedtime as needed for constipation, Disp: 60 tablet, Rfl: 0  •  sucralfate (CARAFATE) 1 g tablet, Take 1 tablet (1 g total) by mouth 4 (four) times a day (Patient not taking: Reported on 8/30/2023), Disp: 60 tablet, Rfl: 0  •  tamsulosin (FLOMAX) 0.4 mg, Take 0.8 mg by mouth (Patient not taking: Reported on 8/30/2023), Disp: , Rfl:

## 2023-09-05 ENCOUNTER — HOSPITAL ENCOUNTER (OUTPATIENT)
Dept: MRI IMAGING | Facility: HOSPITAL | Age: 58
Discharge: HOME | End: 2023-09-05
Payer: MEDICARE

## 2023-09-05 DIAGNOSIS — K86.9 PANCREATIC LESION: ICD-10-CM

## 2023-09-05 PROCEDURE — 74181 MRI ABDOMEN W/O CONTRAST: CPT

## 2023-09-05 PROCEDURE — G1004 CDSM NDSC: HCPCS

## 2023-09-12 ENCOUNTER — HOSPITAL ENCOUNTER (OUTPATIENT)
Dept: GASTROENTEROLOGY | Facility: HOSPITAL | Age: 58
Setting detail: OUTPATIENT SURGERY
Discharge: HOME | End: 2023-09-12
Attending: INTERNAL MEDICINE | Admitting: INTERNAL MEDICINE
Payer: MEDICARE

## 2023-09-12 ENCOUNTER — ANESTHESIA EVENT (OUTPATIENT)
Dept: GASTROENTEROLOGY | Facility: HOSPITAL | Age: 58
End: 2023-09-12

## 2023-09-12 ENCOUNTER — ANESTHESIA (OUTPATIENT)
Dept: GASTROENTEROLOGY | Facility: HOSPITAL | Age: 58
End: 2023-09-12

## 2023-09-12 VITALS
TEMPERATURE: 97.8 F | SYSTOLIC BLOOD PRESSURE: 197 MMHG | DIASTOLIC BLOOD PRESSURE: 91 MMHG | HEART RATE: 65 BPM | WEIGHT: 240 LBS | RESPIRATION RATE: 18 BRPM | OXYGEN SATURATION: 98 % | HEIGHT: 66 IN | BODY MASS INDEX: 38.57 KG/M2

## 2023-09-12 DIAGNOSIS — R13.19 OTHER DYSPHAGIA: ICD-10-CM

## 2023-09-12 DIAGNOSIS — Z12.11 COLON CANCER SCREENING: ICD-10-CM

## 2023-09-12 DIAGNOSIS — K52.9 ILEITIS: ICD-10-CM

## 2023-09-12 PROCEDURE — 88305 TISSUE EXAM BY PATHOLOGIST: CPT | Performed by: STUDENT IN AN ORGANIZED HEALTH CARE EDUCATION/TRAINING PROGRAM

## 2023-09-12 RX ORDER — SODIUM CHLORIDE, SODIUM LACTATE, POTASSIUM CHLORIDE, CALCIUM CHLORIDE 600; 310; 30; 20 MG/100ML; MG/100ML; MG/100ML; MG/100ML
20 INJECTION, SOLUTION INTRAVENOUS CONTINUOUS
Status: CANCELLED | OUTPATIENT
Start: 2023-09-12

## 2023-09-12 RX ORDER — LIDOCAINE HYDROCHLORIDE 10 MG/ML
INJECTION, SOLUTION EPIDURAL; INFILTRATION; INTRACAUDAL; PERINEURAL AS NEEDED
Status: DISCONTINUED | OUTPATIENT
Start: 2023-09-12 | End: 2023-09-12

## 2023-09-12 RX ORDER — SODIUM CHLORIDE, SODIUM LACTATE, POTASSIUM CHLORIDE, CALCIUM CHLORIDE 600; 310; 30; 20 MG/100ML; MG/100ML; MG/100ML; MG/100ML
INJECTION, SOLUTION INTRAVENOUS CONTINUOUS PRN
Status: DISCONTINUED | OUTPATIENT
Start: 2023-09-12 | End: 2023-09-12

## 2023-09-12 RX ORDER — SODIUM CHLORIDE, SODIUM LACTATE, POTASSIUM CHLORIDE, CALCIUM CHLORIDE 600; 310; 30; 20 MG/100ML; MG/100ML; MG/100ML; MG/100ML
125 INJECTION, SOLUTION INTRAVENOUS CONTINUOUS
Status: DISCONTINUED | OUTPATIENT
Start: 2023-09-12 | End: 2023-09-16 | Stop reason: HOSPADM

## 2023-09-12 RX ORDER — SODIUM CHLORIDE, SODIUM LACTATE, POTASSIUM CHLORIDE, CALCIUM CHLORIDE 600; 310; 30; 20 MG/100ML; MG/100ML; MG/100ML; MG/100ML
75 INJECTION, SOLUTION INTRAVENOUS CONTINUOUS
Status: CANCELLED | OUTPATIENT
Start: 2023-09-12

## 2023-09-12 RX ORDER — PROPOFOL 10 MG/ML
INJECTION, EMULSION INTRAVENOUS AS NEEDED
Status: DISCONTINUED | OUTPATIENT
Start: 2023-09-12 | End: 2023-09-12

## 2023-09-12 RX ADMIN — PROPOFOL 30 MG: 10 INJECTION, EMULSION INTRAVENOUS at 13:56

## 2023-09-12 RX ADMIN — SODIUM CHLORIDE, SODIUM LACTATE, POTASSIUM CHLORIDE, AND CALCIUM CHLORIDE: .6; .31; .03; .02 INJECTION, SOLUTION INTRAVENOUS at 13:27

## 2023-09-12 RX ADMIN — PROPOFOL 150 MG: 10 INJECTION, EMULSION INTRAVENOUS at 13:34

## 2023-09-12 RX ADMIN — SODIUM CHLORIDE, SODIUM LACTATE, POTASSIUM CHLORIDE, AND CALCIUM CHLORIDE: .6; .31; .03; .02 INJECTION, SOLUTION INTRAVENOUS at 09:04

## 2023-09-12 RX ADMIN — PROPOFOL 20 MG: 10 INJECTION, EMULSION INTRAVENOUS at 14:03

## 2023-09-12 RX ADMIN — LIDOCAINE HYDROCHLORIDE 50 MG: 10 INJECTION, SOLUTION EPIDURAL; INFILTRATION; INTRACAUDAL at 13:34

## 2023-09-12 RX ADMIN — PROPOFOL 30 MG: 10 INJECTION, EMULSION INTRAVENOUS at 13:51

## 2023-09-12 RX ADMIN — PROPOFOL 30 MG: 10 INJECTION, EMULSION INTRAVENOUS at 13:45

## 2023-09-12 RX ADMIN — PROPOFOL 20 MG: 10 INJECTION, EMULSION INTRAVENOUS at 13:41

## 2023-09-12 NOTE — ANESTHESIA POSTPROCEDURE EVALUATION
Post-Op Assessment Note    CV Status:  Stable  Pain Score: 0    Pain management: adequate     Mental Status:  Alert and awake   Hydration Status:  Stable   PONV Controlled:  None   Airway Patency:  Patent      Post Op Vitals Reviewed: Yes      Staff: CRNA         No notable events documented.     BP     Temp     Pulse     Resp      SpO2

## 2023-09-12 NOTE — ANESTHESIA PREPROCEDURE EVALUATION
Procedure:  COLONOSCOPY  EGD    Relevant Problems   CARDIO   (+) Essential hypertension      GI/HEPATIC   (+) Pancreatic lesion   (+) Small bowel obstruction, partial (HCC)      /RENAL   (+) BPH (benign prostatic hyperplasia)      HEMATOLOGY   (+) Acute anemia   (+) Normocytic anemia      MUSCULOSKELETAL   (+) Gouty arthritis of right great toe      PULMONARY   (+) Mild persistent asthma without complication   (+) LUPIS (obstructive sleep apnea)   (+) Recurrent pleural effusion on right        Physical Exam    Airway    Mallampati score: II         Dental       Cardiovascular  Cardiovascular exam normal    Pulmonary  Pulmonary exam normal     Other Findings        Anesthesia Plan  ASA Score- 3     Anesthesia Type- IV sedation with anesthesia with ASA Monitors. Additional Monitors:   Airway Plan:           Plan Factors-Exercise tolerance (METS): >4 METS. Chart reviewed. EKG reviewed. Imaging results reviewed. Existing labs reviewed. Patient summary reviewed. Patient is not a current smoker. Patient not instructed to abstain from smoking on day of procedure. Induction-     Postoperative Plan-     Informed Consent- Anesthetic plan and risks discussed with patient. I personally reviewed this patient with the CRNA. Discussed and agreed on the Anesthesia Plan with the CRNA. Alberto Avery

## 2023-09-12 NOTE — H&P
History and Physical - [facility]  Santana Saavedra 66 y.o. male MRN: [id number]    HPI: Santana Saavedra is a 66y.o. year old male who presents for EGD (prior imaging showed esophageal wall thickening, also Hx of unctrled GERD despite PPI, and solid food dysphagia), and colonoscopy (chronic diarrhea, elevated CRP and fecal dima, Hx of partial SBO w/ ileitis on imaging)    REVIEW OF SYSTEMS: Per the HPI, and otherwise unremarkable. Historical Information   History reviewed. No pertinent past medical history. Past Surgical History:   Procedure Laterality Date   • IR THORACENTESIS  5/25/2023     Social History   Social History     Substance and Sexual Activity   Alcohol Use Not Currently     Social History     Substance and Sexual Activity   Drug Use Not Currently     Social History     Tobacco Use   Smoking Status Never   Smokeless Tobacco Never     History reviewed. No pertinent family history.     Meds/Allergies       Current Outpatient Medications:   •  allopurinol (ZYLOPRIM) 100 mg tablet  •  Colchicine 0.6 MG CAPS  •  divalproex sodium (DEPAKOTE) 500 mg DR tablet  •  escitalopram (LEXAPRO) 20 mg tablet  •  losartan (COZAAR) 100 MG tablet  •  risperiDONE (RisperDAL) 0.5 mg tablet  •  bisacodyl (DULCOLAX) 5 mg EC tablet  •  dicyclomine (BENTYL) 10 mg capsule  •  diphenhydrAMINE (BENADRYL) 50 mg capsule  •  diphenoxylate-atropine (LOMOTIL) 2.5-0.025 mg per tablet  •  EPINEPHrine (EPIPEN) 0.3 mg/0.3 mL SOAJ  •  furosemide (LASIX) 20 mg tablet  •  ibuprofen (MOTRIN) 400 mg tablet  •  losartan (COZAAR) 100 MG tablet  •  menthol-zinc oxide (CALMOSEPTINE) 0.44-20.6 % OINT  •  montelukast (SINGULAIR) 10 mg tablet  •  omeprazole (PriLOSEC) 40 MG capsule  •  polyethylene glycol (GLYCOLAX) 17 GM/SCOOP powder  •  polyethylene glycol (GOLYTELY) 4000 mL solution  •  risperiDONE (RisperDAL) 1 mg tablet  •  senna (SENOKOT) 8.6 mg  •  sucralfate (CARAFATE) 1 g tablet  •  tamsulosin (FLOMAX) 0.4 mg    Current Facility-Administered Medications:   •  lactated ringers infusion, 125 mL/hr, Intravenous, Continuous    Facility-Administered Medications Ordered in Other Encounters:   •  lactated ringers infusion, , Intravenous, Continuous PRN, New Bag at 09/12/23 9305    Allergies   Allergen Reactions   • Bee Venom Anaphylaxis, Hives and Swelling       Objective   BP (!) 183/95   Pulse 62   Temp (!) 97.3 °F (36.3 °C) (Temporal)   Resp 16   Ht 5' 6" (1.676 m)   Wt 109 kg (240 lb)   SpO2 97%   BMI 38.74 kg/m²     PHYSICAL EXAM  Gen: NAD  Head: NCAT  CV: RRR  CHEST: CTAB  ABD: soft, NT/ND  EXT: no edema    ASSESSMENT/PLAN:  This is a 66y.o. year old male here for EGD and colonoscopy, and he is stable and optimized for his procedure.

## 2023-09-13 ENCOUNTER — PREP FOR PROCEDURE (OUTPATIENT)
Dept: GASTROENTEROLOGY | Facility: MEDICAL CENTER | Age: 58
End: 2023-09-13

## 2023-09-13 ENCOUNTER — TELEPHONE (OUTPATIENT)
Dept: GASTROENTEROLOGY | Facility: CLINIC | Age: 58
End: 2023-09-13

## 2023-09-13 DIAGNOSIS — K86.9 PANCREATIC LESION: Primary | ICD-10-CM

## 2023-09-13 NOTE — TELEPHONE ENCOUNTER
----- Message from Tessy Sewell sent at 9/13/2023 12:44 PM EDT -----  The patient was found to have a 1.6 cm arterially hyperenhancing pancreatic tail lesion most concerning for pancreatic neuroendocrine tumor. MRI was ordered for further evaluation but unfortunately the patient was not able to tolerate this and would need to be sedated. Therefore I discussed the risks and benefits of endoscopic ultrasound with the patient's nurse and , they would like to proceed.   Order placed and I will send a message off to our advanced endoscopy team

## 2023-09-13 NOTE — TELEPHONE ENCOUNTER
Referring physician : Alma Villa    Diagnosis : pancreatic tail lesion. Discussed with patient : YES/NO: no    Symptoms : -    Labs : reviewed : 6/23 : Hb 10.5, MCV normal, LFT's normal, albumin 3.1    Imaging : CT scan panc tail lesion. Can not get MRI. Prior endoscopy : EGD : 9/23 duodenal polyps    Anticoagulation/ Antiplatelet therapy :  None    Assessment :   1. panc tail lesion.      Plan :   1. EUS    Order placed : YES/NO: yes    Informed scheduling staff : YES/NO: yes    Discussed with GI lab : YES/NO: no

## 2023-09-15 PROCEDURE — 88305 TISSUE EXAM BY PATHOLOGIST: CPT | Performed by: STUDENT IN AN ORGANIZED HEALTH CARE EDUCATION/TRAINING PROGRAM

## 2023-09-19 ENCOUNTER — TELEPHONE (OUTPATIENT)
Dept: GASTROENTEROLOGY | Facility: CLINIC | Age: 58
End: 2023-09-19

## 2023-09-19 NOTE — TELEPHONE ENCOUNTER
Called patient to try and schedule EUS but the phone number went to adult services. I called  that is listed and LM on  for her. Called back and also left message for Danette Kwon who is the  to call me and we can schedule.     09/19/2023    Contacted the director Danette Kwon  And I have faxed the instructions to her at ( [phone])    Please call her directly to confirm time at [phone]

## 2023-10-02 ENCOUNTER — OFFICE VISIT (OUTPATIENT)
Dept: GASTROENTEROLOGY | Facility: MEDICAL CENTER | Age: 58
End: 2023-10-02
Payer: MEDICARE

## 2023-10-02 ENCOUNTER — HOSPITAL ENCOUNTER (EMERGENCY)
Facility: HOSPITAL | Age: 58
Discharge: HOME | End: 2023-10-02
Attending: EMERGENCY MEDICINE
Payer: MEDICARE

## 2023-10-02 ENCOUNTER — APPOINTMENT (EMERGENCY)
Dept: RADIOLOGY | Facility: HOSPITAL | Age: 58
End: 2023-10-02
Payer: MEDICARE

## 2023-10-02 ENCOUNTER — TELEPHONE (OUTPATIENT)
Age: 58
End: 2023-10-02

## 2023-10-02 ENCOUNTER — HOSPITAL ENCOUNTER (EMERGENCY)
Facility: HOSPITAL | Age: 58
Discharge: HOME | End: 2023-10-03
Attending: EMERGENCY MEDICINE | Admitting: EMERGENCY MEDICINE
Payer: MEDICARE

## 2023-10-02 VITALS
HEART RATE: 62 BPM | SYSTOLIC BLOOD PRESSURE: 140 MMHG | BODY MASS INDEX: 43.7 KG/M2 | TEMPERATURE: 98.3 F | OXYGEN SATURATION: 97 % | RESPIRATION RATE: 16 BRPM | DIASTOLIC BLOOD PRESSURE: 82 MMHG | WEIGHT: 270.73 LBS

## 2023-10-02 VITALS
DIASTOLIC BLOOD PRESSURE: 79 MMHG | SYSTOLIC BLOOD PRESSURE: 147 MMHG | BODY MASS INDEX: 41.61 KG/M2 | TEMPERATURE: 99.2 F | HEART RATE: 62 BPM | WEIGHT: 257.8 LBS

## 2023-10-02 VITALS
SYSTOLIC BLOOD PRESSURE: 159 MMHG | TEMPERATURE: 98.6 F | RESPIRATION RATE: 16 BRPM | HEIGHT: 66 IN | DIASTOLIC BLOOD PRESSURE: 76 MMHG | WEIGHT: 270 LBS | HEART RATE: 63 BPM | OXYGEN SATURATION: 96 % | BODY MASS INDEX: 43.39 KG/M2

## 2023-10-02 DIAGNOSIS — S80.02XA CONTUSION OF LEFT KNEE, INITIAL ENCOUNTER: ICD-10-CM
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
## 2023-10-02 DIAGNOSIS — S09.90XA INJURY OF HEAD, INITIAL ENCOUNTER: Primary | ICD-10-CM

## 2023-10-02 DIAGNOSIS — S09.90XA CLOSED HEAD INJURY, INITIAL ENCOUNTER: ICD-10-CM

## 2023-10-02 DIAGNOSIS — K21.9 GASTROESOPHAGEAL REFLUX DISEASE WITHOUT ESOPHAGITIS: Primary | ICD-10-CM

## 2023-10-02 DIAGNOSIS — R29.6 FREQUENT FALLS: ICD-10-CM

## 2023-10-02 PROCEDURE — 99285 EMERGENCY DEPT VISIT HI MDM: CPT

## 2023-10-02 PROCEDURE — 99214 OFFICE O/P EST MOD 30 MIN: CPT | Performed by: NURSE PRACTITIONER

## 2023-10-02 PROCEDURE — 99284 EMERGENCY DEPT VISIT MOD MDM: CPT | Performed by: EMERGENCY MEDICINE

## 2023-10-02 PROCEDURE — 99284 EMERGENCY DEPT VISIT MOD MDM: CPT

## 2023-10-02 PROCEDURE — 70160 X-RAY EXAM OF NASAL BONES: CPT

## 2023-10-02 RX ORDER — ACETAMINOPHEN 500 MG
500 TABLET ORAL EVERY 6 HOURS PRN
COMMUNITY

## 2023-10-02 RX ORDER — ACETAMINOPHEN 325 MG/1
650 TABLET ORAL ONCE
Status: COMPLETED | OUTPATIENT
Start: 2023-10-02 | End: 2023-10-02

## 2023-10-02 RX ORDER — FAMOTIDINE 40 MG/1
40 TABLET, FILM COATED ORAL DAILY
Qty: 30 TABLET | Refills: 3 | Status: SHIPPED | OUTPATIENT
Start: 2023-10-02

## 2023-10-02 RX ORDER — BENZONATATE 200 MG/1
200 CAPSULE ORAL 3 TIMES DAILY PRN
COMMUNITY

## 2023-10-02 RX ADMIN — ACETAMINOPHEN 325MG 650 MG: 325 TABLET ORAL at 16:51

## 2023-10-02 NOTE — DISCHARGE INSTRUCTIONS
Head Injury   WHAT YOU NEED TO KNOW:   A head injury is most often caused by a blow to the head. This may occur from a fall, bicycle injury, sports injury, being struck in the head, or a motor vehicle accident. DISCHARGE INSTRUCTIONS:   Call 911 or have someone else call for any of the following: You cannot be woken. You have a seizure. You stop responding to others or you faint. You have blurry or double vision. Your speech becomes slurred or confused. You have arm or leg weakness, loss of feeling, or new problems with coordination. Your pupils are larger than usual or one pupil is a different size than the other. You have blood or clear fluid coming out of your ears or nose. Return to the emergency department if:   You have repeated or forceful vomiting. You feel confused. Your headache gets worse or becomes severe. You or someone caring for you notices that you are harder to wake than usual.  Contact your healthcare provider if:   Your symptoms last longer than 6 weeks after the injury. Self-care:   Rest  or do quiet activities for 24 to 48 hours. Limit your time watching TV, using the computer, or doing tasks that require a lot of thinking. Slowly return to your normal activities as directed. Do not play sports or do activities that may cause you to get hit in the head. Ask your healthcare provider when you can return to sports. Apply ice  on your head for 15 to 20 minutes every hour or as directed. Use an ice pack, or put crushed ice in a plastic bag. Cover it with a towel before you apply it to your skin. Ice helps prevent tissue damage and decreases swelling and pain.

## 2023-10-02 NOTE — ED NOTES
Went in to discharge pt. Pt found to have soiled pants with stool. Brought paper scrub pants and other supplies needed for patient to clean up and change. Staff requests this RN to help pt change. Asked how pt changes at home. Staff states, "well the staff helps him." While helping pt change he stood up and immediately started urinating all over floor. When asked why he was doing this he stated, "I don't know." Finished changing pt and left pt in room with staff. Both exited department with no further incident.       Douglas Jules, DRISS  10/02/23 1912

## 2023-10-02 NOTE — TELEPHONE ENCOUNTER
Patients GI provider:  Mario Blackwell    Number to return call: [phone] ext. [phone]    Reason for call: Phil from  [facility], pts group home called in to schedule his EUS.      Scheduled procedure/appointment date if applicable: Apt/procedure NA

## 2023-10-02 NOTE — PROGRESS NOTES
[facility] - Outpatient Follow-up Note  Jayne Brunner 66 y.o. male MRN: [id number]  Encounter: [id number]          ASSESSMENT AND PLAN:      1. Pancreatic lesion    History of a 1.6 cm arterially hyperenhancing pancreatic tail lesion that was most concerning for pancreatic neuroendocrine tumor. An MRI was recommended. He was unable to complete the MRI while awake. He would have need to be sedated. Discussed with patient and family the benefits of endoscopic ultrasound and family and patient agreed. This will be done in the near future.    -Endoscopic ultrasound (upper)  -Follow-up in office after testing      2. Chronic diarrhea  3. Partial small bowel obstruction  Patient reporting his BMs are 1-2 times per day only has rare loose stools. He had a history of a small bowel obstruction on CT several weeks ago. Denies any abdominal pain. Denies any melena or hematochezia. Recent colonoscopy noted 1 small flat polyp, hemorrhoids and diverticulosis. Biopsies were negative for microscopic colitis. -Continue Senokot daily    4. GERD  5. Dysphagia  Recent EGD noted multiple duodenal polyps and a small hiatal hernia. Continues with intermittent heartburn. He has tried multiple PPIs in the past with no help. He is not having any dysphagia per . Appetite is good. No coughing or choking.    -Start famotidine 40 mg daily  -Antireflux diet    6. Splenic artery aneurysm  Seen on CT, pending vascular referral      ______________________________________________________________________    SUBJECTIVE: 71-year-old male here for follow-up. He is accompanied by his . He was last seen in GI office 8/8/2023 for chronic diarrhea and history of partial bowel obstruction. Presented to the hospital secondary to chest pain. He had a CT which revealed small bowel obstruction with transition point in the right lower quadrant and question of ileitis.   I revealing scattered areas of small bowel wall thickening more concerning for enteritis. Patient was seen and evaluated by surgery and secondary to continued flatus and bowel movements it was thought that he more likely had enteritis as opposed to SBO. Patient possibly had a viral or bacterial gastroenteritis but with his chronic diarrhea and question SBO with ileitis a colonoscopy was recommended. Recent colonoscopy 9/12/2023 noted 1 flat polyp, small hemorrhoids and diverticulosis. Biopsies were negative for microscopic colitis. Recall colonoscopy recommended in 7 years. History of longstanding GERD. Recent CT noted diffuse esophageal wall thickening and has a history of uncontrolled acid reflux despite PPI therapy. Also reporting dysphagia and oropharyngeal esophagus with solids. Food advances with liquids. EGD was done 9/12/2023 which noted a small type I hiatal hernia, multiple duodenal polyps ,otherwise normal.  Biopsies were negative for celiac, H. pylori and Vidal's esophagus. History of a 1.6 cm arterially hyperenhancing pancreatic tail lesion that was most concerning for pancreatic neuroendocrine tumor. An MRI was recommended. He was unable to complete the MRI while awake. He would have need to be sedated. Discussed with patient and family the benefits of endoscopic ultrasound and family and patient agreed. This will be done in the near future. Prior EGD/colonoscopy     Colonoscopy 9/12/2023-1 flat polyp, small hemorrhoids, diverticulosis. Recall colonoscopy 7 years. Biopsies negative for microscopic colitis. EGD 9/12/2023-The 1st part of the duodenum and 2nd part of the duodenum appeared normal. Multiple polyps in the duodenal bulb. The stomach appeared normal. Small type I hiatal hernia  The esophagus appeared normal.  Biopsies were negative for celiac, H. pylori and Vidal's esophagus. REVIEW OF SYSTEMS IS OTHERWISE NE  Roxann Alan.   10 point review of systems negative other than per HPI      Historical Information   History reviewed. No pertinent past medical history. Past Surgical History:   Procedure Laterality Date   • IR THORACENTESIS  5/25/2023     Social History   Social History     Substance and Sexual Activity   Alcohol Use Not Currently     Social History     Substance and Sexual Activity   Drug Use Not Currently     Social History     Tobacco Use   Smoking Status Never   Smokeless Tobacco Never     History reviewed. No pertinent family history. Meds/Allergies       Current Outpatient Medications:   •  acetaminophen (TYLENOL) 500 mg tablet  •  allopurinol (ZYLOPRIM) 100 mg tablet  •  benzonatate (TESSALON) 200 MG capsule  •  Colchicine 0.6 MG CAPS  •  diphenhydrAMINE (BENADRYL) 50 mg capsule  •  diphenoxylate-atropine (LOMOTIL) 2.5-0.025 mg per tablet  •  divalproex sodium (DEPAKOTE) 500 mg DR tablet  •  EPINEPHrine (EPIPEN) 0.3 mg/0.3 mL SOAJ  •  escitalopram (LEXAPRO) 20 mg tablet  •  famotidine (PEPCID) 40 MG tablet  •  ibuprofen (MOTRIN) 400 mg tablet  •  losartan (COZAAR) 100 MG tablet  •  menthol-zinc oxide (CALMOSEPTINE) 0.44-20.6 % OINT  •  montelukast (SINGULAIR) 10 mg tablet  •  polyethylene glycol (GLYCOLAX) 17 GM/SCOOP powder  •  risperiDONE (RisperDAL) 0.5 mg tablet  •  risperiDONE (RisperDAL) 1 mg tablet  •  tamsulosin (FLOMAX) 0.4 mg  •  bisacodyl (DULCOLAX) 5 mg EC tablet  •  dicyclomine (BENTYL) 10 mg capsule  •  furosemide (LASIX) 20 mg tablet  •  losartan (COZAAR) 100 MG tablet  •  omeprazole (PriLOSEC) 40 MG capsule  •  polyethylene glycol (GOLYTELY) 4000 mL solution  •  senna (SENOKOT) 8.6 mg  •  sucralfate (CARAFATE) 1 g tablet    Allergies   Allergen Reactions   • Bee Venom Anaphylaxis, Hives and Swelling           Objective     Blood pressure 147/79, pulse 62, temperature 99.2 °F (37.3 °C), temperature source Tympanic, weight 117 kg (257 lb 12.8 oz). Body mass index is 41.61 kg/m².       PHYSICAL EXAM:      General Appearance:   Alert, cooperative, no distress   HEENT:   Normocephalic, atraumatic, anicteric. Neck:  Supple, symmetrical, trachea midline   Lungs:   Clear to auscultation bilaterally; no rales, rhonchi or wheezing; respirations unlabored    Heart[de-identified]   Regular rate and rhythm; no murmur, rub, or gallop. Abdomen:   Soft, non-tender, non-distended; normal bowel sounds; no masses, no organomegaly    Genitalia:   Deferred    Rectal:   Deferred    Extremities:  No cyanosis, clubbing or edema    Pulses:  2+ and symmetric    Skin:  No jaundice, rashes, or lesions    Lymph nodes:  No palpable cervical lymphadenopathy        Lab Results:   No visits with results within 1 Day(s) from this visit.    Latest known visit with results is:   Hospital Outpatient Visit on 09/12/2023   Component Date Value   • Case Report 09/12/2023                      Value:Surgical Pathology Report                         Case: [id number]                                   Authorizing Provider:  Emile Nelson MD           Collected:           09/12/2023 1337              Ordering Location:     [address] [place] [address] Received:            09/12/2023 1545                                     Heart Endoscopy                                                              Pathologist:           Jay Blackburn MD                                                          Specimens:   A) - Stomach, duodenal biopsy r/o ciliac                                                            B) - Stomach, duodenal polypoid mucosa biopsy                                                       C) - Stomach, gastric r/o h pylori                                                                  D) - Esophagogastric junction, esophaus distal r/o EOE                                              E) - Esophagogastric junction, proximal esophagus r/o EOE                                                                     F) - Colon, random biopsy r/o microscopic colitis • Final Diagnosis 09/12/2023                      Value: This result contains rich text formatting which cannot be displayed here. • Additional Information 09/12/2023                      Value: This result contains rich text formatting which cannot be displayed here. • Gross Description 09/12/2023                      Value: This result contains rich text formatting which cannot be displayed here. Radiology Results:   MRI abdomen wo contrast    Result Date: 9/12/2023  Narrative: MRI - ABDOMEN - WITHOUT CONTRAST, limited INDICATION: 66 years / Male  K80.7: Disease of pancreas, unspecified. COMPARISON: CTA chest/abdomen/pelvis 6/3/2023. TECHNIQUE: Limited nondiagnostic MRI comprised of only two 3 plane localizer sequences and a motion-degraded DWI sequence, as the patient declined further imaging. IV Contrast: None FINDINGS: Nondiagnostic study as per the technique section. Impression: Nondiagnostic, limited study. Workstation performed: [id number]     EGD    Result Date: 9/12/2023  Narrative: Table formatting from the original result was not included. [address] [facility] [id number] [facility] [id number] [phone] DATE OF SERVICE: 9/12/23 PHYSICIAN(S): Attending: Milena Saleh MD Fellow: Bart Marie MD INDICATION: Other dysphagia POST-OP DIAGNOSIS: See the impression below. PREPROCEDURE: Informed consent was obtained for the procedure, including sedation. Risks of perforation, hemorrhage, adverse drug reaction and aspiration were discussed. The patient was placed in the left lateral decubitus position. Patient was explained about the risks and benefits of the procedure. Risks including but not limited to bleeding, infection, and perforation were explained in detail. Also explained about less than 100% sensitivity with the exam and other alternatives.  PROCEDURE: EGD DETAILS OF PROCEDURE: Patient was taken to the procedure room where a time out was performed to confirm correct patient and correct procedure. The patient underwent monitored anesthesia care, which was administered by an anesthesia professional. The patient's blood pressure, heart rate, level of consciousness, respirations, oxygen, ECG and ETCO2 were monitored throughout the procedure. The scope was introduced through the mouth and advanced to the second part of the duodenum. Retroflexion was performed in the fundus. Prior to the procedure, the patient's H. Pylori status was unknown. The patient experienced no blood loss. The procedure was not difficult. The patient tolerated the procedure well. There were no apparent adverse events. ANESTHESIA INFORMATION: ASA: III Anesthesia Type: IV Sedation with Anesthesia MEDICATIONS: No administrations occurring from 1327 to 1353 on 09/12/23 FINDINGS: The 1st part of the duodenum and 2nd part of the duodenum appeared normal. Performed random biopsy using biopsy forceps to rule out celiac disease. Multiple sessile polyps in the duodenal bulb; performed cold forceps biopsy. Consistent with Brunner's gland hyperplasia The stomach appeared normal. Performed random biopsy using biopsy forceps to rule out H. pylori. Small sliding hiatal hernia (type I hiatal hernia) - GE junction 38 cm from the incisors, diaphragmatic impression 41 cm from the incisors:  Hill classification: Grade II The esophagus appeared normal. Performed random biopsy using biopsy forceps to rule out eosinophilic esophagitis.  SPECIMENS: ID Type Source Tests Collected by Time Destination 1 : duodenal biopsy r/o ciliac Tissue Stomach TISSUE EXAM Lucy Delaney RN 9/12/2023 1337  2 : duodenal polypoid mucosa biopsy Tissue Stomach TISSUE EXAM Lucy Delaney RN 9/12/2023 1342  3 : gastric r/o h pylori Tissue Stomach TISSUE EXAM Lucy Delaney RN 9/12/2023 1343  4 : esophaus distal r/o EOE Tissue Esophagogastric junction TISSUE EXAM Lucy Delaney RN 9/12/2023 1349  5 : proximal esophagus r/o EOE Tissue Esophagogastric junction TISSUE EXAM Leon Lopes RN 9/12/2023 1350      Impression: The 1st part of the duodenum and 2nd part of the duodenum appeared normal. Performed random biopsy to rule out celiac disease. Multiple polyps in the duodenal bulb were removed with cold forceps biopsy The stomach appeared normal. Performed random biopsy to rule out H. pylori. Small type I hiatal hernia The esophagus appeared normal. Performed random biopsy to rule out eosinophilic esophagitis. RECOMMENDATION:  Await pathology results  Proceed with colonoscopy    No Staff Documented     Colonoscopy    Result Date: 9/12/2023  Narrative: Table formatting from the original result was not included. [address] [facility] [id number] [facility] [id number] [phone] DATE OF SERVICE: 9/12/23 PHYSICIAN(S): Attending: Sue Beauchamp MD Fellow: No Staff Documented INDICATION: Ileitis, Colon cancer screening POST-OP DIAGNOSIS: See the impression below. HISTORY: Prior colonoscopy: No prior colonoscopy. BOWEL PREPARATION: Miralax/Dulcolax; Golytely/Colyte/Trilyte PREPROCEDURE: Informed consent was obtained for the procedure, including sedation. Risks including but not limited to bleeding, infection, perforation, adverse drug reaction and aspiration were explained in detail. Also explained about less than 100% sensitivity with the exam and other alternatives. The patient was placed in the left lateral decubitus position. Procedure: Colonoscopy DETAILS OF PROCEDURE: Patient was taken to the procedure room where a time out was performed to confirm correct patient and correct procedure. The patient underwent monitored anesthesia care, which was administered by an anesthesia professional. The patient's blood pressure, heart rate, level of consciousness, oxygen, respirations, ECG and ETCO2 were monitored throughout the procedure. A digital rectal exam was performed.  The scope was introduced through the anus and advanced to the cecum. Retroflexion was performed in the rectum. The quality of bowel preparation was evaluated using the Groot-Bijgaarden Bowel Preparation Scale with scores of: right colon = 2, transverse colon = 2, left colon = 2. The total BBPS score was 6. Bowel prep was adequate. The patient experienced no blood loss. The procedure was not difficult. The patient tolerated the procedure well. There were no apparent adverse events.  ANESTHESIA INFORMATION: ASA: III Anesthesia Type: IV Sedation with Anesthesia MEDICATIONS: No administrations occurring from 1327 to 1412 on 09/12/23 FINDINGS: One flat polyp measuring smaller than 5 mm in the cecum; completely removed en bloc by cold snare but did not retrieve specimen The terminal ileum and entire colon appeared normal. Performed forceps biopsies in the ascending colon, transverse colon and sigmoid colon to rule out colitis Internal small hemorrhoids EVENTS: Procedure Events Event Event Time ENDO CECUM REACHED 9/12/2023  1:58 PM ENDO SCOPE OUT TIME 9/12/2023  2:09 PM SPECIMENS: ID Type Source Tests Collected by Time Destination 1 : duodenal biopsy r/o ciliac Tissue Stomach TISSUE EXAM Fahad Kang RN 9/12/2023  1:37 PM  2 : duodenal polypoid mucosa biopsy Tissue Stomach TISSUE EXAM Fahad Kang RN 9/12/2023  1:42 PM  3 : gastric r/o h pylori Tissue Stomach TISSUE EXAM Fahad Kang RN 9/12/2023  1:43 PM  4 : esophaus distal r/o EOE Tissue Esophagogastric junction TISSUE EXAM Fahad Kang RN 9/12/2023  1:49 PM  5 : proximal esophagus r/o EOE Tissue Esophagogastric junction TISSUE EXAM Fahad Kang RN 9/12/2023  1:50 PM  6 : random biopsy r/o microscopic colitis Tissue Colon TISSUE EXAM Fahad Kang RN 9/12/2023  2:04 PM  EQUIPMENT: Colonoscope -[id number] ENDOCUFF VISION LRG GREEN ID [id number]     Impression: Subcentimeter polyp in the cecum was removed with cold snare The terminal ileum and entire colon appeared normal. Performed forceps biopsies in the ascending colon, transverse colon and sigmoid colon to rule out colitis Small hemorrhoids RECOMMENDATION:  Repeat colonoscopy in 7 years  Personal history of colon polyps   Await pathology results   Sandra Cheadle, MD

## 2023-10-02 NOTE — ED PROVIDER NOTES
History  Chief Complaint   Patient presents with   • Fall     Pt c/o  L knee pain and nose pain. Pt states that he tripped. 65 yo M brought by ambulance from the group home where he resides after he tripped and fell. He tripped on something on the floor and fell onto his left knee and forehead and nose. He denies syncope, he denies LOC. He was able to get up and has continued to be ambulatory. He is not on any bloodthinners. History provided by:  Patient      Prior to Admission Medications   Prescriptions Last Dose Informant Patient Reported? Taking?    Colchicine 0.6 MG CAPS  Outside Facility (Specify) Yes No   Sig: Take 0.6 mg by mouth daily   EPINEPHrine (EPIPEN) 0.3 mg/0.3 mL SOAJ  Outside Facility (Specify) Yes No   Sig: Inject 0.3 mg into a muscle   acetaminophen (TYLENOL) 500 mg tablet   Yes No   Sig: Take 500 mg by mouth every 6 (six) hours as needed for mild pain   allopurinol (ZYLOPRIM) 100 mg tablet  Outside Facility (Specify) Yes No   Sig: Take 100 mg by mouth daily   benzonatate (TESSALON) 200 MG capsule   Yes No   Sig: Take 200 mg by mouth 3 (three) times a day as needed for cough   bisacodyl (DULCOLAX) 5 mg EC tablet  Outside Facility (Specify) No No   Sig: Take as directed as per written office instructions   Patient not taking: Reported on 8/30/2023   dicyclomine (BENTYL) 10 mg capsule  Outside Facility (Specify) No No   Sig: Take 1 capsule (10 mg total) by mouth 4 (four) times a day as needed (abdominal pain)   Patient not taking: Reported on 8/30/2023   diphenhydrAMINE (BENADRYL) 50 mg capsule  Outside Facility (Specify) Yes No   Sig: Take 50 mg by mouth every 6 (six) hours as needed   diphenoxylate-atropine (LOMOTIL) 2.5-0.025 mg per tablet  Outside Facility (Specify) Yes No   Sig: Take 2 tablets by mouth 2 (two) times a day as needed   divalproex sodium (DEPAKOTE) 500 mg DR tablet  Outside Facility (Specify) Yes No   Sig: Take 500 mg by mouth 2 (two) times a day   escitalopram (LEXAPRO) 20 mg tablet  Outside Facility (Specify) Yes No   Sig: Take 20 mg by mouth daily   famotidine (PEPCID) 40 MG tablet   No No   Sig: Take 1 tablet (40 mg total) by mouth daily   furosemide (LASIX) 20 mg tablet  Outside Facility (Specify) Yes No   Sig: Take 1 tablet by mouth daily as needed   ibuprofen (MOTRIN) 400 mg tablet  Outside Facility (Specify) Yes No   Sig: Take 600 mg by mouth every 6 (six) hours as needed   losartan (COZAAR) 100 MG tablet  Outside Facility (Specify) Yes No   Sig: Take 25 mg by mouth daily   Patient not taking: Reported on 8/30/2023   losartan (COZAAR) 100 MG tablet  Outside Facility (Specify) Yes No   Sig: Take 100 mg by mouth daily   menthol-zinc oxide (CALMOSEPTINE) 0.44-20.6 % OINT  Outside Facility (Specify) Yes No   Sig: Apply topically   montelukast (SINGULAIR) 10 mg tablet  Outside Facility (Specify) Yes No   Sig: Take 10 mg by mouth   omeprazole (PriLOSEC) 40 MG capsule  Outside Facility (Specify) No No   Sig: Take 1 capsule (40 mg total) by mouth daily   Patient not taking: Reported on 8/30/2023   polyethylene glycol (GLYCOLAX) 17 GM/SCOOP powder  Outside Facility (Specify) Yes No   Sig: Take 17 g by mouth   polyethylene glycol (GOLYTELY) 4000 mL solution   No No   Sig: Take 4,000 mL by mouth once for 1 dose   risperiDONE (RisperDAL) 0.5 mg tablet  Outside Facility (Specify) Yes No   Sig: Take 0.5 mg by mouth 2 (two) times a day   risperiDONE (RisperDAL) 1 mg tablet  Outside Facility (Specify) Yes No   Sig: Take 1 mg by mouth 2 (two) times a day   senna (SENOKOT) 8.6 mg   No No   Sig: Take 2 tablets (17.2 mg total) by mouth daily at bedtime as needed for constipation   sucralfate (CARAFATE) 1 g tablet  Outside Facility (Specify) No No   Sig: Take 1 tablet (1 g total) by mouth 4 (four) times a day   Patient not taking: Reported on 8/30/2023   tamsulosin (FLOMAX) 0.4 mg  Outside Facility (Specify) Yes No   Sig: Take 0.8 mg by mouth      Facility-Administered Medications: None       History reviewed. No pertinent past medical history. Past Surgical History:   Procedure Laterality Date   • IR THORACENTESIS  5/25/2023       History reviewed. No pertinent family history. I have reviewed and agree with the history as documented. E-Cigarette/Vaping   • E-Cigarette Use Never User      E-Cigarette/Vaping Substances   • Nicotine No    • THC No    • CBD No    • Flavoring No    • Other No    • Unknown No      Social History     Tobacco Use   • Smoking status: Never   • Smokeless tobacco: Never   Vaping Use   • Vaping Use: Never used   Substance Use Topics   • Alcohol use: Never   • Drug use: Never       Review of Systems    Physical Exam  Physical Exam  Vitals and nursing note reviewed. Constitutional:       Appearance: Normal appearance. He is well-developed. He is obese. He is not toxic-appearing or diaphoretic. HENT:      Head: Normocephalic and atraumatic. Right Ear: Tympanic membrane and external ear normal.      Left Ear: External ear normal.      Nose: Signs of injury (abrasion over the bridge of the nose, associated tenderss) present. Right Nostril: No epistaxis or septal hematoma. Left Nostril: No epistaxis or septal hematoma. Right Turbinates: Not enlarged. Left Turbinates: Not enlarged. Eyes:      General: Lids are normal.      Conjunctiva/sclera: Conjunctivae normal.      Pupils: Pupils are equal, round, and reactive to light. Neck:      Vascular: No JVD. Meningeal: Brudzinski's sign and Kernig's sign absent. Cardiovascular:      Rate and Rhythm: Normal rate and regular rhythm. Heart sounds: Normal heart sounds. No murmur heard. Pulmonary:      Effort: Pulmonary effort is normal. No tachypnea, accessory muscle usage or respiratory distress. Breath sounds: Normal breath sounds. No wheezing. Abdominal:      General: Bowel sounds are normal. There is no distension. Palpations: Abdomen is soft. Abdomen is not rigid. There is no mass. Tenderness: There is no abdominal tenderness. There is no guarding or rebound. Musculoskeletal:         General: Normal range of motion. Cervical back: Normal range of motion and neck supple. Left knee: Ecchymosis (anterior patella) present. No swelling, deformity, erythema, bony tenderness or crepitus. Normal range of motion. No tenderness. Lymphadenopathy:      Head:      Right side of head: No submental, submandibular, preauricular or posterior auricular adenopathy. Left side of head: No submental, submandibular, preauricular or posterior auricular adenopathy. Cervical: No cervical adenopathy. Skin:     General: Skin is warm and dry. Capillary Refill: Capillary refill takes less than 2 seconds. Findings: No rash. Neurological:      Mental Status: He is alert and oriented to person, place, and time. Mental status is at baseline. GCS: GCS eye subscore is 4. GCS verbal subscore is 5. GCS motor subscore is 6. Cranial Nerves: No cranial nerve deficit. Sensory: Sensation is intact. No sensory deficit. Motor: Motor function is intact. Coordination: Coordination normal.      Gait: Gait normal.      Deep Tendon Reflexes: Reflexes are normal and symmetric. Psychiatric:         Attention and Perception: Attention normal.         Mood and Affect: Affect is flat. Speech: Speech normal.         Behavior: Behavior normal. Behavior is cooperative. Thought Content:  Thought content normal.         Vital Signs  ED Triage Vitals   Temperature Pulse Respirations Blood Pressure SpO2   10/02/23 1636 10/02/23 1636 10/02/23 1636 10/02/23 1817 10/02/23 1636   98.3 °F (36.8 °C) 62 16 140/82 97 %      Temp Source Heart Rate Source Patient Position - Orthostatic VS BP Location FiO2 (%)   10/02/23 1636 10/02/23 1636 10/02/23 1636 10/02/23 1636 --   Oral Monitor Lying Right arm       Pain Score       10/02/23 1651       10 - Worst Possible Pain           Vitals:    10/02/23 1636 10/02/23 1817   BP:  140/82   Pulse: 62    Patient Position - Orthostatic VS: Lying          Visual Acuity      ED Medications  Medications   acetaminophen (TYLENOL) tablet 650 mg (650 mg Oral Given 10/2/23 1651)       Diagnostic Studies  Results Reviewed     None                 XR nasal bones    (Results Pending)              Procedures  Procedures         ED Course  ED Course as of 10/03/23 0819   Mon Oct 02, 2023   1751 Waiting for his xray, he was drinking from a straw and eating some adrian.  He had a coughing spell in the ED after eating and drinking, without respiratory distress. His  said he has had issues with aspirating by when eating or drinking. On reassessment he is in no distress, VSS. I don't think any imaging would be helpful at this point . SBIRT 22yo+    Flowsheet Row Most Recent Value   Initial Alcohol Screen: US AUDIT-C     1. How often do you have a drink containing alcohol? 0 Filed at: 10/02/2023 1638   2. How many drinks containing alcohol do you have on a typical day you are drinking? 0 Filed at: 10/02/2023 1638   3a. Male UNDER 65: How often do you have five or more drinks on one occasion? 0 Filed at: 10/02/2023 1638   3b. FEMALE Any Age, or MALE 65+: How often do you have 4 or more drinks on one occassion? 0 Filed at: 10/02/2023 1638   Audit-C Score 0 Filed at: 10/02/2023 1638   MARTITA: How many times in the past year have you. .. Used an illegal drug or used a prescription medication for non-medical reasons? Never Filed at: 10/02/2023 1638                    Medical Decision Making  Fall with no significant head injury, no LOC, normal mental status, no bloodthinners. Possibly fractured nasal bones, but otherwise expected abrasion on nose and forehead as described by mechanism, along with left knee. I do not think CT imaging is necessary at this time.   Care handed over to Dr. Bala Avila pending the nasal bone fracture, but otherwise I have him prepared to be discharged. Amount and/or Complexity of Data Reviewed  Radiology: ordered. Risk  OTC drugs. Disposition  Final diagnoses:   Fall, initial encounter   Closed head injury, initial encounter   Contusion of left knee, initial encounter     Time reflects when diagnosis was documented in both MDM as applicable and the Disposition within this note     Time User Action Codes Description Comment    10/2/2023  4:54 PM Tanda Savin Add [Y37. HQLM] Fall, initial encounter     10/2/2023  4:54 PM Anisa BEYER Add [S09.90XA] Closed head injury, initial encounter     10/2/2023  4:54 PM Tanda Savin Add [S80.02XA] Contusion of left knee, initial encounter       ED Disposition     ED Disposition   Discharge    Condition   Good    Date/Time   Mon Oct 2, 2023  5:44 PM    Comment   Krzysztof Bruner discharge to home/self care. Follow-up Information     Follow up With Specialties Details Why Contact Leti Krishna DO Pediatrics Schedule an appointment as soon as possible for a visit  As needed [address] [place] [address]  [phone]            Discharge Medication List as of 10/2/2023  6:42 PM      CONTINUE these medications which have NOT CHANGED    Details   acetaminophen (TYLENOL) 500 mg tablet Take 500 mg by mouth every 6 (six) hours as needed for mild pain, Historical Med      allopurinol (ZYLOPRIM) 100 mg tablet Take 100 mg by mouth daily, Starting Fri 3/24/2023, Until Sat 3/23/2024, Historical Med      benzonatate (TESSALON) 200 MG capsule Take 200 mg by mouth 3 (three) times a day as needed for cough, Historical Med      bisacodyl (DULCOLAX) 5 mg EC tablet Take as directed as per written office instructions, Normal      Colchicine 0.6 MG CAPS Take 0.6 mg by mouth daily, Starting Mon 4/24/2023, Historical Med      dicyclomine (BENTYL) 10 mg capsule Take 1 capsule (10 mg total) by mouth 4 (four) times a day as needed (abdominal pain), Starting Tue 8/8/2023, Normal      diphenhydrAMINE (BENADRYL) 50 mg capsule Take 50 mg by mouth every 6 (six) hours as needed, Starting Fri 3/17/2023, Historical Med      diphenoxylate-atropine (LOMOTIL) 2.5-0.025 mg per tablet Take 2 tablets by mouth 2 (two) times a day as needed, Starting Fri 3/17/2023, Historical Med      divalproex sodium (DEPAKOTE) 500 mg DR tablet Take 500 mg by mouth 2 (two) times a day, Historical Med      EPINEPHrine (EPIPEN) 0.3 mg/0.3 mL SOAJ Inject 0.3 mg into a muscle, Starting Fri 3/17/2023, Historical Med      escitalopram (LEXAPRO) 20 mg tablet Take 20 mg by mouth daily, Historical Med      famotidine (PEPCID) 40 MG tablet Take 1 tablet (40 mg total) by mouth daily, Starting Mon 10/2/2023, Normal      furosemide (LASIX) 20 mg tablet Take 1 tablet by mouth daily as needed, Starting Fri 3/17/2023, Until Wed 9/13/2023 at 2359, Historical Med      ibuprofen (MOTRIN) 400 mg tablet Take 600 mg by mouth every 6 (six) hours as needed, Starting Tue 5/30/2023, Historical Med      !! losartan (COZAAR) 100 MG tablet Take 25 mg by mouth daily, Historical Med      !! losartan (COZAAR) 100 MG tablet Take 100 mg by mouth daily, Starting Tue 1/24/2023, Until Wed 1/24/2024, Historical Med      menthol-zinc oxide (CALMOSEPTINE) 0.44-20.6 % OINT Apply topically, Starting Fri 3/17/2023, Historical Med      montelukast (SINGULAIR) 10 mg tablet Take 10 mg by mouth, Starting Fri 3/17/2023, Historical Med      omeprazole (PriLOSEC) 40 MG capsule Take 1 capsule (40 mg total) by mouth daily, Starting Tue 8/8/2023, Normal      polyethylene glycol (GLYCOLAX) 17 GM/SCOOP powder Take 17 g by mouth, Starting Fri 4/21/2023, Until Sat 4/20/2024 at 2359, Historical Med      polyethylene glycol (GOLYTELY) 4000 mL solution Take 4,000 mL by mouth once for 1 dose, Starting Tue 8/8/2023, Normal      !! risperiDONE (RisperDAL) 0.5 mg tablet Take 0.5 mg by mouth 2 (two) times a day, Historical Med      !! risperiDONE (RisperDAL) 1 mg tablet Take 1 mg by mouth 2 (two) times a day, Historical Med      senna (SENOKOT) 8.6 mg Take 2 tablets (17.2 mg total) by mouth daily at bedtime as needed for constipation, Starting Mon 5/29/2023, Until Wed 6/28/2023 at 2359, Normal      sucralfate (CARAFATE) 1 g tablet Take 1 tablet (1 g total) by mouth 4 (four) times a day, Starting Sat 6/3/2023, Normal      tamsulosin (FLOMAX) 0.4 mg Take 0.8 mg by mouth, Starting Fri 3/17/2023, Until Sat 3/16/2024 at 2359, Historical Med       !! - Potential duplicate medications found. Please discuss with provider. No discharge procedures on file.     PDMP Review     None          ED Provider  Electronically Signed by           Danuta Seo MD  10/03/23 9915

## 2023-10-03 ENCOUNTER — APPOINTMENT (EMERGENCY)
Dept: RADIOLOGY | Facility: HOSPITAL | Age: 58
End: 2023-10-03
Payer: MEDICARE

## 2023-10-03 ENCOUNTER — APPOINTMENT (EMERGENCY)
Dept: CT IMAGING | Facility: HOSPITAL | Age: 58
End: 2023-10-03
Payer: MEDICARE

## 2023-10-03 LAB
ALBUMIN SERPL BCP-MCNC: 3 G/DL (ref 3.5–5)
ALP SERPL-CCNC: 41 U/L (ref 34–104)
ALT SERPL W P-5'-P-CCNC: 5 U/L (ref 7–52)
ANION GAP SERPL CALCULATED.3IONS-SCNC: 5 MMOL/L
AST SERPL W P-5'-P-CCNC: 13 U/L (ref 13–39)
ATRIAL RATE: 70 BPM
BASOPHILS # BLD AUTO: 0.02 THOUSANDS/ÂΜL (ref 0–0.1)
BASOPHILS NFR BLD AUTO: 0 % (ref 0–1)
BILIRUB SERPL-MCNC: 0.4 MG/DL (ref 0.2–1)
BUN SERPL-MCNC: 15 MG/DL (ref 5–25)
CALCIUM ALBUM COR SERPL-MCNC: 8.8 MG/DL (ref 8.3–10.1)
CALCIUM SERPL-MCNC: 8 MG/DL (ref 8.4–10.2)
CHLORIDE SERPL-SCNC: 106 MMOL/L (ref 96–108)
CO2 SERPL-SCNC: 27 MMOL/L (ref 21–32)
CREAT SERPL-MCNC: 0.85 MG/DL (ref 0.6–1.3)
EOSINOPHIL # BLD AUTO: 0.05 THOUSAND/ÂΜL (ref 0–0.61)
EOSINOPHIL NFR BLD AUTO: 1 % (ref 0–6)
ERYTHROCYTE [DISTWIDTH] IN BLOOD BY AUTOMATED COUNT: 15.2 % (ref 11.6–15.1)
GFR SERPL CREATININE-BSD FRML MDRD: 97 ML/MIN/1.73SQ M
GLUCOSE SERPL-MCNC: 84 MG/DL (ref 65–140)
HCT VFR BLD AUTO: 31.3 % (ref 36.5–49.3)
HGB BLD-MCNC: 9.7 G/DL (ref 12–17)
IMM GRANULOCYTES # BLD AUTO: 0.02 THOUSAND/UL (ref 0–0.2)
IMM GRANULOCYTES NFR BLD AUTO: 0 % (ref 0–2)
LYMPHOCYTES # BLD AUTO: 1.2 THOUSANDS/ÂΜL (ref 0.6–4.47)
LYMPHOCYTES NFR BLD AUTO: 22 % (ref 14–44)
MCH RBC QN AUTO: 30.3 PG (ref 26.8–34.3)
MCHC RBC AUTO-ENTMCNC: 31 G/DL (ref 31.4–37.4)
MCV RBC AUTO: 98 FL (ref 82–98)
MONOCYTES # BLD AUTO: 0.75 THOUSAND/ÂΜL (ref 0.17–1.22)
MONOCYTES NFR BLD AUTO: 14 % (ref 4–12)
NEUTROPHILS # BLD AUTO: 3.31 THOUSANDS/ÂΜL (ref 1.85–7.62)
NEUTS SEG NFR BLD AUTO: 63 % (ref 43–75)
NRBC BLD AUTO-RTO: 0 /100 WBCS
P AXIS: 50 DEGREES
PLATELET # BLD AUTO: 194 THOUSANDS/UL (ref 149–390)
PMV BLD AUTO: 9.7 FL (ref 8.9–12.7)
POTASSIUM SERPL-SCNC: 3.6 MMOL/L (ref 3.5–5.3)
PR INTERVAL: 134 MS
PROT SERPL-MCNC: 5.8 G/DL (ref 6.4–8.4)
QRS AXIS: 10 DEGREES
QRSD INTERVAL: 94 MS
QT INTERVAL: 410 MS
QTC INTERVAL: 442 MS
RBC # BLD AUTO: 3.2 MILLION/UL (ref 3.88–5.62)
SODIUM SERPL-SCNC: 138 MMOL/L (ref 135–147)
T WAVE AXIS: 40 DEGREES
VENTRICULAR RATE: 70 BPM
WBC # BLD AUTO: 5.35 THOUSAND/UL (ref 4.31–10.16)

## 2023-10-03 PROCEDURE — 71046 X-RAY EXAM CHEST 2 VIEWS: CPT

## 2023-10-03 PROCEDURE — 85025 COMPLETE CBC W/AUTO DIFF WBC: CPT | Performed by: EMERGENCY MEDICINE

## 2023-10-03 PROCEDURE — 99285 EMERGENCY DEPT VISIT HI MDM: CPT | Performed by: EMERGENCY MEDICINE

## 2023-10-03 PROCEDURE — 70450 CT HEAD/BRAIN W/O DYE: CPT

## 2023-10-03 PROCEDURE — 93005 ELECTROCARDIOGRAM TRACING: CPT

## 2023-10-03 PROCEDURE — 93010 ELECTROCARDIOGRAM REPORT: CPT | Performed by: INTERNAL MEDICINE

## 2023-10-03 PROCEDURE — 72125 CT NECK SPINE W/O DYE: CPT

## 2023-10-03 PROCEDURE — 80053 COMPREHEN METABOLIC PANEL: CPT | Performed by: EMERGENCY MEDICINE

## 2023-10-03 PROCEDURE — G1004 CDSM NDSC: HCPCS

## 2023-10-03 PROCEDURE — 36415 COLL VENOUS BLD VENIPUNCTURE: CPT | Performed by: EMERGENCY MEDICINE

## 2023-10-03 NOTE — ED PROVIDER NOTES
History  Chief Complaint   Patient presents with   • Fall     Pt reports getting dizzy and falling. States he fell earlier today, struck head, and was seen here. Redness/bruising noted to nose and forehead from earlier fall per EMS. Now c/o right forehead pain over eyebrow and right hip pain. 71-year-old male who presents from a group home after a fall. Was seen earlier today after mechanical fall as well. Patient states that he was walking to the refrigerator and tripped striking his head. No loss of conscious. Patient presents with . States that per their protocol, they have to bring him to the hospital for evaluation. Patient currently has no complaints. He is acting normally per the . Fall was unwitnessed. Prior to Admission Medications   Prescriptions Last Dose Informant Patient Reported? Taking?    Colchicine 0.6 MG CAPS  Outside Facility (Specify) Yes No   Sig: Take 0.6 mg by mouth daily   EPINEPHrine (EPIPEN) 0.3 mg/0.3 mL SOAJ  Outside Facility (Specify) Yes No   Sig: Inject 0.3 mg into a muscle   acetaminophen (TYLENOL) 500 mg tablet   Yes No   Sig: Take 500 mg by mouth every 6 (six) hours as needed for mild pain   allopurinol (ZYLOPRIM) 100 mg tablet  Outside Facility (Specify) Yes No   Sig: Take 100 mg by mouth daily   benzonatate (TESSALON) 200 MG capsule   Yes No   Sig: Take 200 mg by mouth 3 (three) times a day as needed for cough   bisacodyl (DULCOLAX) 5 mg EC tablet  Outside Facility (Specify) No No   Sig: Take as directed as per written office instructions   Patient not taking: Reported on 8/30/2023   dicyclomine (BENTYL) 10 mg capsule  Outside Facility (Specify) No No   Sig: Take 1 capsule (10 mg total) by mouth 4 (four) times a day as needed (abdominal pain)   Patient not taking: Reported on 8/30/2023   diphenhydrAMINE (BENADRYL) 50 mg capsule  Outside Facility (Specify) Yes No   Sig: Take 50 mg by mouth every 6 (six) hours as needed diphenoxylate-atropine (LOMOTIL) 2.5-0.025 mg per tablet  Outside Facility (Specify) Yes No   Sig: Take 2 tablets by mouth 2 (two) times a day as needed   divalproex sodium (DEPAKOTE) 500 mg DR tablet  Outside Facility (Specify) Yes No   Sig: Take 500 mg by mouth 2 (two) times a day   escitalopram (LEXAPRO) 20 mg tablet  Outside Facility (Specify) Yes No   Sig: Take 20 mg by mouth daily   famotidine (PEPCID) 40 MG tablet   No No   Sig: Take 1 tablet (40 mg total) by mouth daily   furosemide (LASIX) 20 mg tablet  Outside Facility (Specify) Yes No   Sig: Take 1 tablet by mouth daily as needed   ibuprofen (MOTRIN) 400 mg tablet  Outside Facility (Specify) Yes No   Sig: Take 600 mg by mouth every 6 (six) hours as needed   losartan (COZAAR) 100 MG tablet  Outside Facility (Specify) Yes No   Sig: Take 25 mg by mouth daily   Patient not taking: Reported on 8/30/2023   losartan (COZAAR) 100 MG tablet  Outside Facility (Specify) Yes No   Sig: Take 100 mg by mouth daily   menthol-zinc oxide (CALMOSEPTINE) 0.44-20.6 % OINT  Outside Facility (Specify) Yes No   Sig: Apply topically   montelukast (SINGULAIR) 10 mg tablet  Outside Facility (Specify) Yes No   Sig: Take 10 mg by mouth   omeprazole (PriLOSEC) 40 MG capsule  Outside Facility (Specify) No No   Sig: Take 1 capsule (40 mg total) by mouth daily   Patient not taking: Reported on 8/30/2023   polyethylene glycol (GLYCOLAX) 17 GM/SCOOP powder  Outside Facility (Specify) Yes No   Sig: Take 17 g by mouth   polyethylene glycol (GOLYTELY) 4000 mL solution   No No   Sig: Take 4,000 mL by mouth once for 1 dose   risperiDONE (RisperDAL) 0.5 mg tablet  Outside Facility (Specify) Yes No   Sig: Take 0.5 mg by mouth 2 (two) times a day   risperiDONE (RisperDAL) 1 mg tablet  Outside Facility (Specify) Yes No   Sig: Take 1 mg by mouth 2 (two) times a day   senna (SENOKOT) 8.6 mg   No No   Sig: Take 2 tablets (17.2 mg total) by mouth daily at bedtime as needed for constipation sucralfate (CARAFATE) 1 g tablet  Outside Facility (Specify) No No   Sig: Take 1 tablet (1 g total) by mouth 4 (four) times a day   Patient not taking: Reported on 8/30/2023   tamsulosin (FLOMAX) 0.4 mg  Outside Facility (Specify) Yes No   Sig: Take 0.8 mg by mouth      Facility-Administered Medications: None       History reviewed. No pertinent past medical history. Past Surgical History:   Procedure Laterality Date   • IR THORACENTESIS  5/25/2023       History reviewed. No pertinent family history. I have reviewed and agree with the history as documented. E-Cigarette/Vaping   • E-Cigarette Use Never User      E-Cigarette/Vaping Substances   • Nicotine No    • THC No    • CBD No    • Flavoring No    • Other No    • Unknown No      Social History     Tobacco Use   • Smoking status: Never   • Smokeless tobacco: Never   Vaping Use   • Vaping Use: Never used   Substance Use Topics   • Alcohol use: Never   • Drug use: Never       Review of Systems   Constitutional: Negative for chills, fatigue and fever. HENT: Negative for rhinorrhea, sore throat and trouble swallowing. Eyes: Negative for photophobia and visual disturbance. Respiratory: Negative for cough, chest tightness and shortness of breath. Cardiovascular: Negative for chest pain, palpitations and leg swelling. Gastrointestinal: Negative for abdominal pain, blood in stool, diarrhea, nausea and vomiting. Endocrine: Negative for polyuria. Genitourinary: Negative for dysuria, flank pain and hematuria. Musculoskeletal: Negative for back pain and neck pain. Skin: Negative for color change and rash. Allergic/Immunologic: Negative for immunocompromised state. Neurological: Negative for dizziness, weakness, light-headedness, numbness and headaches. All other systems reviewed and are negative. Physical Exam  Physical Exam  Vitals and nursing note reviewed. Constitutional:       General: He is not in acute distress.      Appearance: He is well-developed. Comments: Chronically ill-appearing. HENT:      Head: Normocephalic. Comments: Small areas of abrasion/ecchymosis over forehead and nose. No tenderness throughout face. Mouth/Throat:      Lips: Pink. Mouth: Mucous membranes are moist.   Eyes:      General: Lids are normal.      Extraocular Movements: Extraocular movements intact. Conjunctiva/sclera: Conjunctivae normal.      Pupils: Pupils are equal, round, and reactive to light. Cardiovascular:      Rate and Rhythm: Normal rate and regular rhythm. Heart sounds: Normal heart sounds. No murmur heard. Pulmonary:      Effort: Pulmonary effort is normal.      Breath sounds: Normal breath sounds. Abdominal:      General: There is no distension. Palpations: Abdomen is soft. Tenderness: There is no abdominal tenderness. There is no guarding or rebound. Musculoskeletal:         General: No swelling. Cervical back: Full passive range of motion without pain, normal range of motion and neck supple. No spinous process tenderness or muscular tenderness. Comments: No C-spine, T-spine, L-spine tenderness. Skin:     General: Skin is warm. Capillary Refill: Capillary refill takes less than 2 seconds. Findings: No rash. Neurological:      General: No focal deficit present. Mental Status: He is alert. Psychiatric:         Mood and Affect: Mood normal.         Speech: Speech normal.         Behavior: Behavior is slowed.          Vital Signs  ED Triage Vitals [10/02/23 2327]   Temperature Pulse Respirations Blood Pressure SpO2   98.6 °F (37 °C) 59 16 (!) 174/78 96 %      Temp Source Heart Rate Source Patient Position - Orthostatic VS BP Location FiO2 (%)   Oral Monitor Lying Right arm --      Pain Score       10 - Worst Possible Pain           Vitals:    10/02/23 2327 10/02/23 2330   BP: (!) 174/78 159/76   Pulse: 59 63   Patient Position - Orthostatic VS: Lying Lying         Visual Acuity  Visual Acuity    Flowsheet Row Most Recent Value   L Pupil Size (mm) 3   R Pupil Size (mm) 3          ED Medications  Medications - No data to display    Diagnostic Studies  Results Reviewed     Procedure Component Value Units Date/Time    CBC and differential [[id number]]  (Abnormal) Collected: 10/03/23 0145    Lab Status: Final result Specimen: Blood from Arm, Left Updated: 10/03/23 0157     WBC 5.35 Thousand/uL      RBC 3.20 Million/uL      Hemoglobin 9.7 g/dL      Hematocrit 31.3 %      MCV 98 fL      MCH 30.3 pg      MCHC 31.0 g/dL      RDW 15.2 %      MPV 9.7 fL      Platelets 404 Thousands/uL      nRBC 0 /100 WBCs      Neutrophils Relative 63 %      Immat GRANS % 0 %      Lymphocytes Relative 22 %      Monocytes Relative 14 %      Eosinophils Relative 1 %      Basophils Relative 0 %      Neutrophils Absolute 3.31 Thousands/µL      Immature Grans Absolute 0.02 Thousand/uL      Lymphocytes Absolute 1.20 Thousands/µL      Monocytes Absolute 0.75 Thousand/µL      Eosinophils Absolute 0.05 Thousand/µL      Basophils Absolute 0.02 Thousands/µL     Comprehensive metabolic panel [[id number]]  (Abnormal) Collected: 10/03/23 0050    Lab Status: Final result Specimen: Blood from Arm, Right Updated: 10/03/23 0111     Sodium 138 mmol/L      Potassium 3.6 mmol/L      Chloride 106 mmol/L      CO2 27 mmol/L      ANION GAP 5 mmol/L      BUN 15 mg/dL      Creatinine 0.85 mg/dL      Glucose 84 mg/dL      Calcium 8.0 mg/dL      Corrected Calcium 8.8 mg/dL      AST 13 U/L      ALT 5 U/L      Alkaline Phosphatase 41 U/L      Total Protein 5.8 g/dL      Albumin 3.0 g/dL      Total Bilirubin 0.40 mg/dL      eGFR 97 ml/min/1.73sq m     Narrative:      [place] guidelines for Chronic Kidney Disease (CKD):   •  Stage 1 with normal or high GFR (GFR > 90 mL/min/1.73 square meters)  •  Stage 2 Mild CKD (GFR = 60-89 mL/min/1.73 square meters)  •  Stage 3A Moderate CKD (GFR = 45-59 mL/min/1.73 square meters)  •  Stage 3B Moderate CKD (GFR = 30-44 mL/min/1.73 square meters)  •  Stage 4 Severe CKD (GFR = 15-29 mL/min/1.73 square meters)  •  Stage 5 End Stage CKD (GFR <15 mL/min/1.73 square meters)  Note: GFR calculation is accurate only with a steady state creatinine                 CT head without contrast   Final Result by Delton Boxer, MD (10/03 0205)      Extracranial soft tissue swelling. No evidence of acute intracranial abnormality. Workstation performed: [id number]         CT cervical spine without contrast   Final Result by Delton Boxer, MD (10/03 0216)      No acute cervical spine fracture or traumatic malalignment. Workstation performed: [id number]         XR chest 2 views   ED Interpretation by Aston Crowley MD (10/03 0205)   No acute cardiopulmonary disease as interpreted by self. Procedures  ECG 12 Lead Documentation Only    Date/Time: 10/3/2023 12:52 AM    Performed by: Aston Crowley MD  Authorized by: Aston Crowley MD    ECG reviewed by me, the ED Provider: yes    Patient location:  ED  Previous ECG:     Previous ECG:  Compared to current    Comparison ECG info:  6/3/23    Similarity:  Changes noted    Comparison to cardiac monitor: Yes    Interpretation:     Interpretation: normal    Rate:     ECG rate:  70    ECG rate assessment: normal    Rhythm:     Rhythm: sinus rhythm    Ectopy:     Ectopy: none    QRS:     QRS axis:  Normal    QRS intervals:  Normal  Conduction:     Conduction: normal    ST segments:     ST segments:  Normal  T waves:     T waves: normal               ED Course  ED Course as of 10/03/23 0219   Tue Oct 03, 2023   0205 CT head without contrast  No acute intracranial abnormality as interpreted by myself.   0205 CT cervical spine without contrast  No acute cervical abnormality as interpreted by myself.                                SBIRT 22yo+    Flowsheet Row Most Recent Value   Initial Alcohol Screen: US AUDIT-C     1. How often do you have a drink containing alcohol? 0 Filed at: 10/02/2023 2325   2. How many drinks containing alcohol do you have on a typical day you are drinking? 0 Filed at: 10/02/2023 2325   3a. Male UNDER 65: How often do you have five or more drinks on one occasion? 0 Filed at: 10/02/2023 2325   3b. FEMALE Any Age, or MALE 65+: How often do you have 4 or more drinks on one occassion? 0 Filed at: 10/02/2023 2325   Audit-C Score 0 Filed at: 10/02/2023 2325   MARTITA: How many times in the past year have you. .. Used an illegal drug or used a prescription medication for non-medical reasons? Never Filed at: 10/02/2023 2325                    Medical Decision Making  Patient presents with head injury after a fall. Overall, patient is a very poor historian. Given this information, will need to evaluate for intracranial abnormality and cervical injury with CT head and C-spine. Since this is his second visit for a fall, will check for other causes of syncope/weakness including anemia versus arrhythmia versus dehydration. CBC to evaluate for marked leukocytosis or anemia. CMP to evaluate for electrolytes and renal function in the setting of possible arrhythmia and dehydration. EKG to evaluate for arrhythmia. Chest x-ray to evaluate for traumatic injury. Frequent falls: acute illness or injury  Injury of head, initial encounter: complicated acute illness or injury that poses a threat to life or bodily functions  Amount and/or Complexity of Data Reviewed  External Data Reviewed: radiology and notes. Details: Reviewed note and imaging from earlier today. Labs: ordered. Radiology: ordered and independent interpretation performed. Decision-making details documented in ED Course. ECG/medicine tests: ordered and independent interpretation performed.           Disposition  Final diagnoses:   Injury of head, initial encounter   Frequent falls     Time reflects when diagnosis was documented in both MDM as applicable and the Disposition within this note     Time User Action Codes Description Comment    10/3/2023  2:18 AM Tadeo CASTRO Add [S09.90XA] Injury of head, initial encounter     10/3/2023  2:18 AM Janice Best Add [R29.6] Frequent falls       ED Disposition     ED Disposition   Discharge    Condition   Stable    Date/Time   Tue Oct 3, 2023  2:18 AM    Comment   Jayne Brunner discharge to home/self care. Follow-up Information     Follow up With Specialties Details Why Contact Info Additional Information    Janna Reid DO Pediatrics Schedule an appointment as soon as possible for a visit   [phone]. 800 [facility] [address] [phone]       [facility]ly [facility] Emergency Department Emergency Medicine Go to  If symptoms worsen [address] [address]  [address] [place] [address] Emergency Department, [address] [place] [address], [facility]          Patient's Medications   Discharge Prescriptions    No medications on file       No discharge procedures on file.     PDMP Review     None          ED Provider  Electronically Signed by           Janice Best MD  10/03/23 1784

## 2023-10-05 NOTE — TELEPHONE ENCOUNTER
Procedure:  EUS   Scheduled date of procedure (as of today):  11/24/23  Physician performing procedure: Dr. Oscar Ornelas   Location of procedure: [facility]   Instructions reviewed with patient by:  Ramin Srivastava - faxed to [facility] at [phone]  Clearances:  n/a

## 2023-11-22 ENCOUNTER — TELEPHONE (OUTPATIENT)
Dept: GASTROENTEROLOGY | Facility: HOSPITAL | Age: 58
End: 2023-11-22

## 2023-11-23 ENCOUNTER — ANESTHESIA EVENT (OUTPATIENT)
Dept: GASTROENTEROLOGY | Facility: HOSPITAL | Age: 58
End: 2023-11-23

## 2023-11-24 ENCOUNTER — ANESTHESIA (OUTPATIENT)
Dept: GASTROENTEROLOGY | Facility: HOSPITAL | Age: 58
End: 2023-11-24

## 2023-11-24 ENCOUNTER — HOSPITAL ENCOUNTER (OUTPATIENT)
Dept: GASTROENTEROLOGY | Facility: HOSPITAL | Age: 58
Setting detail: OUTPATIENT SURGERY
End: 2023-11-24
Attending: INTERNAL MEDICINE
Payer: MEDICARE

## 2023-11-24 VITALS
BODY MASS INDEX: 38.74 KG/M2 | DIASTOLIC BLOOD PRESSURE: 54 MMHG | SYSTOLIC BLOOD PRESSURE: 113 MMHG | RESPIRATION RATE: 18 BRPM | HEART RATE: 69 BPM | TEMPERATURE: 97.2 F | OXYGEN SATURATION: 98 % | WEIGHT: 240 LBS

## 2023-11-24 DIAGNOSIS — K86.9 PANCREATIC LESION: ICD-10-CM

## 2023-11-24 PROCEDURE — 43242 EGD US FINE NEEDLE BX/ASPIR: CPT | Performed by: INTERNAL MEDICINE

## 2023-11-24 PROCEDURE — 88342 IMHCHEM/IMCYTCHM 1ST ANTB: CPT | Performed by: PATHOLOGY

## 2023-11-24 PROCEDURE — 88305 TISSUE EXAM BY PATHOLOGIST: CPT | Performed by: PATHOLOGY

## 2023-11-24 RX ORDER — LIDOCAINE HYDROCHLORIDE 10 MG/ML
INJECTION, SOLUTION EPIDURAL; INFILTRATION; INTRACAUDAL; PERINEURAL AS NEEDED
Status: DISCONTINUED | OUTPATIENT
Start: 2023-11-24 | End: 2023-11-24

## 2023-11-24 RX ORDER — GLYCOPYRROLATE 0.2 MG/ML
INJECTION INTRAMUSCULAR; INTRAVENOUS AS NEEDED
Status: DISCONTINUED | OUTPATIENT
Start: 2023-11-24 | End: 2023-11-24

## 2023-11-24 RX ORDER — PROPOFOL 10 MG/ML
INJECTION, EMULSION INTRAVENOUS AS NEEDED
Status: DISCONTINUED | OUTPATIENT
Start: 2023-11-24 | End: 2023-11-24

## 2023-11-24 RX ORDER — SODIUM CHLORIDE 9 MG/ML
INJECTION, SOLUTION INTRAVENOUS CONTINUOUS PRN
Status: DISCONTINUED | OUTPATIENT
Start: 2023-11-24 | End: 2023-11-24

## 2023-11-24 RX ADMIN — PROPOFOL 20 MG: 10 INJECTION, EMULSION INTRAVENOUS at 11:18

## 2023-11-24 RX ADMIN — PROPOFOL 20 MG: 10 INJECTION, EMULSION INTRAVENOUS at 11:15

## 2023-11-24 RX ADMIN — LIDOCAINE HYDROCHLORIDE 100 MG: 10 INJECTION, SOLUTION EPIDURAL; INFILTRATION; INTRACAUDAL; PERINEURAL at 10:52

## 2023-11-24 RX ADMIN — PROPOFOL 30 MG: 10 INJECTION, EMULSION INTRAVENOUS at 11:05

## 2023-11-24 RX ADMIN — SODIUM CHLORIDE: 9 INJECTION, SOLUTION INTRAVENOUS at 10:50

## 2023-11-24 RX ADMIN — PROPOFOL 30 MG: 10 INJECTION, EMULSION INTRAVENOUS at 10:58

## 2023-11-24 RX ADMIN — PROPOFOL 30 MG: 10 INJECTION, EMULSION INTRAVENOUS at 11:03

## 2023-11-24 RX ADMIN — PROPOFOL 30 MG: 10 INJECTION, EMULSION INTRAVENOUS at 11:01

## 2023-11-24 RX ADMIN — GLYCOPYRROLATE 0.2 MG: 0.2 INJECTION, SOLUTION INTRAMUSCULAR; INTRAVENOUS at 10:52

## 2023-11-24 RX ADMIN — PROPOFOL 30 MG: 10 INJECTION, EMULSION INTRAVENOUS at 11:09

## 2023-11-24 RX ADMIN — PROPOFOL 50 MG: 10 INJECTION, EMULSION INTRAVENOUS at 10:55

## 2023-11-24 RX ADMIN — PROPOFOL 30 MG: 10 INJECTION, EMULSION INTRAVENOUS at 11:07

## 2023-11-24 RX ADMIN — PROPOFOL 150 MG: 10 INJECTION, EMULSION INTRAVENOUS at 10:52

## 2023-11-24 RX ADMIN — PROPOFOL 20 MG: 10 INJECTION, EMULSION INTRAVENOUS at 11:12

## 2023-11-24 NOTE — ANESTHESIA POSTPROCEDURE EVALUATION
Post-Op Assessment Note    CV Status:  Stable  Pain Score: 0    Pain management: adequate       Mental Status:  Alert and awake   Hydration Status:  Euvolemic   PONV Controlled:  Controlled   Airway Patency:  Patent     Post Op Vitals Reviewed: Yes    No anethesia notable event occurred.     Staff: CRNA               BP   91/48   Temp      Pulse  66   Resp   18   SpO2   99%

## 2023-11-24 NOTE — H&P
History and Physical - [facility]  Kalen Jiménez 66 y.o. male MRN: [id number]                  HPI: Kalen Jiménez is a 66y.o. year old male who presents for PANCREATIC TAIL LESION      REVIEW OF SYSTEMS: Per the HPI, and otherwise unremarkable. Historical Information   Past Medical History:   Diagnosis Date    Arthritis     Bipolar disorder ([address])     Depression     Hypertension      Past Surgical History:   Procedure Laterality Date    IR THORACENTESIS  5/25/2023     Social History   Social History     Substance and Sexual Activity   Alcohol Use Never     Social History     Substance and Sexual Activity   Drug Use Never     Social History     Tobacco Use   Smoking Status Never   Smokeless Tobacco Never     History reviewed. No pertinent family history.     Meds/Allergies       Current Outpatient Medications:     acetaminophen (TYLENOL) 500 mg tablet    allopurinol (ZYLOPRIM) 100 mg tablet    benzonatate (TESSALON) 200 MG capsule    divalproex sodium (DEPAKOTE) 500 mg DR tablet    escitalopram (LEXAPRO) 20 mg tablet    famotidine (PEPCID) 40 MG tablet    ibuprofen (MOTRIN) 400 mg tablet    losartan (COZAAR) 100 MG tablet    montelukast (SINGULAIR) 10 mg tablet    psyllium (METAMUCIL) 0.52 g capsule    risperiDONE (RisperDAL) 0.5 mg tablet    risperiDONE (RisperDAL) 1 mg tablet    tamsulosin (FLOMAX) 0.4 mg    bisacodyl (DULCOLAX) 5 mg EC tablet    Colchicine 0.6 MG CAPS    dicyclomine (BENTYL) 10 mg capsule    diphenhydrAMINE (BENADRYL) 50 mg capsule    diphenoxylate-atropine (LOMOTIL) 2.5-0.025 mg per tablet    EPINEPHrine (EPIPEN) 0.3 mg/0.3 mL SOAJ    furosemide (LASIX) 20 mg tablet    losartan (COZAAR) 100 MG tablet    menthol-zinc oxide (CALMOSEPTINE) 0.44-20.6 % OINT    omeprazole (PriLOSEC) 40 MG capsule    polyethylene glycol (GLYCOLAX) 17 GM/SCOOP powder    polyethylene glycol (GOLYTELY) 4000 mL solution    senna (SENOKOT) 8.6 mg    sucralfate (CARAFATE) 1 g tablet    Allergies   Allergen Reactions    Bee Venom Anaphylaxis, Hives and Swelling       Objective     /79   Pulse 97   Temp 98.5 °F (36.9 °C) (Tympanic)   Resp 16   Wt 109 kg (240 lb)   SpO2 97%   BMI 38.74 kg/m²       PHYSICAL EXAM    Gen: NAD  Head: NCAT  CV: RRR  CHEST: Clear  ABD: soft, NT/ND  EXT: no edema      ASSESSMENT/PLAN:  This is a 66y.o. year old male here for egd eus, and he is stable and optimized for his procedure.

## 2023-11-24 NOTE — ANESTHESIA PREPROCEDURE EVALUATION
Procedure:  ENDOSCOPIC ULTRASOUND (UPPER)    Relevant Problems   ANESTHESIA (within normal limits)      CARDIO   (+) Essential hypertension      GI/HEPATIC   (+) Pancreatic lesion   (+) Small bowel obstruction, partial (HCC)      /RENAL   (+) BPH (benign prostatic hyperplasia)      HEMATOLOGY   (+) Acute anemia   (+) Normocytic anemia      MUSCULOSKELETAL   (+) Gouty arthritis of right great toe      PULMONARY   (+) Mild persistent asthma without complication   (+) LUPIS (obstructive sleep apnea)   (+) Recurrent pleural effusion on right      Cardiovascular and Mediastinum   (+) Cardiomegaly      Digestive   (+) Esophagitis      Other   (+) Bipolar disorder (HCC)   (+) Mental impairment   (+) Morbid obesity (HCC)        Physical Exam    Airway    Mallampati score: III  TM Distance: >3 FB  Neck ROM: full     Dental    lower dentures and upper dentures    Cardiovascular      Pulmonary      Other Findings        Anesthesia Plan  ASA Score- 3     Anesthesia Type- IV sedation with anesthesia with ASA Monitors. Additional Monitors:     Airway Plan:     Comment: Pt had 8oz water thickened with psyllium at 8am.       Plan Factors-Exercise tolerance (METS): >4 METS. Chart reviewed. EKG reviewed. Existing labs reviewed. Patient summary reviewed. Patient is not a current smoker. Induction-     Postoperative Plan-     Informed Consent- Anesthetic plan and risks discussed with patient. I personally reviewed this patient with the CRNA. Discussed and agreed on the Anesthesia Plan with the CRNA. Kortney Torres

## 2023-11-30 PROCEDURE — 88305 TISSUE EXAM BY PATHOLOGIST: CPT | Performed by: PATHOLOGY

## 2023-11-30 PROCEDURE — 88342 IMHCHEM/IMCYTCHM 1ST ANTB: CPT | Performed by: PATHOLOGY

## 2023-12-05 ENCOUNTER — TELEPHONE (OUTPATIENT)
Dept: GASTROENTEROLOGY | Facility: CLINIC | Age: 58
End: 2023-12-05

## 2023-12-18 DIAGNOSIS — K21.9 GASTROESOPHAGEAL REFLUX DISEASE WITHOUT ESOPHAGITIS: ICD-10-CM

## 2023-12-18 RX ORDER — FAMOTIDINE 40 MG/1
TABLET, FILM COATED ORAL
Qty: 30 TABLET | Refills: 5 | Status: SHIPPED | OUTPATIENT
Start: 2023-12-18

## 2023-12-28 ENCOUNTER — HOSPITAL ENCOUNTER (OUTPATIENT)
Dept: CT IMAGING | Facility: HOSPITAL | Age: 58
End: 2023-12-28
Payer: MEDICARE

## 2023-12-28 DIAGNOSIS — K86.9 PANCREATIC LESION: ICD-10-CM

## 2023-12-28 PROCEDURE — G1004 CDSM NDSC: HCPCS

## 2023-12-28 PROCEDURE — 74177 CT ABD & PELVIS W/CONTRAST: CPT

## 2023-12-28 RX ADMIN — IOHEXOL 100 ML: 350 INJECTION, SOLUTION INTRAVENOUS at 09:22

## 2024-01-11 ENCOUNTER — OFFICE VISIT (OUTPATIENT)
Dept: SURGICAL ONCOLOGY | Facility: CLINIC | Age: 59
End: 2024-01-11
Payer: MEDICARE

## 2024-01-11 ENCOUNTER — TELEPHONE (OUTPATIENT)
Dept: HEMATOLOGY ONCOLOGY | Facility: CLINIC | Age: 59
End: 2024-01-11

## 2024-01-11 VITALS
WEIGHT: 236 LBS | HEART RATE: 69 BPM | BODY MASS INDEX: 37.93 KG/M2 | HEIGHT: 66 IN | SYSTOLIC BLOOD PRESSURE: 130 MMHG | OXYGEN SATURATION: 98 % | DIASTOLIC BLOOD PRESSURE: 70 MMHG | TEMPERATURE: 98.6 F

## 2024-01-11 DIAGNOSIS — K86.9 PANCREATIC LESION: Primary | ICD-10-CM

## 2024-01-11 DIAGNOSIS — I72.8 SPLENIC ARTERY ANEURYSM (HCC): ICD-10-CM

## 2024-01-11 PROCEDURE — 99215 OFFICE O/P EST HI 40 MIN: CPT | Performed by: SURGERY

## 2024-01-11 RX ORDER — AMLODIPINE BESYLATE 5 MG/1
5 TABLET ORAL DAILY
COMMUNITY
Start: 2023-10-06 | End: 2024-10-05

## 2024-01-11 RX ORDER — CHLORHEXIDINE GLUCONATE 213 G/1000ML
SOLUTION TOPICAL
COMMUNITY
Start: 2023-09-15

## 2024-01-11 NOTE — PROGRESS NOTES
Surgical Oncology Follow Up       [facility] [place]  [address] [facility] [address]  [phone]    Orlin John  1965  [id number]  [facility] [place]  [address] [facility] [address]  [phone]    Diagnoses and all orders for this visit:    Pancreatic lesion  -     CT abdomen pelvis w contrast; Future  -     BUN; Future  -     Creatinine, serum; Future    Splenic artery aneurysm (HCC)  -     Ambulatory Referral to Vascular Surgery; Future    Other orders  -     amLODIPine (NORVASC) 5 mg tablet; Take 5 mg by mouth daily  -     chlorhexidine (Hibiclens) 4 % external liquid; Use to wash body daily for 5 days prior to scheduled surgery.  Use new wash cloth each time.  -     STARCH, THICKENING, PO; Use daily for all liquids, nectar thick consistency        Chief Complaint   Patient presents with    Follow-up       Return in about 6 months (around 7/11/2024) for Office Visit, Imaging - See orders, with Shayy.      Oncology History    No history exists.       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  Current treatment: Observation  Disease status:      History of Present Illness: Patient returns in follow-up of his presumed neuroendocrine tumor.  He is feeling well.  He does have some epigastric discomfort, but no nausea or vomiting.  He is eating well, but is using a thickener for his liquids so he does not aspirate.  CT from December 28, 2023 reveals a loculated right pleural effusion.  There was arterial enhancement in the right lobe of the liver.  This was not seen on portal venous imaging.  The arterially enhancing lesion in the tail of the pancreas is unchanged at 1.6 x 1.4 cm.  A 1.9 cm splenic artery aneurysm is stable.  Personally reviewed the films.  He underwent  EUS in November of this year and no solid masses were seen.  The 2 lesions seen by CT appear to be vascular, and 1 appeared to be venous.    Review of Systems  Complete ROS Surg Onc:   Complete ROS Surg Onc:   Constitutional: The patient denies new or recent history of general fatigue, no recent weight loss, no change in appetite.   Eyes: No complaints of visual problems, no scleral icterus.   ENT: no complaints of ear pain, no hoarseness, no difficulty swallowing,  no tinnitus and no new masses in head, oral cavity, or neck.   Cardiovascular: No complaints of chest pain, no palpitations, no ankle edema.   Respiratory: No complaints of shortness of breath, no cough.   Gastrointestinal: No complaints of jaundice, no bloody stools, no pale stools.   Genitourinary: No complaints of dysuria, no hematuria, no nocturia, no frequent urination, no urethral discharge.   Musculoskeletal: No complaints of weakness, paralysis, joint stiffness or arthralgias.  Integumentary: No complaints of rash, no new lesions.   Neurological: No complaints of convulsions, no seizures, no dizziness.   Hematologic/Lymphatic: No complaints of easy bruising.   Endocrine:  No hot or cold intolerance.  No polydipsia, polyphagia, or polyuria.  Allergy/immunology:  No environmental allergies.  No food allergies.  Not immunocompromised.  Skin:  No pallor or rash.  No wound.        Patient Active Problem List   Diagnosis    Bipolar disorder (HCC)    BPH (benign prostatic hyperplasia)    Cardiomegaly    Chronic diarrhea    Essential hypertension    Gouty arthritis of right great toe    Mental impairment    Mild persistent asthma without complication    Morbid obesity (HCC)    Normocytic anemia    LUPIS (obstructive sleep apnea)    Recurrent pleural effusion on right    Small bowel obstruction, partial (HCC)    Esophagitis    Pancreatic lesion    Aspiration pneumonitis (HCC)    Acute anemia    Splenic artery aneurysm (HCC)     Past Medical History:    Diagnosis Date    Arthritis     Bipolar disorder (HCC)     Depression     Hypertension      Past Surgical History:   Procedure Laterality Date    IR THORACENTESIS  5/25/2023     History reviewed. No pertinent family history.  Social History     Socioeconomic History    Marital status: Single     Spouse name: Not on file    Number of children: Not on file    Years of education: Not on file    Highest education level: Not on file   Occupational History    Not on file   Tobacco Use    Smoking status: Never    Smokeless tobacco: Never   Vaping Use    Vaping status: Never Used   Substance and Sexual Activity    Alcohol use: Never    Drug use: Never    Sexual activity: Not on file   Other Topics Concern    Not on file   Social History Narrative    Not on file     Social Determinants of Health     Financial Resource Strain: Not on file   Food Insecurity: No Food Insecurity (5/26/2023)    Hunger Vital Sign     Worried About Running Out of Food in the Last Year: Never true     Ran Out of Food in the Last Year: Never true   Transportation Needs: No Transportation Needs (5/26/2023)    PRAPARE - Transportation     Lack of Transportation (Medical): No     Lack of Transportation (Non-Medical): No   Physical Activity: Not on file   Stress: Not on file   Social Connections: Not on file   Intimate Partner Violence: Not on file   Housing Stability: Low Risk  (5/26/2023)    Housing Stability Vital Sign     Unable to Pay for Housing in the Last Year: No     Number of Places Lived in the Last Year: 1     Unstable Housing in the Last Year: No       Current Outpatient Medications:     acetaminophen (TYLENOL) 500 mg tablet, Take 500 mg by mouth every 6 (six) hours as needed for mild pain, Disp: , Rfl:     allopurinol (ZYLOPRIM) 100 mg tablet, Take 100 mg by mouth daily, Disp: , Rfl:     amLODIPine (NORVASC) 5 mg tablet, Take 5 mg by mouth daily, Disp: , Rfl:     benzonatate (TESSALON) 200 MG capsule, Take 200 mg by mouth 3 (three) times  a day as needed for cough, Disp: , Rfl:     chlorhexidine (Hibiclens) 4 % external liquid, Use to wash body daily for 5 days prior to scheduled surgery.  Use new wash cloth each time., Disp: , Rfl:     Colchicine 0.6 MG CAPS, Take 0.6 mg by mouth daily, Disp: , Rfl:     diphenhydrAMINE (BENADRYL) 50 mg capsule, Take 50 mg by mouth every 6 (six) hours as needed, Disp: , Rfl:     diphenoxylate-atropine (LOMOTIL) 2.5-0.025 mg per tablet, Take 2 tablets by mouth 2 (two) times a day as needed, Disp: , Rfl:     divalproex sodium (DEPAKOTE) 500 mg DR tablet, Take 500 mg by mouth 2 (two) times a day, Disp: , Rfl:     EPINEPHrine (EPIPEN) 0.3 mg/0.3 mL SOAJ, Inject 0.3 mg into a muscle, Disp: , Rfl:     escitalopram (LEXAPRO) 20 mg tablet, Take 20 mg by mouth daily, Disp: , Rfl:     famotidine (PEPCID) 40 MG tablet, TAKE ONE TABLET BY MOUTH ONCE EVERY DAY FOR GERD/REFLUX, Disp: 30 tablet, Rfl: 5    ibuprofen (MOTRIN) 400 mg tablet, Take 600 mg by mouth every 6 (six) hours as needed, Disp: , Rfl:     losartan (COZAAR) 100 MG tablet, Take 100 mg by mouth daily, Disp: , Rfl:     menthol-zinc oxide (CALMOSEPTINE) 0.44-20.6 % OINT, Apply topically, Disp: , Rfl:     montelukast (SINGULAIR) 10 mg tablet, Take 10 mg by mouth, Disp: , Rfl:     omeprazole (PriLOSEC) 40 MG capsule, TAKE ONE CAPSULE BY MOUTH ONCE EVERY DAY FOR GERD/REFLUX, Disp: 90 capsule, Rfl: 1    polyethylene glycol (GLYCOLAX) 17 GM/SCOOP powder, Take 17 g by mouth, Disp: , Rfl:     psyllium (METAMUCIL) 0.52 g capsule, Take 0.52 g by mouth 2 (two) times a day, Disp: , Rfl:     risperiDONE (RisperDAL) 0.5 mg tablet, Take 0.5 mg by mouth 2 (two) times a day, Disp: , Rfl:     risperiDONE (RisperDAL) 1 mg tablet, Take 1 mg by mouth 2 (two) times a day, Disp: , Rfl:     STARCH, THICKENING, PO, Use daily for all liquids, nectar thick consistency, Disp: , Rfl:     tamsulosin (FLOMAX) 0.4 mg, Take 0.8 mg by mouth, Disp: , Rfl:     bisacodyl (DULCOLAX) 5 mg EC tablet, Take  as directed as per written office instructions (Patient not taking: Reported on 8/30/2023), Disp: 2 tablet, Rfl: 0    dicyclomine (BENTYL) 10 mg capsule, Take 1 capsule (10 mg total) by mouth 4 (four) times a day as needed (abdominal pain) (Patient not taking: Reported on 8/30/2023), Disp: 60 capsule, Rfl: 2    furosemide (LASIX) 20 mg tablet, Take 1 tablet by mouth daily as needed, Disp: , Rfl:     losartan (COZAAR) 100 MG tablet, Take 25 mg by mouth daily (Patient not taking: Reported on 8/30/2023), Disp: , Rfl:     polyethylene glycol (GOLYTELY) 4000 mL solution, Take 4,000 mL by mouth once for 1 dose, Disp: 4000 mL, Rfl: 0    senna (SENOKOT) 8.6 mg, Take 2 tablets (17.2 mg total) by mouth daily at bedtime as needed for constipation, Disp: 60 tablet, Rfl: 0    sucralfate (CARAFATE) 1 g tablet, Take 1 tablet (1 g total) by mouth 4 (four) times a day (Patient not taking: Reported on 8/30/2023), Disp: 60 tablet, Rfl: 0  Allergies   Allergen Reactions    Bee Venom Anaphylaxis, Hives and Swelling     Vitals:    01/11/24 0928   BP: 130/70   Pulse: 69   Temp: 98.6 °F (37 °C)   SpO2: 98%       Physical Exam  Constitutional: General appearance: The Patient is well-developed and well-nourished who appears the stated age in no acute distress. Patient is pleasant and talkative.     HEENT:  Normocephalic.  Sclerae are anicteric. Mucous membranes are moist. Neck is supple without adenopathy. No JVD.     Chest: The lungs are clear to auscultation.     Cardiac: Heart is regular rate.     Abdomen: Abdomen is soft, non-tender, non-distended and without masses.     Extremities: There is no clubbing or cyanosis. There is no edema.  Symmetric.  Neuro: Grossly nonfocal. Gait is normal with a walker.     Lymphatic: No evidence of cervical adenopathy bilaterally.    Skin: Warm, anicteric.    Psych:  Patient is pleasant and talkative.  Breasts:        Pathology:  [unfilled]    Labs:      Imaging  CT abdomen pelvis w contrast    Result  Date: 1/8/2024  Narrative: CT ABDOMEN AND PELVIS WITH IV CONTRAST INDICATION:   Neuroendocrine tumor tail of the pancreas. K86.9: Disease of pancreas, unspecified. COMPARISON: CT chest abdomen pelvis 6/3/2023, outside CT chest 10/13/2023 TECHNIQUE:  CT examination of the abdomen and pelvis was performed.  In addition to arterial phase postcontrast scanning through the abdomen, delayed portal venous phase postcontrast scanning was performed through the abdomen pelvis. Multiplanar 2D reformatted images were created from the source data. This examination, like all CT scans performed in the [facility], was performed utilizing techniques to minimize radiation dose exposure, including the use of iterative reconstruction and automated exposure control. Radiation dose length product (DLP) for this visit:  1546.22 mGy-cm IV Contrast:  100 mL of iohexol (OMNIPAQUE) Enteric Contrast:  Enteric contrast was not administered. FINDINGS: ABDOMEN LOWER CHEST: There is a small right thick walled loculated pleural effusion. This is slightly smaller compared to the outside chest CT exam, question post intervention appearance. Previously there was a larger right pleural effusion without loculation. There is a nodule posterior to the right costophrenic sulcus medially measuring 1.1 x 0.9 cm image 31 series 301, stable from outside CT previously 1.3 x 0.8 cm. This is probably a reactive lymph node. There is a stable hyperdense 1 cm nodule in the left lower lobe. LIVER/BILIARY TREE: On the arterial phase images there is now a patchy hyperdense area in the right lobe of the liver laterally measuring 3.7 x 1.9 cm image 47 series 301. This is not apparent on the portal venous phase images. Question region of transhepatic attenuation difference. No biliary ductal dilatation.. GALLBLADDER:  There are gallstone(s) within the gallbladder, without pericholecystic inflammatory changes. SPLEEN:  Unremarkable. PANCREAS: Arterially  enhance rounded pancreatic tail lesion measures 1.6 x 1.4 cm image 47 series 301, unchanged. Pancreas otherwise unremarkable. ADRENAL GLANDS:  Unremarkable. KIDNEYS/URETERS:  No hydronephrosis or urinary tract calculus.  One or more sharply circumscribed subcentimeter renal hypodensities are present, too small to accurately characterize, and statistically most likely benign findings. According to recent literature (Radiology 2019) no further workup of these findings is recommended. Nonspecific mild bilateral perinephric stranding. STOMACH AND BOWEL: Moderate fecal retention in the colon. APPENDIX:  No findings to suggest appendicitis. ABDOMINOPELVIC CAVITY:  No ascites.  No pneumoperitoneum.  No lymphadenopathy. VESSELS: 1.9 cm splenic artery aneurysm, stable, previously 1.8 cm. PELVIS REPRODUCTIVE ORGANS:  Unremarkable for patient's age. URINARY BLADDER: Underdistended limiting evaluation. Anterior bladder wall may be tethered into the left inguinal hernia. ABDOMINAL WALL/INGUINAL REGIONS: Moderate size fat-containing left inguinal hernia, stable. Small fat-containing umbilical hernia, stable. OSSEOUS STRUCTURES:  No acute fracture or destructive osseous lesion.     Impression: 1. Unchanged enhancing pancreatic tail nodule measuring up to 1.6 cm, again likely a pancreatic neuroendocrine tumor. 2. Now seen in the right lobe of the liver is a patchy hyperdense arterially enhancing area laterally measuring 3.7 x 1.9 cm. This does not persist on the portal venous phase images. Question region of transhepatic attenuation difference. This can be reassessed on follow-up. 3. Stable splenic artery aneurysm measuring 1.9 cm in size, previously 1.8 cm. 4. Slightly smaller loculated right pleural effusion compared to outside CT chest of 10/13/2023. Question post intervention appearance, a chronic inflammatory process is not excluded. Workstation performed: [id number]     FL barium swallow video w speech    Result Date:  1/8/2024  Narrative: History: Dysphagia. Technique: Fluoroscopic guided video radiography speech swallowing study was performed utilizing liquid and solid food products of variable consistency, mixed with barium solution. Findings: There was transient penetration seen with thin liquids administered by spoon and single serving via cup, with a single episode of silent aspiration with successive swallows of thin liquids administered by cup. Penetration to the cords with eventual silent aspiration was seen with nectar thick consistency administered by cup as well. There is transient penetration present with honey thick liquids administered by cup. 3.4 minutes of fluoroscopy time is utilized the radiation dose of 53.37 mGy (CAK).    Impression: Impression: 1. Silent aspiration seen with successive swallows of thin liquids as well as nectar thick consistency, both administered by cup. 2. Transient penetration with thin liquids administered by spoon and single serving via cup, and with honey thick liquids administered by cup. Please refer to the separate speech pathology report for further evaluation. Workstation:[id number]    I personally reviewed and interpreted the above laboratory and imaging data.    Discussion/Summary: 58-year-old male with a presumed neuroendocrine tumor of the pancreas.  By imaging this is stable.  By EUS both lesions seen appear to be vascular.  I did explain that I am slightly concerned that these may both be splenic artery aneurysms, even though the EUS revealed the lesion as venous.  I once again recommended that he see vascular surgery to see if any intervention would be required based on the size of nearly 2 cm.  From a neuroendocrine standpoint, he is asymptomatic.  I would recommend continued observation if these were neuroendocrine tumors.  I will see him again in 6 months with a repeat CT.  He is agreeable to this plan.  All of his and his caregivers questions were answered.

## 2024-01-11 NOTE — LETTER
January 11, 2024     Gadiel Burrows DO  [address] [place] [id number]    Patient: Orlin John   YOB: 1965   Date of Visit: 1/11/2024       Dear Dr. Burrows:    Thank you for referring Orlin John to me for evaluation. Below are my notes for this consultation.    If you have questions, please do not hesitate to call me. I look forward to following your patient along with you.         Sincerely,        Edison Pérez MD        CC: No Recipients    Edison Pérez MD  1/11/2024  9:48 AM  Sign when Signing Visit               Surgical Oncology Follow Up       [facility] [place]  [address] [address]  [phone]    Orlin John  1965  [id number]  [facility]  [address] [address]  [phone]    Diagnoses and all orders for this visit:    Pancreatic lesion  -     CT abdomen pelvis w contrast; Future  -     BUN; Future  -     Creatinine, serum; Future    Splenic artery aneurysm (HCC)  -     Ambulatory Referral to Vascular Surgery; Future    Other orders  -     amLODIPine (NORVASC) 5 mg tablet; Take 5 mg by mouth daily  -     chlorhexidine (Hibiclens) 4 % external liquid; Use to wash body daily for 5 days prior to scheduled surgery.  Use new wash cloth each time.  -     STARCH, THICKENING, PO; Use daily for all liquids, nectar thick consistency        Chief Complaint   Patient presents with   • Follow-up       Return in about 6 months (around 7/11/2024) for Office Visit, Imaging - See orders, with Shayy.      Oncology History    No history exists.       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  Current treatment: Observation  Disease status:      History of Present Illness: Patient returns in follow-up of his presumed  neuroendocrine tumor.  He is feeling well.  He does have some epigastric discomfort, but no nausea or vomiting.  He is eating well, but is using a thickener for his liquids so he does not aspirate.  CT from December 28, 2023 reveals a loculated right pleural effusion.  There was arterial enhancement in the right lobe of the liver.  This was not seen on portal venous imaging.  The arterially enhancing lesion in the tail of the pancreas is unchanged at 1.6 x 1.4 cm.  A 1.9 cm splenic artery aneurysm is stable.  Personally reviewed the films.  He underwent EUS in November of this year and no solid masses were seen.  The 2 lesions seen by CT appear to be vascular, and 1 appeared to be venous.    Review of Systems  Complete ROS Surg Onc:   Complete ROS Surg Onc:   Constitutional: The patient denies new or recent history of general fatigue, no recent weight loss, no change in appetite.   Eyes: No complaints of visual problems, no scleral icterus.   ENT: no complaints of ear pain, no hoarseness, no difficulty swallowing,  no tinnitus and no new masses in head, oral cavity, or neck.   Cardiovascular: No complaints of chest pain, no palpitations, no ankle edema.   Respiratory: No complaints of shortness of breath, no cough.   Gastrointestinal: No complaints of jaundice, no bloody stools, no pale stools.   Genitourinary: No complaints of dysuria, no hematuria, no nocturia, no frequent urination, no urethral discharge.   Musculoskeletal: No complaints of weakness, paralysis, joint stiffness or arthralgias.  Integumentary: No complaints of rash, no new lesions.   Neurological: No complaints of convulsions, no seizures, no dizziness.   Hematologic/Lymphatic: No complaints of easy bruising.   Endocrine:  No hot or cold intolerance.  No polydipsia, polyphagia, or polyuria.  Allergy/immunology:  No environmental allergies.  No food allergies.  Not immunocompromised.  Skin:  No pallor or rash.  No wound.        Patient Active  Problem List   Diagnosis   • Bipolar disorder (HCC)   • BPH (benign prostatic hyperplasia)   • Cardiomegaly   • Chronic diarrhea   • Essential hypertension   • Gouty arthritis of right great toe   • Mental impairment   • Mild persistent asthma without complication   • Morbid obesity (HCC)   • Normocytic anemia   • LUPIS (obstructive sleep apnea)   • Recurrent pleural effusion on right   • Small bowel obstruction, partial (HCC)   • Esophagitis   • Pancreatic lesion   • Aspiration pneumonitis (HCC)   • Acute anemia   • Splenic artery aneurysm (HCC)     Past Medical History:   Diagnosis Date   • Arthritis    • Bipolar disorder (HCC)    • Depression    • Hypertension      Past Surgical History:   Procedure Laterality Date   • IR THORACENTESIS  5/25/2023     History reviewed. No pertinent family history.  Social History     Socioeconomic History   • Marital status: Single     Spouse name: Not on file   • Number of children: Not on file   • Years of education: Not on file   • Highest education level: Not on file   Occupational History   • Not on file   Tobacco Use   • Smoking status: Never   • Smokeless tobacco: Never   Vaping Use   • Vaping status: Never Used   Substance and Sexual Activity   • Alcohol use: Never   • Drug use: Never   • Sexual activity: Not on file   Other Topics Concern   • Not on file   Social History Narrative   • Not on file     Social Determinants of Health     Financial Resource Strain: Not on file   Food Insecurity: No Food Insecurity (5/26/2023)    Hunger Vital Sign    • Worried About Running Out of Food in the Last Year: Never true    • Ran Out of Food in the Last Year: Never true   Transportation Needs: No Transportation Needs (5/26/2023)    PRAPARE - Transportation    • Lack of Transportation (Medical): No    • Lack of Transportation (Non-Medical): No   Physical Activity: Not on file   Stress: Not on file   Social Connections: Not on file   Intimate Partner Violence: Not on file   Housing  Stability: Low Risk  (5/26/2023)    Housing Stability Vital Sign    • Unable to Pay for Housing in the Last Year: No    • Number of Places Lived in the Last Year: 1    • Unstable Housing in the Last Year: No       Current Outpatient Medications:   •  acetaminophen (TYLENOL) 500 mg tablet, Take 500 mg by mouth every 6 (six) hours as needed for mild pain, Disp: , Rfl:   •  allopurinol (ZYLOPRIM) 100 mg tablet, Take 100 mg by mouth daily, Disp: , Rfl:   •  amLODIPine (NORVASC) 5 mg tablet, Take 5 mg by mouth daily, Disp: , Rfl:   •  benzonatate (TESSALON) 200 MG capsule, Take 200 mg by mouth 3 (three) times a day as needed for cough, Disp: , Rfl:   •  chlorhexidine (Hibiclens) 4 % external liquid, Use to wash body daily for 5 days prior to scheduled surgery.  Use new wash cloth each time., Disp: , Rfl:   •  Colchicine 0.6 MG CAPS, Take 0.6 mg by mouth daily, Disp: , Rfl:   •  diphenhydrAMINE (BENADRYL) 50 mg capsule, Take 50 mg by mouth every 6 (six) hours as needed, Disp: , Rfl:   •  diphenoxylate-atropine (LOMOTIL) 2.5-0.025 mg per tablet, Take 2 tablets by mouth 2 (two) times a day as needed, Disp: , Rfl:   •  divalproex sodium (DEPAKOTE) 500 mg DR tablet, Take 500 mg by mouth 2 (two) times a day, Disp: , Rfl:   •  EPINEPHrine (EPIPEN) 0.3 mg/0.3 mL SOAJ, Inject 0.3 mg into a muscle, Disp: , Rfl:   •  escitalopram (LEXAPRO) 20 mg tablet, Take 20 mg by mouth daily, Disp: , Rfl:   •  famotidine (PEPCID) 40 MG tablet, TAKE ONE TABLET BY MOUTH ONCE EVERY DAY FOR GERD/REFLUX, Disp: 30 tablet, Rfl: 5  •  ibuprofen (MOTRIN) 400 mg tablet, Take 600 mg by mouth every 6 (six) hours as needed, Disp: , Rfl:   •  losartan (COZAAR) 100 MG tablet, Take 100 mg by mouth daily, Disp: , Rfl:   •  menthol-zinc oxide (CALMOSEPTINE) 0.44-20.6 % OINT, Apply topically, Disp: , Rfl:   •  montelukast (SINGULAIR) 10 mg tablet, Take 10 mg by mouth, Disp: , Rfl:   •  omeprazole (PriLOSEC) 40 MG capsule, TAKE ONE CAPSULE BY MOUTH ONCE EVERY DAY  FOR GERD/REFLUX, Disp: 90 capsule, Rfl: 1  •  polyethylene glycol (GLYCOLAX) 17 GM/SCOOP powder, Take 17 g by mouth, Disp: , Rfl:   •  psyllium (METAMUCIL) 0.52 g capsule, Take 0.52 g by mouth 2 (two) times a day, Disp: , Rfl:   •  risperiDONE (RisperDAL) 0.5 mg tablet, Take 0.5 mg by mouth 2 (two) times a day, Disp: , Rfl:   •  risperiDONE (RisperDAL) 1 mg tablet, Take 1 mg by mouth 2 (two) times a day, Disp: , Rfl:   •  STARCH, THICKENING, PO, Use daily for all liquids, nectar thick consistency, Disp: , Rfl:   •  tamsulosin (FLOMAX) 0.4 mg, Take 0.8 mg by mouth, Disp: , Rfl:   •  bisacodyl (DULCOLAX) 5 mg EC tablet, Take as directed as per written office instructions (Patient not taking: Reported on 8/30/2023), Disp: 2 tablet, Rfl: 0  •  dicyclomine (BENTYL) 10 mg capsule, Take 1 capsule (10 mg total) by mouth 4 (four) times a day as needed (abdominal pain) (Patient not taking: Reported on 8/30/2023), Disp: 60 capsule, Rfl: 2  •  furosemide (LASIX) 20 mg tablet, Take 1 tablet by mouth daily as needed, Disp: , Rfl:   •  losartan (COZAAR) 100 MG tablet, Take 25 mg by mouth daily (Patient not taking: Reported on 8/30/2023), Disp: , Rfl:   •  polyethylene glycol (GOLYTELY) 4000 mL solution, Take 4,000 mL by mouth once for 1 dose, Disp: 4000 mL, Rfl: 0  •  senna (SENOKOT) 8.6 mg, Take 2 tablets (17.2 mg total) by mouth daily at bedtime as needed for constipation, Disp: 60 tablet, Rfl: 0  •  sucralfate (CARAFATE) 1 g tablet, Take 1 tablet (1 g total) by mouth 4 (four) times a day (Patient not taking: Reported on 8/30/2023), Disp: 60 tablet, Rfl: 0  Allergies   Allergen Reactions   • Bee Venom Anaphylaxis, Hives and Swelling     Vitals:    01/11/24 0928   BP: 130/70   Pulse: 69   Temp: 98.6 °F (37 °C)   SpO2: 98%       Physical Exam  Constitutional: General appearance: The Patient is well-developed and well-nourished who appears the stated age in no acute distress. Patient is pleasant and talkative.     HEENT:   Normocephalic.  Sclerae are anicteric. Mucous membranes are moist. Neck is supple without adenopathy. No JVD.     Chest: The lungs are clear to auscultation.     Cardiac: Heart is regular rate.     Abdomen: Abdomen is soft, non-tender, non-distended and without masses.     Extremities: There is no clubbing or cyanosis. There is no edema.  Symmetric.  Neuro: Grossly nonfocal. Gait is normal with a walker.     Lymphatic: No evidence of cervical adenopathy bilaterally.    Skin: Warm, anicteric.    Psych:  Patient is pleasant and talkative.  Breasts:        Pathology:  [unfilled]    Labs:      Imaging  CT abdomen pelvis w contrast    Result Date: 1/8/2024  Narrative: CT ABDOMEN AND PELVIS WITH IV CONTRAST INDICATION:   Neuroendocrine tumor tail of the pancreas. K86.9: Disease of pancreas, unspecified. COMPARISON: CT chest abdomen pelvis 6/3/2023, outside CT chest 10/13/2023 TECHNIQUE:  CT examination of the abdomen and pelvis was performed.  In addition to arterial phase postcontrast scanning through the abdomen, delayed portal venous phase postcontrast scanning was performed through the abdomen pelvis. Multiplanar 2D reformatted images were created from the source data. This examination, like all CT scans performed in the [facility], was performed utilizing techniques to minimize radiation dose exposure, including the use of iterative reconstruction and automated exposure control. Radiation dose length product (DLP) for this visit:  1546.22 mGy-cm IV Contrast:  100 mL of iohexol (OMNIPAQUE) Enteric Contrast:  Enteric contrast was not administered. FINDINGS: ABDOMEN LOWER CHEST: There is a small right thick walled loculated pleural effusion. This is slightly smaller compared to the outside chest CT exam, question post intervention appearance. Previously there was a larger right pleural effusion without loculation. There is a nodule posterior to the right costophrenic sulcus medially measuring 1.1 x  0.9 cm image 31 series 301, stable from outside CT previously 1.3 x 0.8 cm. This is probably a reactive lymph node. There is a stable hyperdense 1 cm nodule in the left lower lobe. LIVER/BILIARY TREE: On the arterial phase images there is now a patchy hyperdense area in the right lobe of the liver laterally measuring 3.7 x 1.9 cm image 47 series 301. This is not apparent on the portal venous phase images. Question region of transhepatic attenuation difference. No biliary ductal dilatation.. GALLBLADDER:  There are gallstone(s) within the gallbladder, without pericholecystic inflammatory changes. SPLEEN:  Unremarkable. PANCREAS: Arterially enhance rounded pancreatic tail lesion measures 1.6 x 1.4 cm image 47 series 301, unchanged. Pancreas otherwise unremarkable. ADRENAL GLANDS:  Unremarkable. KIDNEYS/URETERS:  No hydronephrosis or urinary tract calculus.  One or more sharply circumscribed subcentimeter renal hypodensities are present, too small to accurately characterize, and statistically most likely benign findings. According to recent literature (Radiology 2019) no further workup of these findings is recommended. Nonspecific mild bilateral perinephric stranding. STOMACH AND BOWEL: Moderate fecal retention in the colon. APPENDIX:  No findings to suggest appendicitis. ABDOMINOPELVIC CAVITY:  No ascites.  No pneumoperitoneum.  No lymphadenopathy. VESSELS: 1.9 cm splenic artery aneurysm, stable, previously 1.8 cm. PELVIS REPRODUCTIVE ORGANS:  Unremarkable for patient's age. URINARY BLADDER: Underdistended limiting evaluation. Anterior bladder wall may be tethered into the left inguinal hernia. ABDOMINAL WALL/INGUINAL REGIONS: Moderate size fat-containing left inguinal hernia, stable. Small fat-containing umbilical hernia, stable. OSSEOUS STRUCTURES:  No acute fracture or destructive osseous lesion.     Impression: 1. Unchanged enhancing pancreatic tail nodule measuring up to 1.6 cm, again likely a pancreatic  neuroendocrine tumor. 2. Now seen in the right lobe of the liver is a patchy hyperdense arterially enhancing area laterally measuring 3.7 x 1.9 cm. This does not persist on the portal venous phase images. Question region of transhepatic attenuation difference. This can be reassessed on follow-up. 3. Stable splenic artery aneurysm measuring 1.9 cm in size, previously 1.8 cm. 4. Slightly smaller loculated right pleural effusion compared to outside CT chest of 10/13/2023. Question post intervention appearance, a chronic inflammatory process is not excluded. Workstation performed: [id number]     FL barium swallow video w speech    Result Date: 1/8/2024  Narrative: History: Dysphagia. Technique: Fluoroscopic guided video radiography speech swallowing study was performed utilizing liquid and solid food products of variable consistency, mixed with barium solution. Findings: There was transient penetration seen with thin liquids administered by spoon and single serving via cup, with a single episode of silent aspiration with successive swallows of thin liquids administered by cup. Penetration to the cords with eventual silent aspiration was seen with nectar thick consistency administered by cup as well. There is transient penetration present with honey thick liquids administered by cup. 3.4 minutes of fluoroscopy time is utilized the radiation dose of 53.37 mGy (CAK).    Impression: Impression: 1. Silent aspiration seen with successive swallows of thin liquids as well as nectar thick consistency, both administered by cup. 2. Transient penetration with thin liquids administered by spoon and single serving via cup, and with honey thick liquids administered by cup. Please refer to the separate speech pathology report for further evaluation. Workstation:[id number]    I personally reviewed and interpreted the above laboratory and imaging data.    Discussion/Summary: 58-year-old male with a presumed neuroendocrine tumor of the  pancreas.  By imaging this is stable.  By EUS both lesions seen appear to be vascular.  I did explain that I am slightly concerned that these may both be splenic artery aneurysms, even though the EUS revealed the lesion as venous.  I once again recommended that he see vascular surgery to see if any intervention would be required based on the size of nearly 2 cm.  From a neuroendocrine standpoint, he is asymptomatic.  I would recommend continued observation if these were neuroendocrine tumors.  I will see him again in 6 months with a repeat CT.  He is agreeable to this plan.  All of his and his caregivers questions were answered.

## 2024-01-11 NOTE — TELEPHONE ENCOUNTER
Appointment Confirmation   Who are you speaking with? [facility]   If it is not the patient, are they listed on an active communication consent form? yes   Which provider is the appointment scheduled with?  Dr. Pérez   When is the appointment scheduled?  Please list date and time 7/16/24 @ 10am   At which location is the appointment scheduled to take place? yes   Did caller verbalize understanding of appointment details? yes

## 2024-04-15 ENCOUNTER — OFFICE VISIT (OUTPATIENT)
Dept: VASCULAR SURGERY | Facility: CLINIC | Age: 59
End: 2024-04-15
Payer: MEDICARE

## 2024-04-15 VITALS
DIASTOLIC BLOOD PRESSURE: 80 MMHG | OXYGEN SATURATION: 98 % | BODY MASS INDEX: 36.64 KG/M2 | HEIGHT: 66 IN | WEIGHT: 228 LBS | HEART RATE: 57 BPM | SYSTOLIC BLOOD PRESSURE: 122 MMHG

## 2024-04-15 DIAGNOSIS — I72.8 SPLENIC ARTERY ANEURYSM (HCC): Primary | ICD-10-CM

## 2024-04-15 DIAGNOSIS — F79 MENTAL IMPAIRMENT: ICD-10-CM

## 2024-04-15 DIAGNOSIS — F31.9 BIPOLAR AFFECTIVE DISORDER, REMISSION STATUS UNSPECIFIED (HCC): ICD-10-CM

## 2024-04-15 PROCEDURE — 99213 OFFICE O/P EST LOW 20 MIN: CPT | Performed by: SURGERY

## 2024-04-15 NOTE — PROGRESS NOTES
Assessment/Plan:    Pt is a 60 yo M w/ hx SBO, partial GERD, asthma, s/p ?umbilical hernia repair?, LUPIS, HTN, pleural effusion s/p thoracentesis, gout arthritis, BPH, bipolar disorder, mental impairment, morbid obesity, anemia, pancreatic lesion, esophageal thickening, splenic artery aneurysm     Splenic artery aneurysm (HCC)  -     Ambulatory Referral to Vascular Surgery  -reviewed CT scans from '23 as well as recent study from '24 which show a splenic artery aneurysm w/ calcified ring; it has grown only 1mm in almost a year  -discussed aneurysmal disease and indications for surgical treatment including size >3cm or rapid growth  -will continue surveillance for this on a yearly basis w/ [facility] (can also review any CT scans ordered for other indications)    GI symptoms  -followed by GI  -reviewed CTA which shows widely patent celiac, SMA, HOLDEN; I do not think there is malperfusion as source of abdominal symptoms    Pancreatic lesion  -presumed to be neuroendocrine tumor  -follow w/ Dr. Pérez    Mental impairment  Bipolar affective disorder, remission status unspecified (HCC)  -lives in group home and comes in with his     Medication  -considered starting aspirin 81mg once daily for aneurysmal disease but given his significant GI issues, will defer this currently    Subjective:      Patient ID: Orlin John is a 59 y.o. male.    PT presents to office to review CT of abd and pelvis done 12/28/2023. Pt c/o abd pain after eating. Denies decrease in appetite, weightloss , nausea and fear of eating. Pt is not on pert meds and is a nonsmoker.     HPI:    Patient referred for splenic artery aneurysm in Aug '23.  Referred back for CT finding of 1mm growth.    COmplains of abdominal pain, nausea, no vomiting.  Follows with GI and on th8ick liquids to avoid aspiration.  Follows with Dr. Pérez for a pancreatic tail lesion favored as neuroendocrine tumor    Complains of SOB and has known loculated pleural effusion.   "Has chest pain with his CPAP.      He has BLE edema.  He walks minimally with a walker and mostly sits in a chair.    Patient lives in a group home and comes today with his \"\".     Never smoker.        The following portions of the patient's history were reviewed and updated as appropriate: allergies, current medications, past family history, past medical history, past social history, past surgical history, and problem list.    Review of Systems   Constitutional: Negative.    HENT: Negative.     Eyes: Negative.    Respiratory: Negative.     Cardiovascular: Negative.    Gastrointestinal: Negative.    Endocrine: Negative.    Genitourinary: Negative.    Musculoskeletal: Negative.    Skin: Negative.    Allergic/Immunologic: Negative.    Neurological: Negative.    Hematological: Negative.    Psychiatric/Behavioral: Negative.           Objective:      /80 (BP Location: Right arm, Patient Position: Sitting, Cuff Size: Large)   Pulse 57   Ht 5' 6\" (1.676 m)   Wt 103 kg (228 lb)   SpO2 98%   BMI 36.80 kg/m²          Physical Exam  Cardiovascular:      Rate and Rhythm: Normal rate and regular rhythm.      Pulses:           Radial pulses are 2+ on the right side and 2+ on the left side.      Heart sounds: No murmur heard.  Pulmonary:      Effort: No respiratory distress.      Breath sounds: No wheezing or rales.   Abdominal:      General: Abdomen is flat. There is no distension.      Tenderness: There is abdominal tenderness. There is no guarding or rebound.      Comments: Tenderness diffusely per patient, rated as severe, but patient appears comfortable and chatting   Psychiatric:      Comments: Flattened affect           I have reviewed and made appropriate changes to the review of systems input by the medical assistant.    Vitals:    04/15/24 1106   BP: 122/80   BP Location: Right arm   Patient Position: Sitting   Cuff Size: Large   Pulse: 57   SpO2: 98%   Weight: 103 kg (228 lb)   Height: 5' 6\" (1.676 " m)       Patient Active Problem List   Diagnosis   • Bipolar disorder (HCC)   • BPH (benign prostatic hyperplasia)   • Cardiomegaly   • Chronic diarrhea   • Essential hypertension   • Gouty arthritis of right great toe   • Mental impairment   • Mild persistent asthma without complication   • Morbid obesity (HCC)   • Normocytic anemia   • LUPIS (obstructive sleep apnea)   • Recurrent pleural effusion on right   • Small bowel obstruction, partial (HCC)   • Esophagitis   • Pancreatic lesion   • Aspiration pneumonitis (HCC)   • Acute anemia   • Splenic artery aneurysm (HCC)       Past Surgical History:   Procedure Laterality Date   • IR THORACENTESIS  5/25/2023       No family history on file.    Social History     Socioeconomic History   • Marital status: Single     Spouse name: Not on file   • Number of children: Not on file   • Years of education: Not on file   • Highest education level: Not on file   Occupational History   • Not on file   Tobacco Use   • Smoking status: Never   • Smokeless tobacco: Never   Vaping Use   • Vaping status: Never Used   Substance and Sexual Activity   • Alcohol use: Never   • Drug use: Never   • Sexual activity: Not on file   Other Topics Concern   • Not on file   Social History Narrative   • Not on file     Social Determinants of Health     Financial Resource Strain: Not on file   Food Insecurity: No Food Insecurity (5/26/2023)    Hunger Vital Sign    • Worried About Running Out of Food in the Last Year: Never true    • Ran Out of Food in the Last Year: Never true   Transportation Needs: No Transportation Needs (5/26/2023)    PRAPARE - Transportation    • Lack of Transportation (Medical): No    • Lack of Transportation (Non-Medical): No   Physical Activity: Not on file   Stress: Not on file   Social Connections: Not on file   Intimate Partner Violence: Not on file   Housing Stability: Low Risk  (5/26/2023)    Housing Stability Vital Sign    • Unable to Pay for Housing in the Last Year: No     • Number of Places Lived in the Last Year: 1    • Unstable Housing in the Last Year: No       Allergies   Allergen Reactions   • Bee Venom Anaphylaxis, Hives and Swelling         Current Outpatient Medications:   •  acetaminophen (TYLENOL) 500 mg tablet, Take 500 mg by mouth every 6 (six) hours as needed for mild pain, Disp: , Rfl:   •  allopurinol (ZYLOPRIM) 100 mg tablet, Take 100 mg by mouth daily, Disp: , Rfl:   •  amLODIPine (NORVASC) 5 mg tablet, Take 5 mg by mouth daily, Disp: , Rfl:   •  benzonatate (TESSALON) 200 MG capsule, Take 200 mg by mouth 3 (three) times a day as needed for cough, Disp: , Rfl:   •  chlorhexidine (Hibiclens) 4 % external liquid, Use to wash body daily for 5 days prior to scheduled surgery.  Use new wash cloth each time., Disp: , Rfl:   •  Colchicine 0.6 MG CAPS, Take 0.6 mg by mouth daily, Disp: , Rfl:   •  divalproex sodium (DEPAKOTE) 500 mg DR tablet, Take 500 mg by mouth 2 (two) times a day, Disp: , Rfl:   •  EPINEPHrine (EPIPEN) 0.3 mg/0.3 mL SOAJ, Inject 0.3 mg into a muscle, Disp: , Rfl:   •  escitalopram (LEXAPRO) 20 mg tablet, Take 20 mg by mouth daily, Disp: , Rfl:   •  furosemide (LASIX) 20 mg tablet, Take 1 tablet by mouth daily as needed, Disp: , Rfl:   •  ibuprofen (MOTRIN) 400 mg tablet, Take 600 mg by mouth every 6 (six) hours as needed, Disp: , Rfl:   •  losartan (COZAAR) 100 MG tablet, Take 100 mg by mouth daily, Disp: , Rfl:   •  menthol-zinc oxide (CALMOSEPTINE) 0.44-20.6 % OINT, Apply topically, Disp: , Rfl:   •  montelukast (SINGULAIR) 10 mg tablet, Take 10 mg by mouth, Disp: , Rfl:   •  omeprazole (PriLOSEC) 40 MG capsule, TAKE ONE CAPSULE BY MOUTH ONCE EVERY DAY FOR GERD/REFLUX, Disp: 90 capsule, Rfl: 1  •  polyethylene glycol (GLYCOLAX) 17 GM/SCOOP powder, Take 17 g by mouth, Disp: , Rfl:   •  polyethylene glycol (GOLYTELY) 4000 mL solution, Take 4,000 mL by mouth once for 1 dose, Disp: 4000 mL, Rfl: 0  •  psyllium (METAMUCIL) 0.52 g capsule, Take 0.52 g by  mouth 2 (two) times a day, Disp: , Rfl:   •  risperiDONE (RisperDAL) 0.5 mg tablet, Take 0.5 mg by mouth 2 (two) times a day, Disp: , Rfl:   •  risperiDONE (RisperDAL) 1 mg tablet, Take 1 mg by mouth 2 (two) times a day, Disp: , Rfl:   •  senna (SENOKOT) 8.6 mg, Take 2 tablets (17.2 mg total) by mouth daily at bedtime as needed for constipation, Disp: 60 tablet, Rfl: 0  •  STARCH, THICKENING, PO, Use daily for all liquids, nectar thick consistency, Disp: , Rfl:   •  sucralfate (CARAFATE) 1 g tablet, Take 1 tablet (1 g total) by mouth 4 (four) times a day, Disp: 60 tablet, Rfl: 0  •  tamsulosin (FLOMAX) 0.4 mg, Take 0.8 mg by mouth, Disp: , Rfl:   •  bisacodyl (DULCOLAX) 5 mg EC tablet, Take as directed as per written office instructions (Patient not taking: Reported on 8/30/2023), Disp: 2 tablet, Rfl: 0  •  dicyclomine (BENTYL) 10 mg capsule, Take 1 capsule (10 mg total) by mouth 4 (four) times a day as needed (abdominal pain) (Patient not taking: Reported on 8/30/2023), Disp: 60 capsule, Rfl: 2  •  diphenhydrAMINE (BENADRYL) 50 mg capsule, Take 50 mg by mouth every 6 (six) hours as needed (Patient not taking: Reported on 4/15/2024), Disp: , Rfl:   •  diphenoxylate-atropine (LOMOTIL) 2.5-0.025 mg per tablet, Take 2 tablets by mouth 2 (two) times a day as needed (Patient not taking: Reported on 4/15/2024), Disp: , Rfl:   •  famotidine (PEPCID) 40 MG tablet, TAKE ONE TABLET BY MOUTH ONCE EVERY DAY FOR GERD/REFLUX (Patient not taking: Reported on 4/15/2024), Disp: 30 tablet, Rfl: 5  •  losartan (COZAAR) 100 MG tablet, Take 25 mg by mouth daily (Patient not taking: Reported on 8/30/2023), Disp: , Rfl:

## 2024-04-15 NOTE — PATIENT INSTRUCTIONS
Your splenic artery aneurysm is stable in size.  We will continue to monitor this with ultrasound on a yearly basis  I will see you again in 1 year

## 2024-05-09 DIAGNOSIS — K21.00 GASTROESOPHAGEAL REFLUX DISEASE WITH ESOPHAGITIS WITHOUT HEMORRHAGE: ICD-10-CM

## 2024-05-09 RX ORDER — OMEPRAZOLE 40 MG/1
CAPSULE, DELAYED RELEASE ORAL
Qty: 90 CAPSULE | Refills: 1 | Status: SHIPPED | OUTPATIENT
Start: 2024-05-09

## 2024-06-07 DIAGNOSIS — K21.9 GASTROESOPHAGEAL REFLUX DISEASE WITHOUT ESOPHAGITIS: ICD-10-CM

## 2024-06-07 RX ORDER — FAMOTIDINE 40 MG/1
TABLET, FILM COATED ORAL
Qty: 30 TABLET | Refills: 5 | Status: SHIPPED | OUTPATIENT
Start: 2024-06-07

## 2024-06-28 ENCOUNTER — TELEPHONE (OUTPATIENT)
Dept: SURGICAL ONCOLOGY | Facility: CLINIC | Age: 59
End: 2024-06-28

## 2024-07-30 ENCOUNTER — OFFICE VISIT (OUTPATIENT)
Dept: SURGICAL ONCOLOGY | Facility: CLINIC | Age: 59
End: 2024-07-30
Payer: MEDICARE

## 2024-07-30 VITALS
DIASTOLIC BLOOD PRESSURE: 86 MMHG | HEART RATE: 88 BPM | OXYGEN SATURATION: 98 % | TEMPERATURE: 97.3 F | RESPIRATION RATE: 18 BRPM | BODY MASS INDEX: 36.8 KG/M2 | SYSTOLIC BLOOD PRESSURE: 126 MMHG | HEIGHT: 66 IN | WEIGHT: 229 LBS

## 2024-07-30 DIAGNOSIS — K86.9 PANCREATIC LESION: Primary | ICD-10-CM

## 2024-07-30 PROCEDURE — 99214 OFFICE O/P EST MOD 30 MIN: CPT | Performed by: SURGERY

## 2024-07-30 NOTE — LETTER
July 30, 2024     Gadiel Burrows DO  [address] [place] [id number]    Patient: Orlin John   YOB: 1965   Date of Visit: 7/30/2024       Dear Dr. Burrows:    Thank you for referring Orlin John to me for evaluation. Below are my notes for this consultation.    If you have questions, please do not hesitate to call me. I look forward to following your patient along with you.         Sincerely,        Edison Pérez MD        CC: No Recipients    Edison Pérez MD  7/30/2024  2:31 PM  Sign when Signing Visit               Surgical Oncology Follow Up       [facility] [place]  [address] [facility] [address]  [phone]    Orlin John  1965  [id number]  [facility]  [address] [facility] [address]  [phone]    1. 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  By imaging this is stable.  By EUS both lesions seen appear to be vascular.  I discussed that on my review of his most recent CTA I suspect these are splenic artery aneurysms rather than neuroendocrine tumor.  He has already seen vascular surgery and observation has been recommended.  From a neuroendocrine standpoint, he is asymptomatic.  I would recommend continued observation if these were neuroendocrine tumors.  I will see him again in 1 year with a repeat CT.  He is agreeable to this plan.  All of his and his caregivers questions were answered.   Orders:  -     CTA abdomen w wo contrast; Future; Expected date: 07/01/2025         Chief Complaint   Patient presents with   • office visit       Return in about 1 year (around 7/30/2025) for Ofice visit short, Imaging - See orders, with Shayy.      Oncology History    No history exists.       Staging: Presumed pancreatic neuroendocrine tumor, 1.6  cm  Treatment history: EUS, November 2023  No solid masses seen.  Current treatment: Observation  Disease status:      History of Present Illness: Patient returns in follow-up of his presumed neuroendocrine tumor.  He is feeling well.  No abdominal pain, nausea or vomiting.  CTA from June 18, 2024 reveals a stable 1.7 cm lesion in the pancreatic tail.  On this imaging, this was possibly a splenic artery aneurysm.  There was an adjacent 2.1 cm splenic artery aneurysm.  I personally reviewed the films.  He did see vascular surgery and surveillance was recommended.    Review of Systems  Complete ROS Surg Onc:   Complete ROS Surg Onc:   Constitutional: The patient denies new or recent history of general fatigue, no recent weight loss, no change in appetite.   Eyes: No complaints of visual problems, no scleral icterus.   ENT: no complaints of ear pain, no hoarseness, no difficulty swallowing,  no tinnitus and no new masses in head, oral cavity, or neck.   Cardiovascular: No complaints of chest pain, no palpitations, no ankle edema.   Respiratory: No complaints of shortness of breath, no cough.   Gastrointestinal: No complaints of jaundice, no bloody stools, no pale stools.   Genitourinary: No complaints of dysuria, no hematuria, no nocturia, no frequent urination, no urethral discharge.   Musculoskeletal: No complaints of weakness, paralysis, joint stiffness or arthralgias.  Integumentary: No complaints of rash, no new lesions.   Neurological: No complaints of convulsions, no seizures, no dizziness.   Hematologic/Lymphatic: No complaints of easy bruising.   Endocrine:  No hot or cold intolerance.  No polydipsia, polyphagia, or polyuria.  Allergy/immunology:  No environmental allergies.  No food allergies.  Not immunocompromised.  Skin:  No pallor or rash.  No wound.        Patient Active Problem List   Diagnosis   • Bipolar disorder (HCC)   • BPH (benign prostatic hyperplasia)   • Cardiomegaly   • Chronic diarrhea   •  Essential hypertension   • Gouty arthritis of right great toe   • Mental impairment   • Mild persistent asthma without complication   • Morbid obesity (HCC)   • Normocytic anemia   • LUPIS (obstructive sleep apnea)   • Recurrent pleural effusion on right   • Small bowel obstruction, partial (HCC)   • Esophagitis   • Pancreatic lesion   • Aspiration pneumonitis (HCC)   • Acute anemia   • Splenic artery aneurysm (HCC)     Past Medical History:   Diagnosis Date   • Arthritis    • Bipolar disorder (HCC)    • Depression    • Hypertension      Past Surgical History:   Procedure Laterality Date   • IR THORACENTESIS  5/25/2023     History reviewed. No pertinent family history.  Social History     Socioeconomic History   • Marital status: Single     Spouse name: Not on file   • Number of children: Not on file   • Years of education: Not on file   • Highest education level: Not on file   Occupational History   • Not on file   Tobacco Use   • Smoking status: Never   • Smokeless tobacco: Never   Vaping Use   • Vaping status: Never Used   Substance and Sexual Activity   • Alcohol use: Never   • Drug use: Never   • Sexual activity: Not on file   Other Topics Concern   • Not on file   Social History Narrative   • Not on file     Social Determinants of Health     Financial Resource Strain: Not on file   Food Insecurity: No Food Insecurity (5/26/2023)    Hunger Vital Sign    • Worried About Running Out of Food in the Last Year: Never true    • Ran Out of Food in the Last Year: Never true   Transportation Needs: No Transportation Needs (5/26/2023)    PRAPARE - Transportation    • Lack of Transportation (Medical): No    • Lack of Transportation (Non-Medical): No   Physical Activity: Not on file   Stress: Not on file   Social Connections: Not on file   Intimate Partner Violence: Not on file   Housing Stability: Low Risk  (5/26/2023)    Housing Stability Vital Sign    • Unable to Pay for Housing in the Last Year: No    • Number of Places  Lived in the Last Year: 1    • Unstable Housing in the Last Year: No       Current Outpatient Medications:   •  acetaminophen (TYLENOL) 500 mg tablet, Take 500 mg by mouth every 6 (six) hours as needed for mild pain, Disp: , Rfl:   •  amLODIPine (NORVASC) 5 mg tablet, Take 5 mg by mouth daily, Disp: , Rfl:   •  benzonatate (TESSALON) 200 MG capsule, Take 200 mg by mouth 3 (three) times a day as needed for cough, Disp: , Rfl:   •  chlorhexidine (Hibiclens) 4 % external liquid, Use to wash body daily for 5 days prior to scheduled surgery.  Use new wash cloth each time., Disp: , Rfl:   •  Colchicine 0.6 MG CAPS, Take 0.6 mg by mouth daily, Disp: , Rfl:   •  dicyclomine (BENTYL) 10 mg capsule, Take 1 capsule (10 mg total) by mouth 4 (four) times a day as needed (abdominal pain), Disp: 60 capsule, Rfl: 2  •  divalproex sodium (DEPAKOTE) 500 mg DR tablet, Take 500 mg by mouth 2 (two) times a day, Disp: , Rfl:   •  EPINEPHrine (EPIPEN) 0.3 mg/0.3 mL SOAJ, Inject 0.3 mg into a muscle, Disp: , Rfl:   •  escitalopram (LEXAPRO) 20 mg tablet, Take 20 mg by mouth daily, Disp: , Rfl:   •  famotidine (PEPCID) 40 MG tablet, TAKE ONE TABLET BY MOUTH ONCE EVERY DAY FOR GERD/REFLUX, Disp: 30 tablet, Rfl: 5  •  ibuprofen (MOTRIN) 400 mg tablet, Take 600 mg by mouth every 6 (six) hours as needed, Disp: , Rfl:   •  menthol-zinc oxide (CALMOSEPTINE) 0.44-20.6 % OINT, Apply topically, Disp: , Rfl:   •  montelukast (SINGULAIR) 10 mg tablet, Take 10 mg by mouth, Disp: , Rfl:   •  omeprazole (PriLOSEC) 40 MG capsule, TAKE ONE CAPSULE BY MOUTH ONCE EVERY DAY FOR GERD/REFLUX, Disp: 90 capsule, Rfl: 1  •  psyllium (METAMUCIL) 0.52 g capsule, Take 0.52 g by mouth 2 (two) times a day, Disp: , Rfl:   •  risperiDONE (RisperDAL) 0.5 mg tablet, Take 0.5 mg by mouth 2 (two) times a day, Disp: , Rfl:   •  risperiDONE (RisperDAL) 1 mg tablet, Take 1 mg by mouth 2 (two) times a day, Disp: , Rfl:   •  STARCH, THICKENING, PO, Use daily for all liquids,  nectar thick consistency, Disp: , Rfl:   •  sucralfate (CARAFATE) 1 g tablet, Take 1 tablet (1 g total) by mouth 4 (four) times a day, Disp: 60 tablet, Rfl: 0  •  allopurinol (ZYLOPRIM) 100 mg tablet, Take 100 mg by mouth daily, Disp: , Rfl:   •  bisacodyl (DULCOLAX) 5 mg EC tablet, Take as directed as per written office instructions (Patient not taking: Reported on 8/30/2023), Disp: 2 tablet, Rfl: 0  •  diphenhydrAMINE (BENADRYL) 50 mg capsule, Take 50 mg by mouth every 6 (six) hours as needed (Patient not taking: Reported on 4/15/2024), Disp: , Rfl:   •  diphenoxylate-atropine (LOMOTIL) 2.5-0.025 mg per tablet, Take 2 tablets by mouth 2 (two) times a day as needed (Patient not taking: Reported on 4/15/2024), Disp: , Rfl:   •  furosemide (LASIX) 20 mg tablet, Take 1 tablet by mouth daily as needed, Disp: , Rfl:   •  losartan (COZAAR) 100 MG tablet, Take 25 mg by mouth daily (Patient not taking: Reported on 8/30/2023), Disp: , Rfl:   •  losartan (COZAAR) 100 MG tablet, Take 100 mg by mouth daily, Disp: , Rfl:   •  polyethylene glycol (GLYCOLAX) 17 GM/SCOOP powder, Take 17 g by mouth, Disp: , Rfl:   •  polyethylene glycol (GOLYTELY) 4000 mL solution, Take 4,000 mL by mouth once for 1 dose, Disp: 4000 mL, Rfl: 0  •  senna (SENOKOT) 8.6 mg, Take 2 tablets (17.2 mg total) by mouth daily at bedtime as needed for constipation, Disp: 60 tablet, Rfl: 0  •  tamsulosin (FLOMAX) 0.4 mg, Take 0.8 mg by mouth, Disp: , Rfl:   Allergies   Allergen Reactions   • Bee Venom Anaphylaxis, Hives and Swelling     Vitals:    07/30/24 1403   BP: 126/86   Pulse: 88   Resp: 18   Temp: (!) 97.3 °F (36.3 °C)   SpO2: 98%       Physical Exam  Constitutional: General appearance: The Patient is well-developed and well-nourished who appears the stated age in no acute distress. Patient is pleasant and talkative.     HEENT:  Normocephalic.  Sclerae are anicteric. Mucous membranes are moist. Neck is supple without adenopathy. No JVD.     Chest: The  lungs are clear to auscultation.     Cardiac: Heart is regular rate.     Abdomen: Abdomen is soft, non-tender, non-distended and without masses.     Extremities: There is no clubbing or cyanosis. There is no edema.  Symmetric.  Neuro: Grossly nonfocal. Gait is normal with a walker.     Lymphatic: No evidence of cervical adenopathy bilaterally.   No evidence of axillary adenopathy bilaterally.   Skin: Warm, anicteric.    Psych:  Patient is pleasant and talkative.  Breasts:        Pathology:  [unfilled]    Labs:      Imaging  No results found.  I personally reviewed and interpreted the above laboratory and imaging data.

## 2024-07-30 NOTE — PROGRESS NOTES
Surgical Oncology Follow Up       [facility] [place]  [address] [facility] [address]  [phone]    Orlin John  1965  [phone]  [facility] [place]  [address] [facility] [address]  [phone]    1. 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  By imaging this is stable.  By EUS both lesions seen appear to be vascular.  I discussed that on my review of his most recent CTA I suspect these are splenic artery aneurysms rather than neuroendocrine tumor.  He has already seen vascular surgery and observation has been recommended.  From a neuroendocrine standpoint, he is asymptomatic.  I would recommend continued observation if these were neuroendocrine tumors.  I will see him again in 1 year with a repeat CT.  He is agreeable to this plan.  All of his and his caregivers questions were answered.   Orders:  -     CTA abdomen w wo contrast; Future; Expected date: 07/01/2025         Chief Complaint   Patient presents with    office visit       Return in about 1 year (around 7/30/2025) for Ofice visit short, Imaging - See orders, with Shayy.      Oncology History    No history exists.       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  Current treatment: Observation  Disease status:      History of Present Illness: Patient returns in follow-up of his presumed neuroendocrine tumor.  He is feeling well.  No abdominal pain, nausea or vomiting.  CTA from June 18, 2024 reveals a stable 1.7 cm lesion in the pancreatic tail.  On this imaging, this was possibly a splenic artery aneurysm.  There was an adjacent 2.1 cm splenic artery aneurysm.  I personally reviewed the films.  He did see vascular surgery and surveillance was  recommended.    Review of Systems  Complete ROS Surg Onc:   Complete ROS Surg Onc:   Constitutional: The patient denies new or recent history of general fatigue, no recent weight loss, no change in appetite.   Eyes: No complaints of visual problems, no scleral icterus.   ENT: no complaints of ear pain, no hoarseness, no difficulty swallowing,  no tinnitus and no new masses in head, oral cavity, or neck.   Cardiovascular: No complaints of chest pain, no palpitations, no ankle edema.   Respiratory: No complaints of shortness of breath, no cough.   Gastrointestinal: No complaints of jaundice, no bloody stools, no pale stools.   Genitourinary: No complaints of dysuria, no hematuria, no nocturia, no frequent urination, no urethral discharge.   Musculoskeletal: No complaints of weakness, paralysis, joint stiffness or arthralgias.  Integumentary: No complaints of rash, no new lesions.   Neurological: No complaints of convulsions, no seizures, no dizziness.   Hematologic/Lymphatic: No complaints of easy bruising.   Endocrine:  No hot or cold intolerance.  No polydipsia, polyphagia, or polyuria.  Allergy/immunology:  No environmental allergies.  No food allergies.  Not immunocompromised.  Skin:  No pallor or rash.  No wound.        Patient Active Problem List   Diagnosis    Bipolar disorder (HCC)    BPH (benign prostatic hyperplasia)    Cardiomegaly    Chronic diarrhea    Essential hypertension    Gouty arthritis of right great toe    Mental impairment    Mild persistent asthma without complication    Morbid obesity (HCC)    Normocytic anemia    LUPIS (obstructive sleep apnea)    Recurrent pleural effusion on right    Small bowel obstruction, partial (HCC)    Esophagitis    Pancreatic lesion    Aspiration pneumonitis (HCC)    Acute anemia    Splenic artery aneurysm (HCC)     Past Medical History:   Diagnosis Date    Arthritis     Bipolar disorder (HCC)     Depression     Hypertension      Past Surgical History:   Procedure  Laterality Date    IR THORACENTESIS  5/25/2023     History reviewed. No pertinent family history.  Social History     Socioeconomic History    Marital status: Single     Spouse name: Not on file    Number of children: Not on file    Years of education: Not on file    Highest education level: Not on file   Occupational History    Not on file   Tobacco Use    Smoking status: Never    Smokeless tobacco: Never   Vaping Use    Vaping status: Never Used   Substance and Sexual Activity    Alcohol use: Never    Drug use: Never    Sexual activity: Not on file   Other Topics Concern    Not on file   Social History Narrative    Not on file     Social Determinants of Health     Financial Resource Strain: Not on file   Food Insecurity: No Food Insecurity (5/26/2023)    Hunger Vital Sign     Worried About Running Out of Food in the Last Year: Never true     Ran Out of Food in the Last Year: Never true   Transportation Needs: No Transportation Needs (5/26/2023)    PRAPARE - Transportation     Lack of Transportation (Medical): No     Lack of Transportation (Non-Medical): No   Physical Activity: Not on file   Stress: Not on file   Social Connections: Not on file   Intimate Partner Violence: Not on file   Housing Stability: Low Risk  (5/26/2023)    Housing Stability Vital Sign     Unable to Pay for Housing in the Last Year: No     Number of Places Lived in the Last Year: 1     Unstable Housing in the Last Year: No       Current Outpatient Medications:     acetaminophen (TYLENOL) 500 mg tablet, Take 500 mg by mouth every 6 (six) hours as needed for mild pain, Disp: , Rfl:     amLODIPine (NORVASC) 5 mg tablet, Take 5 mg by mouth daily, Disp: , Rfl:     benzonatate (TESSALON) 200 MG capsule, Take 200 mg by mouth 3 (three) times a day as needed for cough, Disp: , Rfl:     chlorhexidine (Hibiclens) 4 % external liquid, Use to wash body daily for 5 days prior to scheduled surgery.  Use new wash cloth each time., Disp: , Rfl:     Colchicine  0.6 MG CAPS, Take 0.6 mg by mouth daily, Disp: , Rfl:     dicyclomine (BENTYL) 10 mg capsule, Take 1 capsule (10 mg total) by mouth 4 (four) times a day as needed (abdominal pain), Disp: 60 capsule, Rfl: 2    divalproex sodium (DEPAKOTE) 500 mg DR tablet, Take 500 mg by mouth 2 (two) times a day, Disp: , Rfl:     EPINEPHrine (EPIPEN) 0.3 mg/0.3 mL SOAJ, Inject 0.3 mg into a muscle, Disp: , Rfl:     escitalopram (LEXAPRO) 20 mg tablet, Take 20 mg by mouth daily, Disp: , Rfl:     famotidine (PEPCID) 40 MG tablet, TAKE ONE TABLET BY MOUTH ONCE EVERY DAY FOR GERD/REFLUX, Disp: 30 tablet, Rfl: 5    ibuprofen (MOTRIN) 400 mg tablet, Take 600 mg by mouth every 6 (six) hours as needed, Disp: , Rfl:     menthol-zinc oxide (CALMOSEPTINE) 0.44-20.6 % OINT, Apply topically, Disp: , Rfl:     montelukast (SINGULAIR) 10 mg tablet, Take 10 mg by mouth, Disp: , Rfl:     omeprazole (PriLOSEC) 40 MG capsule, TAKE ONE CAPSULE BY MOUTH ONCE EVERY DAY FOR GERD/REFLUX, Disp: 90 capsule, Rfl: 1    psyllium (METAMUCIL) 0.52 g capsule, Take 0.52 g by mouth 2 (two) times a day, Disp: , Rfl:     risperiDONE (RisperDAL) 0.5 mg tablet, Take 0.5 mg by mouth 2 (two) times a day, Disp: , Rfl:     risperiDONE (RisperDAL) 1 mg tablet, Take 1 mg by mouth 2 (two) times a day, Disp: , Rfl:     STARCH, THICKENING, PO, Use daily for all liquids, nectar thick consistency, Disp: , Rfl:     sucralfate (CARAFATE) 1 g tablet, Take 1 tablet (1 g total) by mouth 4 (four) times a day, Disp: 60 tablet, Rfl: 0    allopurinol (ZYLOPRIM) 100 mg tablet, Take 100 mg by mouth daily, Disp: , Rfl:     bisacodyl (DULCOLAX) 5 mg EC tablet, Take as directed as per written office instructions (Patient not taking: Reported on 8/30/2023), Disp: 2 tablet, Rfl: 0    diphenhydrAMINE (BENADRYL) 50 mg capsule, Take 50 mg by mouth every 6 (six) hours as needed (Patient not taking: Reported on 4/15/2024), Disp: , Rfl:     diphenoxylate-atropine (LOMOTIL) 2.5-0.025 mg per tablet, Take  2 tablets by mouth 2 (two) times a day as needed (Patient not taking: Reported on 4/15/2024), Disp: , Rfl:     furosemide (LASIX) 20 mg tablet, Take 1 tablet by mouth daily as needed, Disp: , Rfl:     losartan (COZAAR) 100 MG tablet, Take 25 mg by mouth daily (Patient not taking: Reported on 8/30/2023), Disp: , Rfl:     losartan (COZAAR) 100 MG tablet, Take 100 mg by mouth daily, Disp: , Rfl:     polyethylene glycol (GLYCOLAX) 17 GM/SCOOP powder, Take 17 g by mouth, Disp: , Rfl:     polyethylene glycol (GOLYTELY) 4000 mL solution, Take 4,000 mL by mouth once for 1 dose, Disp: 4000 mL, Rfl: 0    senna (SENOKOT) 8.6 mg, Take 2 tablets (17.2 mg total) by mouth daily at bedtime as needed for constipation, Disp: 60 tablet, Rfl: 0    tamsulosin (FLOMAX) 0.4 mg, Take 0.8 mg by mouth, Disp: , Rfl:   Allergies   Allergen Reactions    Bee Venom Anaphylaxis, Hives and Swelling     Vitals:    07/30/24 1403   BP: 126/86   Pulse: 88   Resp: 18   Temp: (!) 97.3 °F (36.3 °C)   SpO2: 98%       Physical Exam  Constitutional: General appearance: The Patient is well-developed and well-nourished who appears the stated age in no acute distress. Patient is pleasant and talkative.     HEENT:  Normocephalic.  Sclerae are anicteric. Mucous membranes are moist. Neck is supple without adenopathy. No JVD.     Chest: The lungs are clear to auscultation.     Cardiac: Heart is regular rate.     Abdomen: Abdomen is soft, non-tender, non-distended and without masses.     Extremities: There is no clubbing or cyanosis. There is no edema.  Symmetric.  Neuro: Grossly nonfocal. Gait is normal with a walker.     Lymphatic: No evidence of cervical adenopathy bilaterally.   No evidence of axillary adenopathy bilaterally.   Skin: Warm, anicteric.    Psych:  Patient is pleasant and talkative.  Breasts:        Pathology:  [unfilled]    Labs:      Imaging  No results found.  I personally reviewed and interpreted the above laboratory and imaging  data.

## 2024-07-30 NOTE — ASSESSMENT & PLAN NOTE
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  By imaging this is stable.  By EUS both lesions seen appear to be vascular.  I discussed that on my review of his most recent CTA I suspect these are splenic artery aneurysms rather than neuroendocrine tumor.  He has already seen vascular surgery and observation has been recommended.  From a neuroendocrine standpoint, he is asymptomatic.  I would recommend continued observation if these were neuroendocrine tumors.  I will see him again in 1 year with a repeat CT.  He is agreeable to this plan.  All of his and his caregivers questions were answered.

## 2024-08-12 ENCOUNTER — HOSPITAL ENCOUNTER (EMERGENCY)
Facility: HOSPITAL | Age: 59
Discharge: HOME | End: 2024-08-12
Attending: EMERGENCY MEDICINE
Payer: MEDICARE

## 2024-08-12 ENCOUNTER — APPOINTMENT (EMERGENCY)
Dept: RADIOLOGY | Facility: HOSPITAL | Age: 59
End: 2024-08-12
Payer: MEDICARE

## 2024-08-12 VITALS
RESPIRATION RATE: 18 BRPM | WEIGHT: 230.82 LBS | TEMPERATURE: 98.8 F | OXYGEN SATURATION: 100 % | HEART RATE: 58 BPM | DIASTOLIC BLOOD PRESSURE: 73 MMHG | BODY MASS INDEX: 37.26 KG/M2 | SYSTOLIC BLOOD PRESSURE: 148 MMHG

## 2024-08-12 DIAGNOSIS — R07.9 CHEST PAIN: Primary | ICD-10-CM

## 2024-08-12 LAB
2HR DELTA HS TROPONIN: 1 NG/L
ALBUMIN SERPL BCG-MCNC: 3.6 G/DL (ref 3.5–5)
ALP SERPL-CCNC: 52 U/L (ref 34–104)
ALT SERPL W P-5'-P-CCNC: 7 U/L (ref 7–52)
ANION GAP SERPL CALCULATED.3IONS-SCNC: 6 MMOL/L (ref 4–13)
AST SERPL W P-5'-P-CCNC: 11 U/L (ref 13–39)
ATRIAL RATE: 56 BPM
BASOPHILS # BLD AUTO: 0.02 THOUSANDS/ÂΜL (ref 0–0.1)
BASOPHILS NFR BLD AUTO: 1 % (ref 0–1)
BILIRUB SERPL-MCNC: 0.32 MG/DL (ref 0.2–1)
BNP SERPL-MCNC: 170 PG/ML (ref 0–100)
BUN SERPL-MCNC: 16 MG/DL (ref 5–25)
CALCIUM SERPL-MCNC: 8.9 MG/DL (ref 8.4–10.2)
CARDIAC TROPONIN I PNL SERPL HS: 3 NG/L
CARDIAC TROPONIN I PNL SERPL HS: 4 NG/L
CHLORIDE SERPL-SCNC: 104 MMOL/L (ref 96–108)
CO2 SERPL-SCNC: 30 MMOL/L (ref 21–32)
CREAT SERPL-MCNC: 0.88 MG/DL (ref 0.6–1.3)
EOSINOPHIL # BLD AUTO: 0.08 THOUSAND/ÂΜL (ref 0–0.61)
EOSINOPHIL NFR BLD AUTO: 2 % (ref 0–6)
ERYTHROCYTE [DISTWIDTH] IN BLOOD BY AUTOMATED COUNT: 14.5 % (ref 11.6–15.1)
GFR SERPL CREATININE-BSD FRML MDRD: 95 ML/MIN/1.73SQ M
GLUCOSE SERPL-MCNC: 89 MG/DL (ref 65–140)
HCT VFR BLD AUTO: 37.7 % (ref 36.5–49.3)
HGB BLD-MCNC: 12.5 G/DL (ref 12–17)
IMM GRANULOCYTES # BLD AUTO: 0 THOUSAND/UL (ref 0–0.2)
IMM GRANULOCYTES NFR BLD AUTO: 0 % (ref 0–2)
LIPASE SERPL-CCNC: 15 U/L (ref 11–82)
LYMPHOCYTES # BLD AUTO: 1.2 THOUSANDS/ÂΜL (ref 0.6–4.47)
LYMPHOCYTES NFR BLD AUTO: 36 % (ref 14–44)
MCH RBC QN AUTO: 31.6 PG (ref 26.8–34.3)
MCHC RBC AUTO-ENTMCNC: 33.2 G/DL (ref 31.4–37.4)
MCV RBC AUTO: 95 FL (ref 82–98)
MONOCYTES # BLD AUTO: 0.45 THOUSAND/ÂΜL (ref 0.17–1.22)
MONOCYTES NFR BLD AUTO: 14 % (ref 4–12)
NEUTROPHILS # BLD AUTO: 1.59 THOUSANDS/ÂΜL (ref 1.85–7.62)
NEUTS SEG NFR BLD AUTO: 47 % (ref 43–75)
NRBC BLD AUTO-RTO: 0 /100 WBCS
P AXIS: 24 DEGREES
PLATELET # BLD AUTO: 198 THOUSANDS/UL (ref 149–390)
PMV BLD AUTO: 9.1 FL (ref 8.9–12.7)
POTASSIUM SERPL-SCNC: 3.9 MMOL/L (ref 3.5–5.3)
PR INTERVAL: 148 MS
PROT SERPL-MCNC: 6.8 G/DL (ref 6.4–8.4)
QRS AXIS: -21 DEGREES
QRSD INTERVAL: 96 MS
QT INTERVAL: 430 MS
QTC INTERVAL: 414 MS
RBC # BLD AUTO: 3.95 MILLION/UL (ref 3.88–5.62)
SODIUM SERPL-SCNC: 140 MMOL/L (ref 135–147)
T WAVE AXIS: 19 DEGREES
VENTRICULAR RATE: 56 BPM
WBC # BLD AUTO: 3.34 THOUSAND/UL (ref 4.31–10.16)

## 2024-08-12 PROCEDURE — 85025 COMPLETE CBC W/AUTO DIFF WBC: CPT | Performed by: EMERGENCY MEDICINE

## 2024-08-12 PROCEDURE — 83690 ASSAY OF LIPASE: CPT | Performed by: EMERGENCY MEDICINE

## 2024-08-12 PROCEDURE — 84484 ASSAY OF TROPONIN QUANT: CPT | Performed by: EMERGENCY MEDICINE

## 2024-08-12 PROCEDURE — 93010 ELECTROCARDIOGRAM REPORT: CPT | Performed by: INTERNAL MEDICINE

## 2024-08-12 PROCEDURE — 80053 COMPREHEN METABOLIC PANEL: CPT | Performed by: EMERGENCY MEDICINE

## 2024-08-12 PROCEDURE — 83880 ASSAY OF NATRIURETIC PEPTIDE: CPT | Performed by: EMERGENCY MEDICINE

## 2024-08-12 PROCEDURE — 71045 X-RAY EXAM CHEST 1 VIEW: CPT

## 2024-08-12 PROCEDURE — 36415 COLL VENOUS BLD VENIPUNCTURE: CPT | Performed by: EMERGENCY MEDICINE

## 2024-08-12 PROCEDURE — 99285 EMERGENCY DEPT VISIT HI MDM: CPT

## 2024-08-12 PROCEDURE — 99285 EMERGENCY DEPT VISIT HI MDM: CPT | Performed by: EMERGENCY MEDICINE

## 2024-08-12 PROCEDURE — 93005 ELECTROCARDIOGRAM TRACING: CPT

## 2024-08-12 RX ORDER — SODIUM CHLORIDE 9 MG/ML
3 INJECTION INTRAVENOUS
Status: DISCONTINUED | OUTPATIENT
Start: 2024-08-12 | End: 2024-08-12 | Stop reason: HOSPADM

## 2024-08-14 NOTE — ED PROVIDER NOTES
History  No chief complaint on file.    This is a 59-year-old male who presents to the emergency department complaining of chest pain.  He presents with his .  He complains of chest pain in the center of his chest.  He states it radiates down to his abdomen.  He denies fevers or chills.  He denies nausea or vomiting.  He denies diaphoresis.  He states he has had these pains starting today, but has had them in the past.  He does not know if they are associated with eating.        Prior to Admission Medications   Prescriptions Last Dose Informant Patient Reported? Taking?   Colchicine 0.6 MG CAPS  Outside Facility (Specify) Yes No   Sig: Take 0.6 mg by mouth daily   EPINEPHrine (EPIPEN) 0.3 mg/0.3 mL SOAJ  Outside Facility (Specify) Yes No   Sig: Inject 0.3 mg into a muscle   STARCH, THICKENING, PO   Yes No   Sig: Use daily for all liquids, nectar thick consistency   acetaminophen (TYLENOL) 500 mg tablet   Yes No   Sig: Take 500 mg by mouth every 6 (six) hours as needed for mild pain   allopurinol (ZYLOPRIM) 100 mg tablet  Outside Facility (Specify) Yes No   Sig: Take 100 mg by mouth daily   amLODIPine (NORVASC) 5 mg tablet   Yes No   Sig: Take 5 mg by mouth daily   benzonatate (TESSALON) 200 MG capsule   Yes No   Sig: Take 200 mg by mouth 3 (three) times a day as needed for cough   bisacodyl (DULCOLAX) 5 mg EC tablet  Outside Facility (Specify) No No   Sig: Take as directed as per written office instructions   Patient not taking: Reported on 8/30/2023   chlorhexidine (Hibiclens) 4 % external liquid   Yes No   Sig: Use to wash body daily for 5 days prior to scheduled surgery.  Use new wash cloth each time.   dicyclomine (BENTYL) 10 mg capsule  Outside Facility (Specify) No No   Sig: Take 1 capsule (10 mg total) by mouth 4 (four) times a day as needed (abdominal pain)   diphenhydrAMINE (BENADRYL) 50 mg capsule  Outside Facility (Specify) Yes No   Sig: Take 50 mg by mouth every 6 (six) hours as needed    Patient not taking: Reported on 4/15/2024   diphenoxylate-atropine (LOMOTIL) 2.5-0.025 mg per tablet  Outside Facility (Specify) Yes No   Sig: Take 2 tablets by mouth 2 (two) times a day as needed   Patient not taking: Reported on 4/15/2024   divalproex sodium (DEPAKOTE) 500 mg DR tablet  Outside Facility (Specify) Yes No   Sig: Take 500 mg by mouth 2 (two) times a day   escitalopram (LEXAPRO) 20 mg tablet  Outside Facility (Specify) Yes No   Sig: Take 20 mg by mouth daily   famotidine (PEPCID) 40 MG tablet   No No   Sig: TAKE ONE TABLET BY MOUTH ONCE EVERY DAY FOR GERD/REFLUX   furosemide (LASIX) 20 mg tablet  Outside Facility (Specify) Yes No   Sig: Take 1 tablet by mouth daily as needed   ibuprofen (MOTRIN) 400 mg tablet  Outside Facility (Specify) Yes No   Sig: Take 600 mg by mouth every 6 (six) hours as needed   losartan (COZAAR) 100 MG tablet  Outside Facility (Specify) Yes No   Sig: Take 25 mg by mouth daily   Patient not taking: Reported on 8/30/2023   losartan (COZAAR) 100 MG tablet  Outside Facility (Specify) Yes No   Sig: Take 100 mg by mouth daily   menthol-zinc oxide (CALMOSEPTINE) 0.44-20.6 % OINT  Outside Facility (Specify) Yes No   Sig: Apply topically   montelukast (SINGULAIR) 10 mg tablet  Outside Facility (Specify) Yes No   Sig: Take 10 mg by mouth   omeprazole (PriLOSEC) 40 MG capsule   No No   Sig: TAKE ONE CAPSULE BY MOUTH ONCE EVERY DAY FOR GERD/REFLUX   polyethylene glycol (GLYCOLAX) 17 GM/SCOOP powder  Outside Facility (Specify) Yes No   Sig: Take 17 g by mouth   polyethylene glycol (GOLYTELY) 4000 mL solution   No No   Sig: Take 4,000 mL by mouth once for 1 dose   psyllium (METAMUCIL) 0.52 g capsule   Yes No   Sig: Take 0.52 g by mouth 2 (two) times a day   risperiDONE (RisperDAL) 0.5 mg tablet  Outside Facility (Specify) Yes No   Sig: Take 0.5 mg by mouth 2 (two) times a day   risperiDONE (RisperDAL) 1 mg tablet  Outside Facility (Specify) Yes No   Sig: Take 1 mg by mouth 2 (two) times a  day   senna (SENOKOT) 8.6 mg   No No   Sig: Take 2 tablets (17.2 mg total) by mouth daily at bedtime as needed for constipation   sucralfate (CARAFATE) 1 g tablet  Outside Facility (Specify) No No   Sig: Take 1 tablet (1 g total) by mouth 4 (four) times a day   tamsulosin (FLOMAX) 0.4 mg  Outside Facility (Specify) Yes No   Sig: Take 0.8 mg by mouth      Facility-Administered Medications: None       Past Medical History:   Diagnosis Date    Arthritis     Bipolar disorder (HCC)     Depression     Hypertension        Past Surgical History:   Procedure Laterality Date    IR THORACENTESIS  5/25/2023       No family history on file.  I have reviewed and agree with the history as documented.    E-Cigarette/Vaping    E-Cigarette Use Never User      E-Cigarette/Vaping Substances    Nicotine No     THC No     CBD No     Flavoring No     Other No     Unknown No      Social History     Tobacco Use    Smoking status: Never    Smokeless tobacco: Never   Vaping Use    Vaping status: Never Used   Substance Use Topics    Alcohol use: Never    Drug use: Never       Review of Systems   All other systems reviewed and are negative.      Physical Exam  Physical Exam  Constitutional:  Vital signs reviewed, patient appears nontoxic, no acute distress  Eyes: Pupils equal round reactive to light and accommodation, extraocular muscles intact  HEENT: trachea midline, no JVD, moist mucous membranes  Respiratory: lung sounds clear throughout, no rhonchi, no rales  Cardiovascular: regular rate rhythm, no murmurs or rubs  Abdomen: soft, nontender, nondistended, no rebound or guarding  Back: no CVA tenderness, normal inspection  Extremities: no edema, pulses equal in all 4 extremities  Neuro: awake, alert, oriented, no focal weakness  Skin: warm, dry, intact, no rashes noted    Vital Signs  ED Triage Vitals [08/12/24 1538]   Temperature Pulse Respirations Blood Pressure SpO2   98.8 °F (37.1 °C) 58 18 148/73 100 %      Temp Source Heart Rate Source  Patient Position - Orthostatic VS BP Location FiO2 (%)   Oral Monitor Sitting Left arm --      Pain Score       10 - Worst Possible Pain           Vitals:    08/12/24 1538   BP: 148/73   Pulse: 58   Patient Position - Orthostatic VS: Sitting         Visual Acuity      ED Medications  Medications - No data to display    Diagnostic Studies  Results Reviewed       Procedure Component Value Units Date/Time    HS Troponin I 2hr [[id number]]  (Normal) Collected: 08/12/24 1830    Lab Status: Final result Specimen: Blood from Arm, Left Updated: 08/12/24 1855     hs TnI 2hr 4 ng/L      Delta 2hr hsTnI 1 ng/L     HS Troponin 0hr (reflex protocol) [[id number]]  (Normal) Collected: 08/12/24 1605    Lab Status: Final result Specimen: Blood from Arm, Left Updated: 08/12/24 1634     hs TnI 0hr 3 ng/L     B-Type Natriuretic Peptide(BNP) [[id number]]  (Abnormal) Collected: 08/12/24 1605    Lab Status: Final result Specimen: Blood from Arm, Left Updated: 08/12/24 1632      pg/mL     Comprehensive metabolic panel [[id number]]  (Abnormal) Collected: 08/12/24 1605    Lab Status: Final result Specimen: Blood from Arm, Left Updated: 08/12/24 1626     Sodium 140 mmol/L      Potassium 3.9 mmol/L      Chloride 104 mmol/L      CO2 30 mmol/L      ANION GAP 6 mmol/L      BUN 16 mg/dL      Creatinine 0.88 mg/dL      Glucose 89 mg/dL      Calcium 8.9 mg/dL      AST 11 U/L      ALT 7 U/L      Alkaline Phosphatase 52 U/L      Total Protein 6.8 g/dL      Albumin 3.6 g/dL      Total Bilirubin 0.32 mg/dL      eGFR 95 ml/min/1.73sq m     Narrative:      National Kid[employer] guidelines for Chronic Kidney Disease (CKD):     Stage 1 with normal or high GFR (GFR > 90 mL/min/1.73 square meters)    Stage 2 Mild CKD (GFR = 60-89 mL/min/1.73 square meters)    Stage 3A Moderate CKD (GFR = 45-59 mL/min/1.73 square meters)    Stage 3B Moderate CKD (GFR = 30-44 mL/min/1.73 square meters)    Stage 4 Severe CKD (GFR = 15-29 mL/min/1.73 square  meters)    Stage 5 End Stage CKD (GFR <15 mL/min/1.73 square meters)  Note: GFR calculation is accurate only with a steady state creatinine    Lipase [[id number]]  (Normal) Collected: 08/12/24 1605    Lab Status: Final result Specimen: Blood from Arm, Left Updated: 08/12/24 1626     Lipase 15 u/L     CBC and differential [[id number]]  (Abnormal) Collected: 08/12/24 1605    Lab Status: Final result Specimen: Blood from Arm, Left Updated: 08/12/24 1612     WBC 3.34 Thousand/uL      RBC 3.95 Million/uL      Hemoglobin 12.5 g/dL      Hematocrit 37.7 %      MCV 95 fL      MCH 31.6 pg      MCHC 33.2 g/dL      RDW 14.5 %      MPV 9.1 fL      Platelets 198 Thousands/uL      nRBC 0 /100 WBCs      Segmented % 47 %      Immature Grans % 0 %      Lymphocytes % 36 %      Monocytes % 14 %      Eosinophils Relative 2 %      Basophils Relative 1 %      Absolute Neutrophils 1.59 Thousands/µL      Absolute Immature Grans 0.00 Thousand/uL      Absolute Lymphocytes 1.20 Thousands/µL      Absolute Monocytes 0.45 Thousand/µL      Eosinophils Absolute 0.08 Thousand/µL      Basophils Absolute 0.02 Thousands/µL                    X-ray chest 1 view portable   ED Interpretation by Kumar Pereira DO (08/12 1631)   No pneumonia or pneumothorax      Final Result by Hugo Coleman MD (08/13 0608)      No acute cardiopulmonary disease.            Workstation performed: [id number]                    Procedures  ECG 12 Lead Documentation Only    Date/Time: 8/14/2024 3:32 PM    Performed by: Kumar Pereira DO  Authorized by: Kumar Pereira DO    ECG reviewed by me, the ED Provider: yes    Patient location:  ED  Comments:      Sinus bradycardia, rate of 56, normal ME, normal QTc, no STEMI, bradycardic compared to prior EKG dated October 3, 2023, EKG independently interpreted by me           ED Course  ED Course as of 08/14/24 1533   Mon Aug 12, 2024   1631 The patient had a chest x-ray that was independently interpreted by me that shows no  pneumonia or pneumothorax.   Wed Aug 14, 2024   1533 The patient was well-appearing on exam.  He had lab work that was significant for a white count of 3.34, a troponin of 3 with a delta of 1 at 2 hours, and a lipase of 15.  He had a nonischemic EKG.  He had a chest x-ray that was unremarkable.  I doubt ACS or arrhythmia.  The patient's had no further symptoms in the emergency department and was discharged with follow-up to his primary care physician.               HEART Risk Score      Flowsheet Row Most Recent Value   Heart Score Risk Calculator    History 1 Filed at: 08/12/2024 1900   ECG 1 Filed at: 08/12/2024 1900   Age 1 Filed at: 08/12/2024 1900   Risk Factors 1 Filed at: 08/12/2024 1900   Troponin 0 Filed at: 08/12/2024 1900   HEART Score 4 Filed at: 08/12/2024 1900                                        Medical Decision Making  This is a 59-year-old male presented to the emergency department planing of chest pain.  I considered ACS, pneumonia, pneumothorax, pancreatitis, GERD. These and other diagnoses were considered.         Amount and/or Complexity of Data Reviewed  Labs: ordered.  Radiology: ordered and independent interpretation performed.                 Disposition  Final diagnoses:   Chest pain     Time reflects when diagnosis was documented in both MDM as applicable and the Disposition within this note       Time User Action Codes Description Comment    8/12/2024  7:01 PM Kumar Pereira Add [R07.9] Chest pain           ED Disposition       ED Disposition   Discharge    Condition   Stable    Date/Time   Mon Aug 12, 2024 1900    Comment   Orlin John discharge to home/self care.                   Follow-up Information       Follow up With Specialties Details Why Contact Info Additional Information    Gadiel Burrows DO Pediatrics   [address] [place] [id number] [phone]       [facility] Emergency Department Emergency Medicine  If symptoms worsen [id number]  [facility] [id number]  [phone] [facility], [address] [place] [id number]            Discharge Medication List as of 8/12/2024  7:01 PM        CONTINUE these medications which have NOT CHANGED    Details   acetaminophen (TYLENOL) 500 mg tablet Take 500 mg by mouth every 6 (six) hours as needed for mild pain, Historical Med      allopurinol (ZYLOPRIM) 100 mg tablet Take 100 mg by mouth daily, Starting Fri 3/24/2023, Until Mon 4/15/2024, Historical Med      amLODIPine (NORVASC) 5 mg tablet Take 5 mg by mouth daily, Starting Fri 10/6/2023, Until Sat 10/5/2024, Historical Med      benzonatate (TESSALON) 200 MG capsule Take 200 mg by mouth 3 (three) times a day as needed for cough, Historical Med      bisacodyl (DULCOLAX) 5 mg EC tablet Take as directed as per written office instructions, Normal      chlorhexidine (Hibiclens) 4 % external liquid Use to wash body daily for 5 days prior to scheduled surgery.  Use new wash cloth each time., Historical Med      Colchicine 0.6 MG CAPS Take 0.6 mg by mouth daily, Starting Mon 4/24/2023, Historical Med      dicyclomine (BENTYL) 10 mg capsule Take 1 capsule (10 mg total) by mouth 4 (four) times a day as needed (abdominal pain), Starting Tue 8/8/2023, Normal      diphenhydrAMINE (BENADRYL) 50 mg capsule Take 50 mg by mouth every 6 (six) hours as needed, Starting Fri 3/17/2023, Historical Med      diphenoxylate-atropine (LOMOTIL) 2.5-0.025 mg per tablet Take 2 tablets by mouth 2 (two) times a day as needed, Starting Fri 3/17/2023, Historical Med      divalproex sodium (DEPAKOTE) 500 mg DR tablet Take 500 mg by mouth 2 (two) times a day, Historical Med      EPINEPHrine (EPIPEN) 0.3 mg/0.3 mL SOAJ Inject 0.3 mg into a muscle, Starting Fri 3/17/2023, Historical Med      escitalopram (LEXAPRO) 20 mg tablet Take 20 mg by mouth daily, Historical Med      famotidine (PEPCID) 40 MG tablet TAKE ONE TABLET BY MOUTH ONCE EVERY  DAY FOR GERD/REFLUX, Normal      furosemide (LASIX) 20 mg tablet Take 1 tablet by mouth daily as needed, Starting Fri 3/17/2023, Until Mon 4/15/2024 at 2359, Historical Med      ibuprofen (MOTRIN) 400 mg tablet Take 600 mg by mouth every 6 (six) hours as needed, Starting Tue 5/30/2023, Historical Med      losartan (COZAAR) 100 MG tablet Take 25 mg by mouth daily, Historical Med      menthol-zinc oxide (CALMOSEPTINE) 0.44-20.6 % OINT Apply topically, Starting Fri 3/17/2023, Historical Med      montelukast (SINGULAIR) 10 mg tablet Take 10 mg by mouth, Starting Fri 3/17/2023, Historical Med      omeprazole (PriLOSEC) 40 MG capsule TAKE ONE CAPSULE BY MOUTH ONCE EVERY DAY FOR GERD/REFLUX, Normal      polyethylene glycol (GLYCOLAX) 17 GM/SCOOP powder Take 17 g by mouth, Starting Fri 4/21/2023, Until Sat 4/20/2024 at 2359, Historical Med      polyethylene glycol (GOLYTELY) 4000 mL solution Take 4,000 mL by mouth once for 1 dose, Starting Tue 8/8/2023, Normal      psyllium (METAMUCIL) 0.52 g capsule Take 0.52 g by mouth 2 (two) times a day, Historical Med      !! risperiDONE (RisperDAL) 0.5 mg tablet Take 0.5 mg by mouth 2 (two) times a day, Historical Med      !! risperiDONE (RisperDAL) 1 mg tablet Take 1 mg by mouth 2 (two) times a day, Historical Med      senna (SENOKOT) 8.6 mg Take 2 tablets (17.2 mg total) by mouth daily at bedtime as needed for constipation, Starting Mon 5/29/2023, Until Mon 4/15/2024 at 2359, Normal      STARCH, THICKENING, PO Use daily for all liquids, nectar thick consistency, Historical Med      sucralfate (CARAFATE) 1 g tablet Take 1 tablet (1 g total) by mouth 4 (four) times a day, Starting Sat 6/3/2023, Normal      tamsulosin (FLOMAX) 0.4 mg Take 0.8 mg by mouth, Starting Fri 3/17/2023, Until Mon 4/15/2024 at 2359, Historical Med       !! - Potential duplicate medications found. Please discuss with provider.              PDMP Review       None            ED Provider  Electronically Signed  by             Kumar Pereira, DO  08/14/24 1530

## 2024-08-25 ENCOUNTER — APPOINTMENT (EMERGENCY)
Dept: RADIOLOGY | Facility: HOSPITAL | Age: 59
End: 2024-08-25
Payer: MEDICARE

## 2024-08-25 ENCOUNTER — HOSPITAL ENCOUNTER (EMERGENCY)
Facility: HOSPITAL | Age: 59
Discharge: HOME | End: 2024-08-26
Attending: EMERGENCY MEDICINE
Payer: MEDICARE

## 2024-08-25 DIAGNOSIS — R07.9 CHEST PAIN: Primary | ICD-10-CM

## 2024-08-25 LAB
ALBUMIN SERPL BCG-MCNC: 3.4 G/DL (ref 3.5–5)
ALP SERPL-CCNC: 53 U/L (ref 34–104)
ALT SERPL W P-5'-P-CCNC: 8 U/L (ref 7–52)
ANION GAP SERPL CALCULATED.3IONS-SCNC: 5 MMOL/L (ref 4–13)
AST SERPL W P-5'-P-CCNC: 10 U/L (ref 13–39)
ATRIAL RATE: 64 BPM
BASOPHILS # BLD AUTO: 0.01 THOUSANDS/ÂΜL (ref 0–0.1)
BASOPHILS NFR BLD AUTO: 0 % (ref 0–1)
BILIRUB SERPL-MCNC: 0.33 MG/DL (ref 0.2–1)
BILIRUB UR QL STRIP: NEGATIVE
BUN SERPL-MCNC: 17 MG/DL (ref 5–25)
CALCIUM ALBUM COR SERPL-MCNC: 9.1 MG/DL (ref 8.3–10.1)
CALCIUM SERPL-MCNC: 8.6 MG/DL (ref 8.4–10.2)
CARDIAC TROPONIN I PNL SERPL HS: 4 NG/L
CHLORIDE SERPL-SCNC: 105 MMOL/L (ref 96–108)
CLARITY UR: CLEAR
CO2 SERPL-SCNC: 30 MMOL/L (ref 21–32)
COLOR UR: COLORLESS
CREAT SERPL-MCNC: 0.93 MG/DL (ref 0.6–1.3)
EOSINOPHIL # BLD AUTO: 0.09 THOUSAND/ÂΜL (ref 0–0.61)
EOSINOPHIL NFR BLD AUTO: 2 % (ref 0–6)
ERYTHROCYTE [DISTWIDTH] IN BLOOD BY AUTOMATED COUNT: 14.6 % (ref 11.6–15.1)
GFR SERPL CREATININE-BSD FRML MDRD: 90 ML/MIN/1.73SQ M
GLUCOSE SERPL-MCNC: 112 MG/DL (ref 65–140)
GLUCOSE UR STRIP-MCNC: NEGATIVE MG/DL
HCT VFR BLD AUTO: 34.7 % (ref 36.5–49.3)
HGB BLD-MCNC: 11.7 G/DL (ref 12–17)
HGB UR QL STRIP.AUTO: NEGATIVE
IMM GRANULOCYTES # BLD AUTO: 0.01 THOUSAND/UL (ref 0–0.2)
IMM GRANULOCYTES NFR BLD AUTO: 0 % (ref 0–2)
KETONES UR STRIP-MCNC: NEGATIVE MG/DL
LEUKOCYTE ESTERASE UR QL STRIP: NEGATIVE
LIPASE SERPL-CCNC: 17 U/L (ref 11–82)
LYMPHOCYTES # BLD AUTO: 1.57 THOUSANDS/ÂΜL (ref 0.6–4.47)
LYMPHOCYTES NFR BLD AUTO: 42 % (ref 14–44)
MCH RBC QN AUTO: 31.6 PG (ref 26.8–34.3)
MCHC RBC AUTO-ENTMCNC: 33.7 G/DL (ref 31.4–37.4)
MCV RBC AUTO: 94 FL (ref 82–98)
MONOCYTES # BLD AUTO: 0.59 THOUSAND/ÂΜL (ref 0.17–1.22)
MONOCYTES NFR BLD AUTO: 16 % (ref 4–12)
NEUTROPHILS # BLD AUTO: 1.52 THOUSANDS/ÂΜL (ref 1.85–7.62)
NEUTS SEG NFR BLD AUTO: 40 % (ref 43–75)
NITRITE UR QL STRIP: NEGATIVE
NRBC BLD AUTO-RTO: 0 /100 WBCS
P AXIS: 30 DEGREES
PH UR STRIP.AUTO: 7 [PH]
PLATELET # BLD AUTO: 171 THOUSANDS/UL (ref 149–390)
PMV BLD AUTO: 9.4 FL (ref 8.9–12.7)
POTASSIUM SERPL-SCNC: 3.9 MMOL/L (ref 3.5–5.3)
PR INTERVAL: 134 MS
PROT SERPL-MCNC: 6.3 G/DL (ref 6.4–8.4)
PROT UR STRIP-MCNC: NEGATIVE MG/DL
QRS AXIS: -11 DEGREES
QRSD INTERVAL: 98 MS
QT INTERVAL: 426 MS
QTC INTERVAL: 411 MS
RBC # BLD AUTO: 3.7 MILLION/UL (ref 3.88–5.62)
SODIUM SERPL-SCNC: 140 MMOL/L (ref 135–147)
SP GR UR STRIP.AUTO: 1.01 (ref 1–1.03)
T WAVE AXIS: 10 DEGREES
UROBILINOGEN UR STRIP-ACNC: <2 MG/DL
VENTRICULAR RATE: 56 BPM
WBC # BLD AUTO: 3.79 THOUSAND/UL (ref 4.31–10.16)

## 2024-08-25 PROCEDURE — 84484 ASSAY OF TROPONIN QUANT: CPT

## 2024-08-25 PROCEDURE — 93005 ELECTROCARDIOGRAM TRACING: CPT

## 2024-08-25 PROCEDURE — 71045 X-RAY EXAM CHEST 1 VIEW: CPT

## 2024-08-25 PROCEDURE — 80053 COMPREHEN METABOLIC PANEL: CPT

## 2024-08-25 PROCEDURE — 36415 COLL VENOUS BLD VENIPUNCTURE: CPT

## 2024-08-25 PROCEDURE — 93010 ELECTROCARDIOGRAM REPORT: CPT | Performed by: INTERNAL MEDICINE

## 2024-08-25 PROCEDURE — 83690 ASSAY OF LIPASE: CPT

## 2024-08-25 PROCEDURE — 81003 URINALYSIS AUTO W/O SCOPE: CPT

## 2024-08-25 PROCEDURE — 85025 COMPLETE CBC W/AUTO DIFF WBC: CPT

## 2024-08-25 PROCEDURE — 99285 EMERGENCY DEPT VISIT HI MDM: CPT | Performed by: EMERGENCY MEDICINE

## 2024-08-25 PROCEDURE — 99285 EMERGENCY DEPT VISIT HI MDM: CPT

## 2024-08-25 RX ORDER — ACETAMINOPHEN 325 MG/1
975 TABLET ORAL ONCE
Status: COMPLETED | OUTPATIENT
Start: 2024-08-25 | End: 2024-08-25

## 2024-08-25 RX ORDER — IBUPROFEN 400 MG/1
400 TABLET, FILM COATED ORAL ONCE
Status: COMPLETED | OUTPATIENT
Start: 2024-08-25 | End: 2024-08-25

## 2024-08-25 RX ORDER — SODIUM CHLORIDE 9 MG/ML
3 INJECTION INTRAVENOUS
Status: DISCONTINUED | OUTPATIENT
Start: 2024-08-25 | End: 2024-08-26 | Stop reason: HOSPADM

## 2024-08-25 RX ADMIN — ACETAMINOPHEN 975 MG: 325 TABLET ORAL at 21:12

## 2024-08-25 RX ADMIN — IBUPROFEN 400 MG: 400 TABLET, FILM COATED ORAL at 21:12

## 2024-08-26 VITALS
TEMPERATURE: 97.8 F | SYSTOLIC BLOOD PRESSURE: 186 MMHG | HEART RATE: 55 BPM | RESPIRATION RATE: 18 BRPM | DIASTOLIC BLOOD PRESSURE: 77 MMHG | OXYGEN SATURATION: 98 %

## 2024-08-26 LAB
2HR DELTA HS TROPONIN: 0 NG/L
ATRIAL RATE: 56 BPM
CARDIAC TROPONIN I PNL SERPL HS: 4 NG/L
P AXIS: 37 DEGREES
PR INTERVAL: 142 MS
QRS AXIS: -15 DEGREES
QRSD INTERVAL: 112 MS
QT INTERVAL: 448 MS
QTC INTERVAL: 432 MS
T WAVE AXIS: 44 DEGREES
VENTRICULAR RATE: 56 BPM

## 2024-08-26 PROCEDURE — 93010 ELECTROCARDIOGRAM REPORT: CPT | Performed by: INTERNAL MEDICINE

## 2024-08-26 NOTE — ED ATTENDING ATTESTATION
8/25/2024  I, Sam Sanchez MD, saw and evaluated the patient. I have discussed the patient with the resident/non-physician practitioner and agree with the resident's/non-physician practitioner's findings, Plan of Care, and MDM as documented in the resident's/non-physician practitioner's note, except where noted. All available labs and Radiology studies were reviewed.  I was present for key portions of any procedure(s) performed by the resident/non-physician practitioner and I was immediately available to provide assistance.       At this point I agree with the current assessment done in the Emergency Department.  I have conducted an independent evaluation of this patient a history and physical is as follows:    ED Course     39-year-old male presenting with chest pain 3 days.  Worsened with movement.  Associated symptoms include shortness of breath    Vitals reviewed  Heart regular rate and rhythm without murmurs.  Lungs clear to auscultation bilaterally.  Abdomen soft nontender nondistended normal bowel sounds per extremities no edema.    Impression: Chest pain  Differential diagnosis: ACS MI arrhythmia    Plan check ECG troponin CBC CMP chest x-ray reassess anticipate discharge if negative ED workup.      Critical Care Time  Procedures

## 2024-08-26 NOTE — ED PROVIDER NOTES
Final Diagnoses:     1. Chest pain        ED Course as of 08/26/24 0033   Sun Aug 25, 2024   2101 ECG 12 lead  Compared to August 12, 2024 sinus bradycardia, normal intervals left axis deviation, abnormal EKG   2145 Hemoglobin(!): 11.7   2145 Sodium: 140   2146 Total Protein(!): 6.3   2146 LIPASE: 17   2146 Nitrite, UA: Negative   2146 Leukocytes, UA: Negative   2205 hs TnI 0hr: 4   Mon Aug 26, 2024   0007 hs TnI 2hr: 4     Nursing Triage:     Chief Complaint   Patient presents with    Chest Pain     Chest pain x3 days. Hurts more when walks, breathes. Pt endorses some SOB. Pt recently seen at [facility] for same.      HPI:   This is a 59 y.o. male presenting for evaluation of chest pain.   Relevant past medical history cognitive delay, bipolar, depression, hypertension,.  Patient is coming in complaining of chest pain.  He states that has been going on for approximately 3 days.  He was seen at [facility] about 2 weeks ago for the same thing.  He says there is subjective shortness of breath along with some abdominal pain.  He says it is generalized everywhere he cannot pinpoint where it is.  He denies any headache, dizziness, nausea, vomiting, diarrhea.  Just patient coming from group home.  ASSESSMENT + PLAN:   Patient appears to be a poor historian.  Given his comorbidities and age will obtain cardiac labs as well as a UA for his dysuria.  Also obtain EKG and chest pain.  Will also give Tylenol Motrin will likely be discharged home if workup is negative.  Likely musculoskeletal pain.  Patient better will discharge at this time as he has a negative delta troponin.  Likely MSK related.  Patient given return follow-up precautions.  Physical:   Physical Exam  Vitals and nursing note reviewed.   Constitutional:       Appearance: Normal appearance.   HENT:      Head: Atraumatic.      Mouth/Throat:      Mouth: Mucous membranes are moist.      Pharynx: No oropharyngeal exudate or posterior oropharyngeal erythema.   Eyes:       "Extraocular Movements: Extraocular movements intact.      Pupils: Pupils are equal, round, and reactive to light.   Cardiovascular:      Rate and Rhythm: Normal rate and regular rhythm.      Pulses: Normal pulses.      Heart sounds: Normal heart sounds.   Pulmonary:      Effort: Pulmonary effort is normal.      Breath sounds: Normal breath sounds.   Chest:      Chest wall: Tenderness (Sternal area reproducible to what it was.) present.   Abdominal:      General: There is no distension.      Palpations: Abdomen is soft.      Tenderness: There is no abdominal tenderness.   Musculoskeletal:         General: Normal range of motion.      Cervical back: Normal range of motion.   Skin:     General: Skin is warm and dry.      Capillary Refill: Capillary refill takes less than 2 seconds.   Neurological:      General: No focal deficit present.      Mental Status: He is alert and oriented to person, place, and time.       ED Triage Vitals   Temp Pulse Respirations Blood Pressure SpO2   -- 08/25/24 2029 08/25/24 2029 08/25/24 2330 08/25/24 2029    64 18 (!) 186/77 98 %      Temp src Heart Rate Source Patient Position - Orthostatic VS BP Location FiO2 (%)   -- 08/25/24 2029 08/25/24 2330 08/25/24 2330 --    Monitor Sitting Right arm       Pain Score       08/25/24 2112       10 - Worst Possible Pain         Vitals:    08/25/24 2029 08/25/24 2330   BP:  (!) 186/77   Pulse: 64 55   Resp: 18 18   Patient Position - Orthostatic VS:  Sitting     No results found for: \"POCGLU\"    - There are no obvious limitations to social determinants of care.   - Nursing note reviewed.   - Vitals reviewed.   - Orders placed by myself.    - Previous chart was reviewed  - No language barrier.   - History obtained from group home book and patient.    - There are no limitations to the history obtained:     Past Medical:    has a past medical history of Arthritis, Bipolar disorder (HCC), Depression, and Hypertension.    Past Surgical:    has a past " surgical history that includes IR thoracentesis (5/25/2023).    Social:     Social History     Substance and Sexual Activity   Alcohol Use Never     Social History     Tobacco Use   Smoking Status Never   Smokeless Tobacco Never     Social History     Substance and Sexual Activity   Drug Use Never       Code Status: Prior  Advance Directive and Living Will:      Power of :    POLST:    Medications   sodium chloride (PF) 0.9 % injection 3 mL (has no administration in time range)   acetaminophen (TYLENOL) tablet 975 mg (975 mg Oral Given 8/25/24 2112)   ibuprofen (MOTRIN) tablet 400 mg (400 mg Oral Given 8/25/24 2112)     X-ray chest 1 view portable   ED Interpretation   Compared to prior on August 12, 2024, trace pleural effusion on the right, no other cardiopulmonary disease noted.        Orders Placed This Encounter   Procedures    X-ray chest 1 view portable    CBC and differential    HS Troponin 0hr (reflex protocol)    Comprehensive metabolic panel    Lipase    UA w Reflex to Microscopic w Reflex to Culture    HS Troponin I 2hr    HS Troponin I 4hr    Continuous cardiac monitoring    Continuous pulse oximetry    Insert peripheral IV    ECG 12 lead    ECG 12 lead    ECG 12 lead repeat Q30 minutes PRN persistent chest pain x 2    ECG 12 lead repeat in 2hrs    ECG 12 lead repeat in 4hrs    ECG 12 lead     Labs Reviewed   CBC AND DIFFERENTIAL - Abnormal       Result Value Ref Range Status    WBC 3.79 (*) 4.31 - 10.16 Thousand/uL Final    RBC 3.70 (*) 3.88 - 5.62 Million/uL Final    Hemoglobin 11.7 (*) 12.0 - 17.0 g/dL Final    Hematocrit 34.7 (*) 36.5 - 49.3 % Final    MCV 94  82 - 98 fL Final    MCH 31.6  26.8 - 34.3 pg Final    MCHC 33.7  31.4 - 37.4 g/dL Final    RDW 14.6  11.6 - 15.1 % Final    MPV 9.4  8.9 - 12.7 fL Final    Platelets 171  149 - 390 Thousands/uL Final    nRBC 0  /100 WBCs Final    Segmented % 40 (*) 43 - 75 % Final    Immature Grans % 0  0 - 2 % Final    Lymphocytes % 42  14 - 44 %  Final    Monocytes % 16 (*) 4 - 12 % Final    Eosinophils Relative 2  0 - 6 % Final    Basophils Relative 0  0 - 1 % Final    Absolute Neutrophils 1.52 (*) 1.85 - 7.62 Thousands/µL Final    Absolute Immature Grans 0.01  0.00 - 0.20 Thousand/uL Final    Absolute Lymphocytes 1.57  0.60 - 4.47 Thousands/µL Final    Absolute Monocytes 0.59  0.17 - 1.22 Thousand/µL Final    Eosinophils Absolute 0.09  0.00 - 0.61 Thousand/µL Final    Basophils Absolute 0.01  0.00 - 0.10 Thousands/µL Final   COMPREHENSIVE METABOLIC PANEL - Abnormal    Sodium 140  135 - 147 mmol/L Final    Potassium 3.9  3.5 - 5.3 mmol/L Final    Chloride 105  96 - 108 mmol/L Final    CO2 30  21 - 32 mmol/L Final    ANION GAP 5  4 - 13 mmol/L Final    BUN 17  5 - 25 mg/dL Final    Creatinine 0.93  0.60 - 1.30 mg/dL Final    Comment: Standardized to IDMS reference method    Glucose 112  65 - 140 mg/dL Final    Comment: If the patient is fasting, the ADA then defines impaired fasting glucose as > 100 mg/dL and diabetes as > or equal to 123 mg/dL.    Calcium 8.6  8.4 - 10.2 mg/dL Final    Corrected Calcium 9.1  8.3 - 10.1 mg/dL Final    AST 10 (*) 13 - 39 U/L Final    ALT 8  7 - 52 U/L Final    Comment: Specimen collection should occur prior to Sulfasalazine administration due to the potential for falsely depressed results.     Alkaline Phosphatase 53  34 - 104 U/L Final    Total Protein 6.3 (*) 6.4 - 8.4 g/dL Final    Albumin 3.4 (*) 3.5 - 5.0 g/dL Final    Total Bilirubin 0.33  0.20 - 1.00 mg/dL Final    Comment: Use of this assay is not recommended for patients undergoing treatment with eltrombopag due to the potential for falsely elevated results.  N-acetyl-p-benzoquinone imine (metabolite of Acetaminophen) will generate erroneously low results in samples for patients that have taken an overdose of Acetaminophen.    eGFR 90  ml/min/1.73sq m Final    Narrative:     National Kidney Disease Foundation guidelines for Chronic Kidney Disease (CKD):     Stage 1  "with normal or high GFR (GFR > 90 mL/min/1.73 square meters)    Stage 2 Mild CKD (GFR = 60-89 mL/min/1.73 square meters)    Stage 3A Moderate CKD (GFR = 45-59 mL/min/1.73 square meters)    Stage 3B Moderate CKD (GFR = 30-44 mL/min/1.73 square meters)    Stage 4 Severe CKD (GFR = 15-29 mL/min/1.73 square meters)    Stage 5 End Stage CKD (GFR <15 mL/min/1.73 square meters)  Note: GFR calculation is accurate only with a steady state creatinine   HS TROPONIN I 0HR - Normal    hs TnI 0hr 4  \"Refer to ACS Flowchart\"- see link ng/L Final    Comment:                                              Initial (time 0) result  If >=50 ng/L, Myocardial injury suggested ;  Type of myocardial injury and treatment strategy  to be determined.  If 5-49 ng/L, a delta result at 2 hours and or 4 hours will be needed to further evaluate.  If <4 ng/L, and chest pain has been >3 hours since onset, patient may qualify for discharge based on the HEART score in the ED.  If <5 ng/L and <3hours since onset of chest pain, a delta result at 2 hours will be needed to further evaluate.    HS Troponin 99th Percentile URL of a Health Population=12 ng/L with a 95% Confidence Interval of 8-18 ng/L.    Second Troponin (time 2 hours)  If calculated delta >= 20 ng/L,  Myocardial injury suggested ; Type of myocardial injury and treatment strategy to be determined.  If 5-49 ng/L and the calculated delta is 5-19 ng/L, consult medical service for evaluation.  Continue evaluation for ischemia on ecg and other possible etiology and repeat hs troponin at 4 hours.  If delta is <5 ng/L at 2 hours, consider discharge based on risk stratification via the HEART score (if in ED), or PETE risk score in IP/Observation.    HS Troponin 99th Percentile URL of a Health Population=12 ng/L with a 95% Confidence Interval of 8-18 ng/L.   LIPASE - Normal    Lipase 17  11 - 82 u/L Final   HS TROPONIN I 2HR - Normal    hs TnI 2hr 4  \"Refer to ACS Flowchart\"- see link ng/L Final    " Comment:                                              Initial (time 0) result  If >=50 ng/L, Myocardial injury suggested ;  Type of myocardial injury and treatment strategy  to be determined.  If 5-49 ng/L, a delta result at 2 hours and or 4 hours will be needed to further evaluate.  If <4 ng/L, and chest pain has been >3 hours since onset, patient may qualify for discharge based on the HEART score in the ED.  If <5 ng/L and <3hours since onset of chest pain, a delta result at 2 hours will be needed to further evaluate.    HS Troponin 99th Percentile URL of a Health Population=12 ng/L with a 95% Confidence Interval of 8-18 ng/L.    Second Troponin (time 2 hours)  If calculated delta >= 20 ng/L,  Myocardial injury suggested ; Type of myocardial injury and treatment strategy to be determined.  If 5-49 ng/L and the calculated delta is 5-19 ng/L, consult medical service for evaluation.  Continue evaluation for ischemia on ecg and other possible etiology and repeat hs troponin at 4 hours.  If delta is <5 ng/L at 2 hours, consider discharge based on risk stratification via the HEART score (if in ED), or PETE risk score in IP/Observation.    HS Troponin 99th Percentile URL of a Health Population=12 ng/L with a 95% Confidence Interval of 8-18 ng/L.    Delta 2hr hsTnI 0  <20 ng/L Final   UA W REFLEX TO MICROSCOPIC WITH REFLEX TO CULTURE    Color, UA Colorless   Final    Clarity, UA Clear   Final    Specific Gravity, UA 1.006  1.003 - 1.030 Final    pH, UA 7.0  4.5, 5.0, 5.5, 6.0, 6.5, 7.0, 7.5, 8.0 Final    Leukocytes, UA Negative  Negative Final    Nitrite, UA Negative  Negative Final    Protein, UA Negative  Negative mg/dl Final    Glucose, UA Negative  Negative mg/dl Final    Ketones, UA Negative  Negative mg/dl Final    Urobilinogen, UA <2.0  <2.0 mg/dl mg/dl Final    Bilirubin, UA Negative  Negative Final    Occult Blood, UA Negative  Negative Final   HS TROPONIN I 4HR       Time reflects when diagnosis was documented  in both MDM as applicable and the Disposition within this note       Time User Action Codes Description Comment    8/26/2024 12:09 AM Carlton Cary [R07.9] Chest pain           ED Disposition       ED Disposition   Discharge    Condition   Stable    Date/Time   Mon Aug 26, 2024 12:09 AM    Comment   Orlin John discharge to home/self care.                   Follow-up Information       Follow up With Specialties Details Why Contact Info    Gadiel Burrows, DO Pediatrics In 1 week If symptoms worsen [address] [place] [address]  [phone]            Patient's Medications   Discharge Prescriptions    No medications on file     No discharge procedures on file.  Prior to Admission Medications   Prescriptions Last Dose Informant Patient Reported? Taking?   Colchicine 0.6 MG CAPS  Outside Facility (Specify) Yes No   Sig: Take 0.6 mg by mouth daily   EPINEPHrine (EPIPEN) 0.3 mg/0.3 mL SOAJ  Outside Facility (Specify) Yes No   Sig: Inject 0.3 mg into a muscle   STARCH, THICKENING, PO   Yes No   Sig: Use daily for all liquids, nectar thick consistency   acetaminophen (TYLENOL) 500 mg tablet   Yes No   Sig: Take 500 mg by mouth every 6 (six) hours as needed for mild pain   allopurinol (ZYLOPRIM) 100 mg tablet  Outside Facility (Specify) Yes No   Sig: Take 100 mg by mouth daily   amLODIPine (NORVASC) 5 mg tablet   Yes No   Sig: Take 5 mg by mouth daily   benzonatate (TESSALON) 200 MG capsule   Yes No   Sig: Take 200 mg by mouth 3 (three) times a day as needed for cough   bisacodyl (DULCOLAX) 5 mg EC tablet  Outside Facility (Specify) No No   Sig: Take as directed as per written office instructions   Patient not taking: Reported on 8/30/2023   chlorhexidine (Hibiclens) 4 % external liquid   Yes No   Sig: Use to wash body daily for 5 days prior to scheduled surgery.  Use new wash cloth each time.   dicyclomine (BENTYL) 10 mg capsule  Outside Facility (Specify) No No   Sig: Take 1 capsule (10 mg total) by  mouth 4 (four) times a day as needed (abdominal pain)   diphenhydrAMINE (BENADRYL) 50 mg capsule  Outside Facility (Specify) Yes No   Sig: Take 50 mg by mouth every 6 (six) hours as needed   Patient not taking: Reported on 4/15/2024   diphenoxylate-atropine (LOMOTIL) 2.5-0.025 mg per tablet  Outside Facility (Specify) Yes No   Sig: Take 2 tablets by mouth 2 (two) times a day as needed   Patient not taking: Reported on 4/15/2024   divalproex sodium (DEPAKOTE) 500 mg DR tablet  Outside Facility (Specify) Yes No   Sig: Take 500 mg by mouth 2 (two) times a day   escitalopram (LEXAPRO) 20 mg tablet  Outside Facility (Specify) Yes No   Sig: Take 20 mg by mouth daily   famotidine (PEPCID) 40 MG tablet   No No   Sig: TAKE ONE TABLET BY MOUTH ONCE EVERY DAY FOR GERD/REFLUX   furosemide (LASIX) 20 mg tablet  Outside Facility (Specify) Yes No   Sig: Take 1 tablet by mouth daily as needed   ibuprofen (MOTRIN) 400 mg tablet  Outside Facility (Specify) Yes No   Sig: Take 600 mg by mouth every 6 (six) hours as needed   losartan (COZAAR) 100 MG tablet  Outside Facility (Specify) Yes No   Sig: Take 25 mg by mouth daily   Patient not taking: Reported on 8/30/2023   losartan (COZAAR) 100 MG tablet  Outside Facility (Specify) Yes No   Sig: Take 100 mg by mouth daily   menthol-zinc oxide (CALMOSEPTINE) 0.44-20.6 % OINT  Outside Facility (Specify) Yes No   Sig: Apply topically   montelukast (SINGULAIR) 10 mg tablet  Outside Facility (Specify) Yes No   Sig: Take 10 mg by mouth   omeprazole (PriLOSEC) 40 MG capsule   No No   Sig: TAKE ONE CAPSULE BY MOUTH ONCE EVERY DAY FOR GERD/REFLUX   polyethylene glycol (GLYCOLAX) 17 GM/SCOOP powder  Outside Facility (Specify) Yes No   Sig: Take 17 g by mouth   polyethylene glycol (GOLYTELY) 4000 mL solution   No No   Sig: Take 4,000 mL by mouth once for 1 dose   psyllium (METAMUCIL) 0.52 g capsule   Yes No   Sig: Take 0.52 g by mouth 2 (two) times a day   risperiDONE (RisperDAL) 0.5 mg tablet  Outside  "Facility (Specify) Yes No   Sig: Take 0.5 mg by mouth 2 (two) times a day   risperiDONE (RisperDAL) 1 mg tablet  Outside Facility (Specify) Yes No   Sig: Take 1 mg by mouth 2 (two) times a day   senna (SENOKOT) 8.6 mg   No No   Sig: Take 2 tablets (17.2 mg total) by mouth daily at bedtime as needed for constipation   sucralfate (CARAFATE) 1 g tablet  Outside Facility (Specify) No No   Sig: Take 1 tablet (1 g total) by mouth 4 (four) times a day   tamsulosin (FLOMAX) 0.4 mg  Outside Facility (Specify) Yes No   Sig: Take 0.8 mg by mouth      Facility-Administered Medications: None     HEART Risk Score      Flowsheet Row Most Recent Value   Heart Score Risk Calculator    History 1 Filed at: 08/26/2024 0009   ECG 0 Filed at: 08/26/2024 0009   Age 1 Filed at: 08/26/2024 0009   Risk Factors 2 Filed at: 08/26/2024 0009   Troponin 0 Filed at: 08/26/2024 0009   HEART Score 4 Filed at: 08/26/2024 0009                           Portions of the record may have been created with voice recognition software. Occasional wrong word or \"sound a like\" substitutions may have occurred due to the inherent limitations of voice recognition software. Read the chart carefully and recognize, using context, where substitutions have occurred.     Carlton Cary MD  08/26/24 0034    "

## 2024-11-07 ENCOUNTER — NURSE TRIAGE (OUTPATIENT)
Age: 59
End: 2024-11-07

## 2024-11-07 DIAGNOSIS — K21.9 GASTROESOPHAGEAL REFLUX DISEASE WITHOUT ESOPHAGITIS: ICD-10-CM

## 2024-11-07 RX ORDER — FAMOTIDINE 40 MG/1
40 TABLET, FILM COATED ORAL DAILY
Qty: 30 TABLET | Refills: 5 | Status: SHIPPED | OUTPATIENT
Start: 2024-11-07

## 2024-11-07 NOTE — TELEPHONE ENCOUNTER
"SHARI FROM [facility], CALLING TO REQUEST REFILL FOR FAMOTIDINE 40 MG DAILY TO [facility] ON FILE. ANY QUESTIONS, SHARI CAN BE REACHED [phone].          Answer Assessment - Initial Assessment Questions  1. REASON FOR CALL: \"What is the main reason for your call?\" or \"How can I best help you?\"        SHARI FROM [facility], CALLING TO REQUEST REFILL FOR FAMOTIDINE 40 MG DAILY TO [facility] ON FILE. ANY QUESTIONS, SHARI CAN BE REACHED [phone].    Protocols used: Information Only Call - No Triage-Adult-[place]    "

## 2024-11-07 NOTE — TELEPHONE ENCOUNTER
Called to schedule an office visit     Spoke with Kelin who will have someone reach out to us to schedule an appt.

## 2025-02-07 ENCOUNTER — CONSULT (OUTPATIENT)
Dept: CARDIOLOGY CLINIC | Facility: CLINIC | Age: 60
End: 2025-02-07
Payer: MEDICARE

## 2025-02-07 VITALS — OXYGEN SATURATION: 98 % | HEART RATE: 62 BPM | DIASTOLIC BLOOD PRESSURE: 62 MMHG | SYSTOLIC BLOOD PRESSURE: 114 MMHG

## 2025-02-07 DIAGNOSIS — R07.9 CHEST PAIN: ICD-10-CM

## 2025-02-07 DIAGNOSIS — I10 ESSENTIAL HYPERTENSION: Primary | ICD-10-CM

## 2025-02-07 PROCEDURE — 99204 OFFICE O/P NEW MOD 45 MIN: CPT | Performed by: INTERNAL MEDICINE

## 2025-02-07 NOTE — PROGRESS NOTES
Cardiology Clinic Visit Note  Orlin John 59 y.o. male   MRN: [id number]    Assessment and Plan      Diagnoses and all orders for this visit:    Chest pain  - Orlin was referred for evaluation of chest pain.  2 ED visits in August for same during which workup was largely unremarkable including EKG, serum troponin, chest x-ray and routine labs.  - Seen by cardiology during one of his inpatient admissions 1/2023 for incidental cardiomegaly.  - Echocardiogram completed at that time showed preserved LVEF, normal RV and no significant valvular abnormalities.  - Nuclear stress test 8/2023 no evidence of stress-induced myocardial ischemia and normal LV perfusion.  - Reviewed CT chest showing no significant coronary artery calcification.  - LDL 64, total cholesterol 115, A1c 5.  - EKG completed today while complaining of chest pain showed sinus bradycardia heart rate of 54 bpm, nonspecific ST/T wave changes similar to prior EKG.  - The etiology of patient's chest pain is unclear, however, does not appear to be cardiac in origin.  - No further workup is recommended at this time.  - Continue current antihypertensive regimen, med list to be updated once records are received.  - Follow-up as needed.  -     Ambulatory Referral to Cardiology    Essential hypertension  - Blood pressure is well-controlled.  We do not have the most recent medication list.  I asked to be faxed so we can update his record.      Schedule a follow-up appointment as needed.     Chief Complaint: Chest pain       History of Present Illness:    It's my pleasure meeting Orlin John who is a 59 y.o. patient, referred by Kumar Pereira DO for evaluation of chest pain.     The patient has past medical history of hypertension, cognitive impairment, bipolar disorder, presumed neuroendocrine tumor of the pancreas on surveillance, splenic artery aneurysms on surveillance, LUPIS, BPH,  gout, aspiration pneumonitis, esophagitis and morbid obesity.   Patient lives in our essential home.  He is accompanied by his DSP, Loida.   The patient was seen in the emergency department twice in August 2024 for chest pain.  Workup at that time showed nonischemic EKG, negative troponins, normal CBC/CMP and negative chest x-ray.  He was discharged from emergency department and was referred to cardiology for evaluation.  Following this, he had an admission at [facility] in December 2024 for strokelike symptoms and pneumonia.  Neuroimaging showed no evidence of acute stroke.  He was found to have evidence of silent aspiration with thin liquids and his diet was modified.  Orlin is a poor historian and unable to provide accurate history.  When asked about his chest pain, he reported that he is currently experiencing central chest pressure, initially said 1 out of 10 then > 10/10.  He said that his pain is worse with pressing on the chest.  Not aggravated/relieved with certain positions.  No leg swelling or shortness of breath.  Orlin is not very active, he spends most of the day sitting.  He does go to day programs.  No history of smoking.  Unknown family history.      Previous Cardiac Workup:    EKG  Sinus bradycardia, ventricular rate 54 bpm.  Nonspecific ST and T wave changes.  Similar to EKG 9/25/2024.    TREADMILL STRESS  No results found for this or any previous visit.     ----------------------------------------------------------------------------------------------  NUCLEAR STRESS TEST: No results found for this or any previous visit.    Results for orders placed during the hospital encounter of 08/23/23    NM myocardial perfusion spect (rx stress and/or rest)    Interpretation Summary    Stress ECG: No ST deviation is noted. The ECG was not diagnostic due to pharmacological (vasodilator) stress.    Perfusion: There are no perfusion defects.    Stress Function: Left ventricular function post-stress is normal. Stress ejection fraction is 61 %.    Stress  Combined Conclusion: The ECG and SPECT imaging portions of the stress study are concordant with no evidence of stress induced myocardial ischemia. Left ventricular perfusion is normal.    Resting ECG: The ECG shows normal sinus rhythm.      --------------------------------------------------------------------------------  CATH:  No results found for this or any previous visit.    --------------------------------------------------------------------------------  ECHO: 1/22/20230-[facility]    Left Ventricle: Systolic function is normal with an ejection fraction   of 60-65%. There is grade I (mild) diastolic dysfunction and normal left   atrial pressure.     Right Ventricle: Systolic function is normal.     No significant valvular abnormalities.   r any previous visit.    --------------------------------------------------------------------------------  HOLTER  No results found for this or any previous visit.    --------------------------------------------------------------------------------  CAROTIDS  No results found for this or any previous visit.       ---------------------------------------------------------------------------------  Review of Systems   Respiratory:  Positive for shortness of breath.    Cardiovascular:  Positive for chest pain. Negative for leg swelling.         Current Outpatient Medications:     acetaminophen (TYLENOL) 500 mg tablet, Take 500 mg by mouth every 6 (six) hours as needed for mild pain, Disp: , Rfl:     allopurinol (ZYLOPRIM) 100 mg tablet, Take 100 mg by mouth daily, Disp: , Rfl:     amLODIPine (NORVASC) 5 mg tablet, Take 5 mg by mouth daily, Disp: , Rfl:     benzonatate (TESSALON) 200 MG capsule, Take 200 mg by mouth 3 (three) times a day as needed for cough, Disp: , Rfl:     bisacodyl (DULCOLAX) 5 mg EC tablet, Take as directed as per written office instructions (Patient not taking: Reported on 8/30/2023), Disp: 2 tablet, Rfl: 0    chlorhexidine (Hibiclens) 4 % external liquid, Use to wash  body daily for 5 days prior to scheduled surgery.  Use new wash cloth each time., Disp: , Rfl:     Colchicine 0.6 MG CAPS, Take 0.6 mg by mouth daily, Disp: , Rfl:     dicyclomine (BENTYL) 10 mg capsule, Take 1 capsule (10 mg total) by mouth 4 (four) times a day as needed (abdominal pain), Disp: 60 capsule, Rfl: 2    diphenhydrAMINE (BENADRYL) 50 mg capsule, Take 50 mg by mouth every 6 (six) hours as needed (Patient not taking: Reported on 4/15/2024), Disp: , Rfl:     diphenoxylate-atropine (LOMOTIL) 2.5-0.025 mg per tablet, Take 2 tablets by mouth 2 (two) times a day as needed (Patient not taking: Reported on 4/15/2024), Disp: , Rfl:     divalproex sodium (DEPAKOTE) 500 mg DR tablet, Take 500 mg by mouth 2 (two) times a day, Disp: , Rfl:     EPINEPHrine (EPIPEN) 0.3 mg/0.3 mL SOAJ, Inject 0.3 mg into a muscle, Disp: , Rfl:     escitalopram (LEXAPRO) 20 mg tablet, Take 20 mg by mouth daily, Disp: , Rfl:     famotidine (PEPCID) 40 MG tablet, Take 1 tablet (40 mg total) by mouth daily, Disp: 30 tablet, Rfl: 5    furosemide (LASIX) 20 mg tablet, Take 1 tablet by mouth daily as needed, Disp: , Rfl:     ibuprofen (MOTRIN) 400 mg tablet, Take 600 mg by mouth every 6 (six) hours as needed, Disp: , Rfl:     losartan (COZAAR) 100 MG tablet, Take 25 mg by mouth daily (Patient not taking: Reported on 8/30/2023), Disp: , Rfl:     losartan (COZAAR) 100 MG tablet, Take 100 mg by mouth daily, Disp: , Rfl:     menthol-zinc oxide (CALMOSEPTINE) 0.44-20.6 % OINT, Apply topically, Disp: , Rfl:     montelukast (SINGULAIR) 10 mg tablet, Take 10 mg by mouth, Disp: , Rfl:     omeprazole (PriLOSEC) 40 MG capsule, TAKE ONE CAPSULE BY MOUTH ONCE EVERY DAY FOR GERD/REFLUX, Disp: 90 capsule, Rfl: 1    polyethylene glycol (GLYCOLAX) 17 GM/SCOOP powder, Take 17 g by mouth, Disp: , Rfl:     polyethylene glycol (GOLYTELY) 4000 mL solution, Take 4,000 mL by mouth once for 1 dose, Disp: 4000 mL, Rfl: 0    psyllium (METAMUCIL) 0.52 g capsule, Take  0.52 g by mouth 2 (two) times a day, Disp: , Rfl:     risperiDONE (RisperDAL) 0.5 mg tablet, Take 0.5 mg by mouth 2 (two) times a day, Disp: , Rfl:     risperiDONE (RisperDAL) 1 mg tablet, Take 1 mg by mouth 2 (two) times a day, Disp: , Rfl:     senna (SENOKOT) 8.6 mg, Take 2 tablets (17.2 mg total) by mouth daily at bedtime as needed for constipation, Disp: 60 tablet, Rfl: 0    STARCH, THICKENING, PO, Use daily for all liquids, nectar thick consistency, Disp: , Rfl:     sucralfate (CARAFATE) 1 g tablet, Take 1 tablet (1 g total) by mouth 4 (four) times a day, Disp: 60 tablet, Rfl: 0    tamsulosin (FLOMAX) 0.4 mg, Take 0.8 mg by mouth, Disp: , Rfl:   Past Medical History:   Diagnosis Date    Arthritis     Bipolar disorder (HCC)     Depression     Hypertension      Past Surgical History:   Procedure Laterality Date    IR THORACENTESIS  5/25/2023     Social History     Socioeconomic History    Marital status: Single     Spouse name: Not on file    Number of children: Not on file    Years of education: Not on file    Highest education level: Not on file   Occupational History    Not on file   Tobacco Use    Smoking status: Never    Smokeless tobacco: Never   Vaping Use    Vaping status: Never Used   Substance and Sexual Activity    Alcohol use: Never    Drug use: Never    Sexual activity: Not on file   Other Topics Concern    Not on file   Social History Narrative    Not on file     Social Drivers of Health     Financial Resource Strain: Patient Unable To Answer (12/8/2024)    Received from [facility]    Overall Financial Resource Strain (CARDIA)     Difficulty of Paying Living Expenses: Patient unable to answer   Food Insecurity: Patient Unable To Answer (12/8/2024)    Received from [facility]    Hunger Vital Sign     Worried About Running Out of Food in the Last Year: Patient unable to answer     Ran Out of Food in the Last Year: Patient unable to answer   Transportation Needs: No  Transportation Needs (1/21/2025)    Received from [facility]    OASIS : Transportation     Lack of Transportation (Medical): No     Lack of Transportation (Non-Medical): No     Patient Unable or Declines to Respond: No   Physical Activity: Not on file   Stress: Not on file   Social Connections: Not on file   Intimate Partner Violence: Patient Unable To Answer (12/8/2024)    [facility]    Humiliation, Afraid, Rape, and Kick questionnaire     Fear of Current or Ex-Partner: Patient unable to answer     Emotionally Abused: Patient unable to answer     Physically Abused: Patient unable to answer     Sexually Abused: Patient unable to answer   Housing Stability: Patient Unable To Answer (12/8/2024)    Received from [facility]    Housing Stability Vital Sign     Unable to Pay for Housing in the Last Year: Patient unable to answer     Number of Times Moved in the Last Year: 0     Homeless in the Last Year: Patient unable to answer     No family history on file.  Allergies   Allergen Reactions    Bee Venom Anaphylaxis, Hives and Swelling       Objective     Vitals:    02/07/25 1302   BP: 114/62   BP Location: Left arm   Patient Position: Sitting   Cuff Size: Large   Pulse: 62   SpO2: 98%         Physical Exam  Vitals and nursing note reviewed.   Cardiovascular:      Rate and Rhythm: Normal rate.      Heart sounds: Normal heart sounds. No murmur heard.  Pulmonary:      Breath sounds: Normal breath sounds. No wheezing.   Abdominal:      Palpations: Abdomen is soft.   Musculoskeletal:      Right lower leg: No edema.      Left lower leg: No edema.   Skin:     General: Skin is warm.   Neurological:      Mental Status: He is alert.         Julio Mathis MD  Cardiology fellow-FY1  ==  PLEASE NOTE:  This encounter was completed utilizing the [employer] Direct Speech Voice Recognition Software. Grammatical errors, random word  insertions, pronoun errors and incomplete sentences are occasional consequences of the system due to software limitations, ambient noise and hardware issues.These may be missed by proof reading prior to affixing electronic signature. Any questions or concerns about the content, text or information contained within the body of this dictation should be directly addressed to the physician for clarification. Please do not hesitate to call me directly if you have any any questions or concerns.

## 2025-02-07 NOTE — PROGRESS NOTES
Cardiology Clinic Visit Note  Orlin John 59 y.o. male   MRN: [id number]    Assessment and Plan      {Assess/PlanSmartLinks:65123}            Schedule a follow-up appointment in ***.     Chief Complaint: ***       History of Present Illness:    It's my pleasure meeting Orlin John who is a 59 y.o. patient, referred by *** for ***.       Previous Cardiac Workup:    EKG      TREADMILL STRESS  No results found for this or any previous visit.     ----------------------------------------------------------------------------------------------  NUCLEAR STRESS TEST: No results found for this or any previous visit.    Results for orders placed during the hospital encounter of 08/23/23    NM myocardial perfusion spect (rx stress and/or rest)    Interpretation Summary  •  Stress ECG: No ST deviation is noted. The ECG was not diagnostic due to pharmacological (vasodilator) stress.  •  Perfusion: There are no perfusion defects.  •  Stress Function: Left ventricular function post-stress is normal. Stress ejection fraction is 61 %.  •  Stress Combined Conclusion: The ECG and SPECT imaging portions of the stress study are concordant with no evidence of stress induced myocardial ischemia. Left ventricular perfusion is normal.  •  Resting ECG: The ECG shows normal sinus rhythm.      --------------------------------------------------------------------------------  CATH:  No results found for this or any previous visit.    --------------------------------------------------------------------------------  ECHO:   No results found for this or any previous visit.    No results found for this or any previous visit.    --------------------------------------------------------------------------------  HOLTER  No results found for this or any previous visit.    --------------------------------------------------------------------------------  CAROTIDS  No results found for this or any previous visit.        ---------------------------------------------------------------------------------  Review of Systems      Current Outpatient Medications:   •  acetaminophen (TYLENOL) 500 mg tablet, Take 500 mg by mouth every 6 (six) hours as needed for mild pain, Disp: , Rfl:   •  allopurinol (ZYLOPRIM) 100 mg tablet, Take 100 mg by mouth daily, Disp: , Rfl:   •  amLODIPine (NORVASC) 5 mg tablet, Take 5 mg by mouth daily, Disp: , Rfl:   •  benzonatate (TESSALON) 200 MG capsule, Take 200 mg by mouth 3 (three) times a day as needed for cough, Disp: , Rfl:   •  bisacodyl (DULCOLAX) 5 mg EC tablet, Take as directed as per written office instructions (Patient not taking: Reported on 8/30/2023), Disp: 2 tablet, Rfl: 0  •  chlorhexidine (Hibiclens) 4 % external liquid, Use to wash body daily for 5 days prior to scheduled surgery.  Use new wash cloth each time., Disp: , Rfl:   •  Colchicine 0.6 MG CAPS, Take 0.6 mg by mouth daily, Disp: , Rfl:   •  dicyclomine (BENTYL) 10 mg capsule, Take 1 capsule (10 mg total) by mouth 4 (four) times a day as needed (abdominal pain), Disp: 60 capsule, Rfl: 2  •  diphenhydrAMINE (BENADRYL) 50 mg capsule, Take 50 mg by mouth every 6 (six) hours as needed (Patient not taking: Reported on 4/15/2024), Disp: , Rfl:   •  diphenoxylate-atropine (LOMOTIL) 2.5-0.025 mg per tablet, Take 2 tablets by mouth 2 (two) times a day as needed (Patient not taking: Reported on 4/15/2024), Disp: , Rfl:   •  divalproex sodium (DEPAKOTE) 500 mg DR tablet, Take 500 mg by mouth 2 (two) times a day, Disp: , Rfl:   •  EPINEPHrine (EPIPEN) 0.3 mg/0.3 mL SOAJ, Inject 0.3 mg into a muscle, Disp: , Rfl:   •  escitalopram (LEXAPRO) 20 mg tablet, Take 20 mg by mouth daily, Disp: , Rfl:   •  famotidine (PEPCID) 40 MG tablet, Take 1 tablet (40 mg total) by mouth daily, Disp: 30 tablet, Rfl: 5  •  furosemide (LASIX) 20 mg tablet, Take 1 tablet by mouth daily as needed, Disp: , Rfl:   •  ibuprofen (MOTRIN) 400 mg tablet, Take 600 mg by  mouth every 6 (six) hours as needed, Disp: , Rfl:   •  losartan (COZAAR) 100 MG tablet, Take 25 mg by mouth daily (Patient not taking: Reported on 8/30/2023), Disp: , Rfl:   •  losartan (COZAAR) 100 MG tablet, Take 100 mg by mouth daily, Disp: , Rfl:   •  menthol-zinc oxide (CALMOSEPTINE) 0.44-20.6 % OINT, Apply topically, Disp: , Rfl:   •  montelukast (SINGULAIR) 10 mg tablet, Take 10 mg by mouth, Disp: , Rfl:   •  omeprazole (PriLOSEC) 40 MG capsule, TAKE ONE CAPSULE BY MOUTH ONCE EVERY DAY FOR GERD/REFLUX, Disp: 90 capsule, Rfl: 1  •  polyethylene glycol (GLYCOLAX) 17 GM/SCOOP powder, Take 17 g by mouth, Disp: , Rfl:   •  polyethylene glycol (GOLYTELY) 4000 mL solution, Take 4,000 mL by mouth once for 1 dose, Disp: 4000 mL, Rfl: 0  •  psyllium (METAMUCIL) 0.52 g capsule, Take 0.52 g by mouth 2 (two) times a day, Disp: , Rfl:   •  risperiDONE (RisperDAL) 0.5 mg tablet, Take 0.5 mg by mouth 2 (two) times a day, Disp: , Rfl:   •  risperiDONE (RisperDAL) 1 mg tablet, Take 1 mg by mouth 2 (two) times a day, Disp: , Rfl:   •  senna (SENOKOT) 8.6 mg, Take 2 tablets (17.2 mg total) by mouth daily at bedtime as needed for constipation, Disp: 60 tablet, Rfl: 0  •  STARCH, THICKENING, PO, Use daily for all liquids, nectar thick consistency, Disp: , Rfl:   •  sucralfate (CARAFATE) 1 g tablet, Take 1 tablet (1 g total) by mouth 4 (four) times a day, Disp: 60 tablet, Rfl: 0  •  tamsulosin (FLOMAX) 0.4 mg, Take 0.8 mg by mouth, Disp: , Rfl:   Past Medical History:   Diagnosis Date   • Arthritis    • Bipolar disorder (HCC)    • Depression    • Hypertension      Past Surgical History:   Procedure Laterality Date   • IR THORACENTESIS  5/25/2023     Social History     Socioeconomic History   • Marital status: Single     Spouse name: Not on file   • Number of children: Not on file   • Years of education: Not on file   • Highest education level: Not on file   Occupational History   • Not on file   Tobacco Use   • Smoking status:  Never   • Smokeless tobacco: Never   Vaping Use   • Vaping status: Never Used   Substance and Sexual Activity   • Alcohol use: Never   • Drug use: Never   • Sexual activity: Not on file   Other Topics Concern   • Not on file   Social History Narrative   • Not on file     Social Drivers of Health     Financial Resource Strain: Patient Unable To Answer (12/8/2024)    Received from [facility]    Overall Financial Resource Strain (CARDIA)    • Difficulty of Paying Living Expenses: Patient unable to answer   Food Insecurity: Patient Unable To Answer (12/8/2024)    Received from [facility]    Hunger Vital Sign    • Worried About Running Out of Food in the Last Year: Patient unable to answer    • Ran Out of Food in the Last Year: Patient unable to answer   Transportation Needs: No Transportation Needs (1/21/2025)    Received from [facility]    OASIS : Transportation    • Lack of Transportation (Medical): No    • Lack of Transportation (Non-Medical): No    • Patient Unable or Declines to Respond: No   Physical Activity: Not on file   Stress: Not on file   Social Connections: Not on file   Intimate Partner Violence: Patient Unable To Answer (12/8/2024)    Received from [facility], [facility]    Humiliation, Afraid, Rape, and Kick questionnaire    • Fear of Current or Ex-Partner: Patient unable to answer    • Emotionally Abused: Patient unable to answer    • Physically Abused: Patient unable to answer    • Sexually Abused: Patient unable to answer   Housing Stability: Patient Unable To Answer (12/8/2024)    Received from [facility]    Housing Stability Vital Sign    • Unable to Pay for Housing in the Last Year: Patient unable to answer    • Number of Times Moved in the Last Year: 0    • Homeless in the Last Year: Patient unable to answer     No family history on file.  Allergies   Allergen Reactions   • Bee Venom  Anaphylaxis, Hives and Swelling       Objective     Vitals:    02/07/25 1302   BP: 114/62   BP Location: Left arm   Patient Position: Sitting   Cuff Size: Large   Pulse: 62   SpO2: 98%       Physical exam:     Physical Exam    Julio Mathis MD  Cardiology fellow-FY1  ==  PLEASE NOTE:  This encounter was completed utilizing the [employer] Direct Speech Voice Recognition Software. Grammatical errors, random word insertions, pronoun errors and incomplete sentences are occasional consequences of the system due to software limitations, ambient noise and hardware issues.These may be missed by proof reading prior to affixing electronic signature. Any questions or concerns about the content, text or information contained within the body of this dictation should be directly addressed to the physician for clarification. Please do not hesitate to call me directly if you have any any questions or concerns.

## 2025-03-17 ENCOUNTER — HOSPITAL ENCOUNTER (EMERGENCY)
Facility: HOSPITAL | Age: 60
Discharge: HOME | End: 2025-03-17
Attending: EMERGENCY MEDICINE | Admitting: EMERGENCY MEDICINE
Payer: MEDICARE

## 2025-03-17 ENCOUNTER — APPOINTMENT (EMERGENCY)
Dept: RADIOLOGY | Facility: HOSPITAL | Age: 60
End: 2025-03-17
Payer: MEDICARE

## 2025-03-17 VITALS
OXYGEN SATURATION: 97 % | RESPIRATION RATE: 18 BRPM | HEIGHT: 66 IN | DIASTOLIC BLOOD PRESSURE: 72 MMHG | SYSTOLIC BLOOD PRESSURE: 161 MMHG | TEMPERATURE: 98.3 F | HEART RATE: 56 BPM | BODY MASS INDEX: 40.28 KG/M2 | WEIGHT: 250.66 LBS

## 2025-03-17 DIAGNOSIS — Z76.89 ENCOUNTER FOR MEDICATION ADMINISTRATION: ICD-10-CM

## 2025-03-17 DIAGNOSIS — R07.9 CHEST PAIN: Primary | ICD-10-CM

## 2025-03-17 LAB
ANION GAP SERPL CALCULATED.3IONS-SCNC: 6 MMOL/L (ref 4–13)
ATRIAL RATE: 300 BPM
BASOPHILS # BLD AUTO: 0.03 THOUSANDS/ÂΜL (ref 0–0.1)
BASOPHILS NFR BLD AUTO: 1 % (ref 0–1)
BUN SERPL-MCNC: 18 MG/DL (ref 5–25)
CALCIUM SERPL-MCNC: 9.1 MG/DL (ref 8.4–10.2)
CARDIAC TROPONIN I PNL SERPL HS: 3 NG/L (ref ?–50)
CHLORIDE SERPL-SCNC: 105 MMOL/L (ref 96–108)
CO2 SERPL-SCNC: 29 MMOL/L (ref 21–32)
CREAT SERPL-MCNC: 0.94 MG/DL (ref 0.6–1.3)
EOSINOPHIL # BLD AUTO: 0.07 THOUSAND/ÂΜL (ref 0–0.61)
EOSINOPHIL NFR BLD AUTO: 2 % (ref 0–6)
ERYTHROCYTE [DISTWIDTH] IN BLOOD BY AUTOMATED COUNT: 13.8 % (ref 11.6–15.1)
GFR SERPL CREATININE-BSD FRML MDRD: 89 ML/MIN/1.73SQ M
GLUCOSE SERPL-MCNC: 95 MG/DL (ref 65–140)
HCT VFR BLD AUTO: 39.6 % (ref 36.5–49.3)
HGB BLD-MCNC: 12.8 G/DL (ref 12–17)
IMM GRANULOCYTES # BLD AUTO: 0.01 THOUSAND/UL (ref 0–0.2)
IMM GRANULOCYTES NFR BLD AUTO: 0 % (ref 0–2)
LYMPHOCYTES # BLD AUTO: 1.09 THOUSANDS/ÂΜL (ref 0.6–4.47)
LYMPHOCYTES NFR BLD AUTO: 28 % (ref 14–44)
MCH RBC QN AUTO: 31 PG (ref 26.8–34.3)
MCHC RBC AUTO-ENTMCNC: 32.3 G/DL (ref 31.4–37.4)
MCV RBC AUTO: 96 FL (ref 82–98)
MONOCYTES # BLD AUTO: 0.54 THOUSAND/ÂΜL (ref 0.17–1.22)
MONOCYTES NFR BLD AUTO: 14 % (ref 4–12)
NEUTROPHILS # BLD AUTO: 2.22 THOUSANDS/ÂΜL (ref 1.85–7.62)
NEUTS SEG NFR BLD AUTO: 55 % (ref 43–75)
NRBC BLD AUTO-RTO: 0 /100 WBCS
PLATELET # BLD AUTO: 190 THOUSANDS/UL (ref 149–390)
PMV BLD AUTO: 9.7 FL (ref 8.9–12.7)
POTASSIUM SERPL-SCNC: 4 MMOL/L (ref 3.5–5.3)
QRS AXIS: -6 DEGREES
QRSD INTERVAL: 120 MS
QT INTERVAL: 414 MS
QTC INTERVAL: 423 MS
RBC # BLD AUTO: 4.13 MILLION/UL (ref 3.88–5.62)
SODIUM SERPL-SCNC: 140 MMOL/L (ref 135–147)
T WAVE AXIS: 40 DEGREES
VENTRICULAR RATE: 63 BPM
WBC # BLD AUTO: 3.96 THOUSAND/UL (ref 4.31–10.16)

## 2025-03-17 PROCEDURE — 36415 COLL VENOUS BLD VENIPUNCTURE: CPT | Performed by: EMERGENCY MEDICINE

## 2025-03-17 PROCEDURE — 99285 EMERGENCY DEPT VISIT HI MDM: CPT

## 2025-03-17 PROCEDURE — 85025 COMPLETE CBC W/AUTO DIFF WBC: CPT | Performed by: EMERGENCY MEDICINE

## 2025-03-17 PROCEDURE — 71045 X-RAY EXAM CHEST 1 VIEW: CPT

## 2025-03-17 PROCEDURE — 99285 EMERGENCY DEPT VISIT HI MDM: CPT | Performed by: EMERGENCY MEDICINE

## 2025-03-17 PROCEDURE — 80048 BASIC METABOLIC PNL TOTAL CA: CPT | Performed by: EMERGENCY MEDICINE

## 2025-03-17 PROCEDURE — 93005 ELECTROCARDIOGRAM TRACING: CPT

## 2025-03-17 PROCEDURE — 84484 ASSAY OF TROPONIN QUANT: CPT | Performed by: EMERGENCY MEDICINE

## 2025-03-17 RX ORDER — DIVALPROEX SODIUM 250 MG/1
500 TABLET, DELAYED RELEASE ORAL ONCE
Status: COMPLETED | OUTPATIENT
Start: 2025-03-17 | End: 2025-03-17

## 2025-03-17 RX ORDER — ESCITALOPRAM OXALATE 20 MG/1
20 TABLET ORAL ONCE
Status: COMPLETED | OUTPATIENT
Start: 2025-03-17 | End: 2025-03-17

## 2025-03-17 RX ORDER — CALCIUM CARBONATE 500 MG/1
1 TABLET, CHEWABLE ORAL 3 TIMES DAILY PRN
COMMUNITY

## 2025-03-17 RX ADMIN — RISPERIDONE 1.5 MG: 0.25 TABLET, FILM COATED ORAL at 12:32

## 2025-03-17 RX ADMIN — ESCITALOPRAM OXALATE 20 MG: 20 TABLET ORAL at 12:32

## 2025-03-17 RX ADMIN — DIVALPROEX SODIUM 500 MG: 250 TABLET, DELAYED RELEASE ORAL at 12:32

## 2025-03-17 NOTE — ED PROVIDER NOTES
Time reflects when diagnosis was documented in both MDM as applicable and the Disposition within this note       Time User Action Codes Description Comment    3/17/2025 12:12 PM Stephen Peters [R07.9] Chest pain     3/17/2025 12:19 PM Stephen Peters [Z76.89] Encounter for medication administration           ED Disposition       ED Disposition   Discharge    Condition   Stable    Date/Time   Mon Mar 17, 2025 12:12 PM    Comment   Orlin John discharge to home/self care.                   Assessment & Plan       Medical Decision Making  59-year-old male presented to the emergency department for ration of chest pain.  On arrival patient awake, alert, oriented and in no acute distress.  EKG ordered to evaluate for acute ischemia/arrhythmia.  EKG on my independent interpretation with a sinus rhythm with no acute ischemic changes.  Chest x-ray ordered to evaluate for acute cardiopulmonary process including but not limited to pneumonia, pneumothorax, edema, effusion.  Chest x-ray on my independent interpretation with no acute findings.  Blood work overall grossly unremarkable including a troponin within normal limits.  The patient was provided with a dose of his prescribed Depakote, Lexapro and Risperdal.  I was informed by staff accompanying the patient that he does not need a new prescription as the physician at the patient's facility was represcribing these medications.    The patient (and/or family present) agrees with the plan for discharge and feels comfortable to go home with proper follow-up. Diagnostic tests were reviewed. Diagnosis, care plan and treatment options were discussed. All questions were answered prior to discharge. I considered the patient's other medical conditions as applicable/noted above in my medical decision making. Advised the patient to return for worsening or additional problems. The patient (and/or family present) verbalized understanding of the discharge instructions  "and warnings that would necessitate return to the Emergency Department.          Amount and/or Complexity of Data Reviewed  Independent Historian: caregiver and EMS  Labs: ordered.  Radiology: ordered and independent interpretation performed.  ECG/medicine tests: ordered and independent interpretation performed.    Risk  Prescription drug management.             Medications   divalproex sodium (DEPAKOTE) DR tablet 500 mg (500 mg Oral Given 3/17/25 1232)   escitalopram (LEXAPRO) tablet 20 mg (20 mg Oral Given 3/17/25 1232)   risperiDONE (RisperDAL) tablet 1.5 mg (1.5 mg Oral Given 3/17/25 1232)       ED Risk Strat Scores   HEART Risk Score      Flowsheet Row Most Recent Value   Heart Score Risk Calculator    History 0 Filed at: 03/17/2025 1212   ECG 1 Filed at: 03/17/2025 1212   Age 1 Filed at: 03/17/2025 1212   Risk Factors 1 Filed at: 03/17/2025 1212   Troponin 0 Filed at: 03/17/2025 1212   HEART Score 3 Filed at: 03/17/2025 1212          HEART Risk Score      Flowsheet Row Most Recent Value   Heart Score Risk Calculator    History 0 Filed at: 03/17/2025 1212   ECG 1 Filed at: 03/17/2025 1212   Age 1 Filed at: 03/17/2025 1212   Risk Factors 1 Filed at: 03/17/2025 1212   Troponin 0 Filed at: 03/17/2025 1212   HEART Score 3 Filed at: 03/17/2025 1212                                                  History of Present Illness       Chief Complaint   Patient presents with    Medical Problem     Brought in by EMS, 911 called by staff for rash, patient c/o possible chest pain, no apparent distress upon arrival, states chest pain \"for awhile\"        Past Medical History:   Diagnosis Date    Arthritis     Bipolar disorder (HCC)     Depression     Hypertension       Past Surgical History:   Procedure Laterality Date    IR THORACENTESIS  5/25/2023      History reviewed. No pertinent family history.   Social History     Tobacco Use    Smoking status: Never    Smokeless tobacco: Never   Vaping Use    Vaping status: Never Used "   Substance Use Topics    Alcohol use: Never    Drug use: Never      E-Cigarette/Vaping    E-Cigarette Use Never User       E-Cigarette/Vaping Substances    Nicotine No     THC No     CBD No     Flavoring No     Other No     Unknown No       I have reviewed and agree with the history as documented.     59-year-old male with history of intellectual disability presents to the emergency department for evaluation of chest pain.  Patient reports having intermittent left-sided chest pain over the past several months.  Patient reports that he does not currently have any active chest pain.  He is denying any associated shortness of breath.  No recent travel.  No localized numbness, tingling or weakness.  The triage note states that the patient was also having a rash.  When the patient is asked where his rash is he points to his forehead.  No rash appreciated.  A staff member from the patient's nursing facility arrived to the emergency department.  She reports that the patient ran out of prescribed psychiatric medication 3 days ago including his prescribed Depakote, Risperdal and Lexapro.  She states that the patient has been more restless than usual and is constantly moving his bilateral lower extremities.  History and review of systems is limited secondary to the patient's intellectual disability.        Review of Systems   Cardiovascular:  Positive for chest pain.   All other systems reviewed and are negative.          Objective       ED Triage Vitals   Temperature Pulse Blood Pressure Respirations SpO2 Patient Position - Orthostatic VS   03/17/25 1122 03/17/25 1122 03/17/25 1130 03/17/25 1122 03/17/25 1122 03/17/25 1130   98.3 °F (36.8 °C) 62 157/79 18 99 % Lying      Temp Source Heart Rate Source BP Location FiO2 (%) Pain Score    03/17/25 1122 03/17/25 1122 03/17/25 1130 -- 03/17/25 1230    Oral Monitor Right arm  No Pain      Vitals      Date and Time Temp Pulse SpO2 Resp BP Pain Score FACES Pain Rating User    03/17/25 1230 -- 56 97 % 18 161/72 No Pain -- KLB   03/17/25 1130 -- 61 99 % 16 157/79 -- -- KLB   03/17/25 1122 98.3 °F (36.8 °C) 62 99 % 18 -- -- -- EJN            Physical Exam  Vitals and nursing note reviewed.   Constitutional:       General: He is not in acute distress.     Appearance: He is well-developed. He is obese.   HENT:      Head: Normocephalic and atraumatic.   Eyes:      Conjunctiva/sclera: Conjunctivae normal.   Cardiovascular:      Rate and Rhythm: Normal rate and regular rhythm.      Heart sounds: No murmur heard.  Pulmonary:      Effort: Pulmonary effort is normal. No respiratory distress.      Breath sounds: Normal breath sounds.   Abdominal:      Palpations: Abdomen is soft.      Tenderness: There is no abdominal tenderness.   Musculoskeletal:         General: No swelling.      Cervical back: Neck supple.   Skin:     General: Skin is warm and dry.      Capillary Refill: Capillary refill takes less than 2 seconds.      Findings: No rash.   Neurological:      Mental Status: He is alert. Mental status is at baseline.      Cranial Nerves: Cranial nerves 2-12 are intact.      Sensory: Sensation is intact.      Comments: Patient's mental status at baseline per staff accompanying the patient.   Psychiatric:         Mood and Affect: Mood normal.         Results Reviewed       Procedure Component Value Units Date/Time    HS Troponin 0hr (reflex protocol) [[id number]]  (Normal) Collected: 03/17/25 1128    Lab Status: Final result Specimen: Blood from Arm, Left Updated: 03/17/25 1206     hs TnI 0hr 3 ng/L     Basic metabolic panel [[id number]] Collected: 03/17/25 1128    Lab Status: Final result Specimen: Blood from Arm, Left Updated: 03/17/25 1158     Sodium 140 mmol/L      Potassium 4.0 mmol/L      Chloride 105 mmol/L      CO2 29 mmol/L      ANION GAP 6 mmol/L      BUN 18 mg/dL      Creatinine 0.94 mg/dL      Glucose 95 mg/dL      Calcium 9.1 mg/dL      eGFR 89 ml/min/1.73sq m     Narrative:       [employer] guidelines for Chronic Kidney Disease (CKD):     Stage 1 with normal or high GFR (GFR > 90 mL/min/1.73 square meters)    Stage 2 Mild CKD (GFR = 60-89 mL/min/1.73 square meters)    Stage 3A Moderate CKD (GFR = 45-59 mL/min/1.73 square meters)    Stage 3B Moderate CKD (GFR = 30-44 mL/min/1.73 square meters)    Stage 4 Severe CKD (GFR = 15-29 mL/min/1.73 square meters)    Stage 5 End Stage CKD (GFR <15 mL/min/1.73 square meters)  Note: GFR calculation is accurate only with a steady state creatinine    CBC and differential [[id number]]  (Abnormal) Collected: 03/17/25 1128    Lab Status: Final result Specimen: Blood from Arm, Left Updated: 03/17/25 1142     WBC 3.96 Thousand/uL      RBC 4.13 Million/uL      Hemoglobin 12.8 g/dL      Hematocrit 39.6 %      MCV 96 fL      MCH 31.0 pg      MCHC 32.3 g/dL      RDW 13.8 %      MPV 9.7 fL      Platelets 190 Thousands/uL      nRBC 0 /100 WBCs      Segmented % 55 %      Immature Grans % 0 %      Lymphocytes % 28 %      Monocytes % 14 %      Eosinophils Relative 2 %      Basophils Relative 1 %      Absolute Neutrophils 2.22 Thousands/µL      Absolute Immature Grans 0.01 Thousand/uL      Absolute Lymphocytes 1.09 Thousands/µL      Absolute Monocytes 0.54 Thousand/µL      Eosinophils Absolute 0.07 Thousand/µL      Basophils Absolute 0.03 Thousands/µL             XR chest 1 view portable   ED Interpretation by Stephen Peters MD (03/17 1212)   No pneumothorax.  No focal consolidation.  No significant edema or effusion.      Final Interpretation by Mikhail Silverman MD (03/17 1408)      Stable right pleural thickening versus chronic small effusion without significant change from earlier studies. No acute new findings are seen            Workstation performed: [id number]             ECG 12 Lead Documentation Only    Date/Time: 3/17/2025 11:30 AM    Performed by: Stephen Peters MD  Authorized by: Stephen Peters MD    ECG  reviewed by me, the ED Provider: yes    Patient location:  ED  Previous ECG:     Previous ECG:  Compared to current    Similarity:  No change    Comparison to cardiac monitor: Yes    Interpretation:     Interpretation: abnormal    Rate:     ECG rate assessment: normal    Rhythm:     Rhythm: sinus rhythm    Ectopy:     Ectopy: none    QRS:     QRS axis:  Normal  Conduction:     Conduction: normal    ST segments:     ST segments:  Non-specific  T waves:     T waves: non-specific        ED Medication and Procedure Management   Prior to Admission Medications   Prescriptions Last Dose Informant Patient Reported? Taking?   Colchicine 0.6 MG CAPS  Outside Facility (Specify) Yes No   Sig: Take 0.6 mg by mouth daily   EPINEPHrine (EPIPEN) 0.3 mg/0.3 mL SOAJ  Outside Facility (Specify) Yes Yes   Sig: Inject 0.3 mg into a muscle   STARCH, THICKENING, PO   Yes No   Sig: Use daily for all liquids, nectar thick consistency   Patient not taking: Reported on 2/10/2025   acetaminophen (TYLENOL) 500 mg tablet   Yes No   Sig: Take 500 mg by mouth every 6 (six) hours as needed for mild pain   Patient not taking: Reported on 2/10/2025   allopurinol (ZYLOPRIM) 100 mg tablet  Outside Facility (Specify) Yes No   Sig: Take 100 mg by mouth daily   amLODIPine (NORVASC) 5 mg tablet   Yes Yes   Sig: Take 5 mg by mouth daily   benzonatate (TESSALON) 200 MG capsule   Yes No   Sig: Take 200 mg by mouth 3 (three) times a day as needed for cough   Patient not taking: Reported on 2/10/2025   bisacodyl (DULCOLAX) 5 mg EC tablet  Outside Facility (Specify) No No   Sig: Take as directed as per written office instructions   Patient not taking: Reported on 8/30/2023   calcium carbonate (TUMS) 500 mg chewable tablet   Yes Yes   Sig: Chew 1 tablet 3 (three) times a day as needed for indigestion or heartburn   chlorhexidine (Hibiclens) 4 % external liquid   Yes No   Sig: Use to wash body daily for 5 days prior to scheduled surgery.  Use new wash cloth each  time.   Patient not taking: Reported on 2/10/2025   dicyclomine (BENTYL) 10 mg capsule  Outside Facility (Specify) No No   Sig: Take 1 capsule (10 mg total) by mouth 4 (four) times a day as needed (abdominal pain)   Patient not taking: Reported on 2/10/2025   diphenhydrAMINE (BENADRYL) 50 mg capsule  Outside Facility (Specify) Yes No   Sig: Take 50 mg by mouth every 6 (six) hours as needed   Patient not taking: Reported on 4/15/2024   diphenoxylate-atropine (LOMOTIL) 2.5-0.025 mg per tablet  Outside Facility (Specify) Yes No   Sig: Take 2 tablets by mouth 2 (two) times a day as needed   Patient not taking: Reported on 4/15/2024   divalproex sodium (DEPAKOTE) 500 mg DR tablet 3/14/2025 Outside Facility (Specify) Yes Yes   Sig: Take 500 mg by mouth 2 (two) times a day   escitalopram (LEXAPRO) 20 mg tablet 3/14/2025 Outside Facility (Specify) Yes Yes   Sig: Take 20 mg by mouth daily   famotidine (PEPCID) 40 MG tablet   No Yes   Sig: Take 1 tablet (40 mg total) by mouth daily   furosemide (LASIX) 20 mg tablet 3/17/2025 Outside Facility (Specify) Yes Yes   Sig: Take 1 tablet by mouth daily as needed   ibuprofen (MOTRIN) 400 mg tablet  Outside Facility (Specify) Yes No   Sig: Take 600 mg by mouth every 6 (six) hours as needed   losartan (COZAAR) 100 MG tablet  Outside Facility (Specify) Yes No   Sig: Take 25 mg by mouth daily   Patient not taking: Reported on 8/30/2023   losartan (COZAAR) 100 MG tablet  Outside Facility (Specify) Yes No   Sig: Take 100 mg by mouth daily   menthol-zinc oxide (CALMOSEPTINE) 0.44-20.6 % OINT  Outside Facility (Specify) Yes Yes   Sig: Apply topically   montelukast (SINGULAIR) 10 mg tablet  Outside Facility (Specify) Yes Yes   Sig: Take 10 mg by mouth   omeprazole (PriLOSEC) 40 MG capsule   No Yes   Sig: TAKE ONE CAPSULE BY MOUTH ONCE EVERY DAY FOR GERD/REFLUX   polyethylene glycol (GLYCOLAX) 17 GM/SCOOP powder  Outside Facility (Specify) Yes No   Sig: Take 17 g by mouth   polyethylene glycol  (GOLYTELY) 4000 mL solution   No No   Sig: Take 4,000 mL by mouth once for 1 dose   psyllium (METAMUCIL) 0.52 g capsule   Yes No   Sig: Take 0.52 g by mouth 2 (two) times a day   Patient not taking: Reported on 2/10/2025   risperiDONE (RisperDAL) 0.5 mg tablet 3/14/2025 Outside Facility (Specify) Yes Yes   Sig: Take 0.5 mg by mouth 2 (two) times a day   risperiDONE (RisperDAL) 1 mg tablet 3/14/2025 Outside Facility (Specify) Yes Yes   Sig: Take 1 mg by mouth 2 (two) times a day   senna (SENOKOT) 8.6 mg   No No   Sig: Take 2 tablets (17.2 mg total) by mouth daily at bedtime as needed for constipation   sucralfate (CARAFATE) 1 g tablet  Outside Facility (Specify) No No   Sig: Take 1 tablet (1 g total) by mouth 4 (four) times a day   Patient not taking: Reported on 2/10/2025   tamsulosin (FLOMAX) 0.4 mg 3/16/2025 Outside Facility (Specify) Yes Yes   Sig: Take 0.4 mg by mouth daily at bedtime      Facility-Administered Medications: None     Discharge Medication List as of 3/17/2025 12:42 PM        CONTINUE these medications which have NOT CHANGED    Details   calcium carbonate (TUMS) 500 mg chewable tablet Chew 1 tablet 3 (three) times a day as needed for indigestion or heartburn, Historical Med      acetaminophen (TYLENOL) 500 mg tablet Take 500 mg by mouth every 6 (six) hours as needed for mild pain, Historical Med      allopurinol (ZYLOPRIM) 100 mg tablet Take 100 mg by mouth daily, Starting Fri 3/24/2023, Until Mon 2/10/2025, Historical Med      amLODIPine (NORVASC) 5 mg tablet Take 5 mg by mouth daily, Starting Fri 10/6/2023, Until Mon 3/17/2025, Historical Med      benzonatate (TESSALON) 200 MG capsule Take 200 mg by mouth 3 (three) times a day as needed for cough, Historical Med      bisacodyl (DULCOLAX) 5 mg EC tablet Take as directed as per written office instructions, Normal      chlorhexidine (Hibiclens) 4 % external liquid Use to wash body daily for 5 days prior to scheduled surgery.  Use new wash cloth each  time., Historical Med      Colchicine 0.6 MG CAPS Take 0.6 mg by mouth daily, Starting Mon 4/24/2023, Historical Med      dicyclomine (BENTYL) 10 mg capsule Take 1 capsule (10 mg total) by mouth 4 (four) times a day as needed (abdominal pain), Starting Tue 8/8/2023, Normal      diphenhydrAMINE (BENADRYL) 50 mg capsule Take 50 mg by mouth every 6 (six) hours as needed, Starting Fri 3/17/2023, Historical Med      diphenoxylate-atropine (LOMOTIL) 2.5-0.025 mg per tablet Take 2 tablets by mouth 2 (two) times a day as needed, Starting Fri 3/17/2023, Historical Med      divalproex sodium (DEPAKOTE) 500 mg DR tablet Take 500 mg by mouth 2 (two) times a day, Historical Med      EPINEPHrine (EPIPEN) 0.3 mg/0.3 mL SOAJ Inject 0.3 mg into a muscle, Starting Fri 3/17/2023, Historical Med      escitalopram (LEXAPRO) 20 mg tablet Take 20 mg by mouth daily, Historical Med      famotidine (PEPCID) 40 MG tablet Take 1 tablet (40 mg total) by mouth daily, Starting Thu 11/7/2024, Normal      furosemide (LASIX) 20 mg tablet Take 1 tablet by mouth daily as needed, Starting Fri 3/17/2023, Until Mon 3/17/2025 at 2359, Historical Med      ibuprofen (MOTRIN) 400 mg tablet Take 600 mg by mouth every 6 (six) hours as needed, Starting Tue 5/30/2023, Historical Med      losartan (COZAAR) 100 MG tablet Take 25 mg by mouth daily, Historical Med      menthol-zinc oxide (CALMOSEPTINE) 0.44-20.6 % OINT Apply topically, Starting Fri 3/17/2023, Historical Med      montelukast (SINGULAIR) 10 mg tablet Take 10 mg by mouth, Starting Fri 3/17/2023, Historical Med      omeprazole (PriLOSEC) 40 MG capsule TAKE ONE CAPSULE BY MOUTH ONCE EVERY DAY FOR GERD/REFLUX, Normal      polyethylene glycol (GLYCOLAX) 17 GM/SCOOP powder Take 17 g by mouth, Starting Fri 4/21/2023, Until Sat 4/20/2024 at 2359, Historical Med      polyethylene glycol (GOLYTELY) 4000 mL solution Take 4,000 mL by mouth once for 1 dose, Starting Tue 8/8/2023, Normal      psyllium (METAMUCIL)  0.52 g capsule Take 0.52 g by mouth 2 (two) times a day, Historical Med      !! risperiDONE (RisperDAL) 0.5 mg tablet Take 0.5 mg by mouth 2 (two) times a day, Historical Med      !! risperiDONE (RisperDAL) 1 mg tablet Take 1 mg by mouth 2 (two) times a day, Historical Med      senna (SENOKOT) 8.6 mg Take 2 tablets (17.2 mg total) by mouth daily at bedtime as needed for constipation, Starting Mon 5/29/2023, Until Mon 4/15/2024 at 2359, Normal      STARCH, THICKENING, PO Use daily for all liquids, nectar thick consistency, Historical Med      sucralfate (CARAFATE) 1 g tablet Take 1 tablet (1 g total) by mouth 4 (four) times a day, Starting Sat 6/3/2023, Normal      tamsulosin (FLOMAX) 0.4 mg Take 0.4 mg by mouth daily at bedtime, Starting Fri 3/17/2023, Until Mon 3/17/2025, Historical Med       !! - Potential duplicate medications found. Please discuss with provider.        No discharge procedures on file.  ED SEPSIS DOCUMENTATION   Time reflects when diagnosis was documented in both MDM as applicable and the Disposition within this note       Time User Action Codes Description Comment    3/17/2025 12:12 PM Stephen Peters [R07.9] Chest pain     3/17/2025 12:19 PM Stephen Peters [Z76.89] Encounter for medication administration                  Stephen Peters MD  03/17/25 1936

## 2025-03-19 LAB
ATRIAL RATE: 60 BPM
P AXIS: 49 DEGREES
PR INTERVAL: 148 MS
QRS AXIS: -11 DEGREES
QRSD INTERVAL: 98 MS
QT INTERVAL: 414 MS
QTC INTERVAL: 414 MS
T WAVE AXIS: 24 DEGREES
VENTRICULAR RATE: 60 BPM

## 2025-03-19 PROCEDURE — 93010 ELECTROCARDIOGRAM REPORT: CPT | Performed by: INTERNAL MEDICINE

## 2025-05-06 ENCOUNTER — HOSPITAL ENCOUNTER (OUTPATIENT)
Dept: NON INVASIVE DIAGNOSTICS | Facility: CLINIC | Age: 60
Discharge: HOME | End: 2025-05-06
Payer: MEDICARE

## 2025-05-06 DIAGNOSIS — I72.8 SPLENIC ARTERY ANEURYSM (HCC): ICD-10-CM

## 2025-05-06 PROCEDURE — 93975 VASCULAR STUDY: CPT

## 2025-05-07 PROCEDURE — 93975 VASCULAR STUDY: CPT | Performed by: SURGERY

## 2025-05-14 ENCOUNTER — TELEPHONE (OUTPATIENT)
Dept: SURGICAL ONCOLOGY | Facility: CLINIC | Age: 60
End: 2025-05-14

## 2025-05-14 NOTE — TELEPHONE ENCOUNTER
Called patient's , left voice message in regards to reschedule 6/2/25 with REBECCA Bravo to after July 1st CT, so there are results prior to follow up appointment. Provided surgical oncology number [phone] and someone could assist in rescheduling.

## 2025-05-15 ENCOUNTER — TELEPHONE (OUTPATIENT)
Dept: SURGICAL ONCOLOGY | Facility: CLINIC | Age: 60
End: 2025-05-15

## 2025-05-15 NOTE — TELEPHONE ENCOUNTER
Called patient and was transferred to adult services, spoke to Coby. Patient's follow up is rescheduled 7/8/25 with REBECCA Bravo. Cancelled 6/2/25 appointment, diagnostic are needed prior to appointment. CT scheduled 7/1/25.

## 2025-05-21 NOTE — ASSESSMENT & PLAN NOTE
Monitor off of antihypertensives while n p o    Can add as needed medication if becomes hypertensive Recommended patient follow up with PCP for further evaluation and management. Return to ED if patient develops worsening pain, fever, chills, redness, warmth, swelling, nausea, vomiting, diarrhea.

## 2025-05-28 ENCOUNTER — OFFICE VISIT (OUTPATIENT)
Dept: VASCULAR SURGERY | Facility: CLINIC | Age: 60
End: 2025-05-28
Payer: MEDICARE

## 2025-05-28 VITALS
WEIGHT: 246 LBS | HEART RATE: 62 BPM | BODY MASS INDEX: 39.53 KG/M2 | SYSTOLIC BLOOD PRESSURE: 120 MMHG | DIASTOLIC BLOOD PRESSURE: 64 MMHG | HEIGHT: 66 IN | OXYGEN SATURATION: 98 %

## 2025-05-28 DIAGNOSIS — K86.9 PANCREATIC LESION: ICD-10-CM

## 2025-05-28 DIAGNOSIS — I72.8 SPLENIC ARTERY ANEURYSM (HCC): Primary | ICD-10-CM

## 2025-05-28 DIAGNOSIS — F79 MENTAL IMPAIRMENT: ICD-10-CM

## 2025-05-28 DIAGNOSIS — F31.9 BIPOLAR AFFECTIVE DISORDER, REMISSION STATUS UNSPECIFIED (HCC): ICD-10-CM

## 2025-05-28 PROCEDURE — 99213 OFFICE O/P EST LOW 20 MIN: CPT | Performed by: SURGERY

## 2025-05-28 NOTE — PROGRESS NOTES
"Name: Orlin John      : 1965      MRN: [id number]  Encounter Provider: Erna Rosas MD  Encounter Date: 2025   Encounter department: [facility] [place]  :  Assessment & Plan    Pt is a 61 yo M w/ hx SBO, partial GERD, asthma, s/p ?umbilical hernia repair?, LUPIS, HTN, asthma, pleural effusion s/p thoracentesis, gout arthritis, BPH, bipolar disorder, mental impairment, morbid obesity, anemia, pancreatic lesion, esophagitis, splenic artery aneurysm     Splenic artery aneurysm (HCC)  -reviewed CTA 24 which shows splenic artery aneurysm near the hilum with calcified ring which is 2.1cm  -pt has CT C/A/P w/ contrast at [facility] and the splenic artery aneurysm is commented as \"stable\" but no measurement was given and cannot review these films directly  -reviewed mesenteric DU which shows splenic artery aneurysm 2.0cm  -discussed aneurysmal disease and indications for surgical treatment including size >3cm or rapid growth  -will continue surveillance for this on a yearly basis w/ DU (can also review any CT scans ordered for other indications)  -f/u 1 year    Pancreatic lesion  -presumed to be neuroendocrine tumor  -follow w/ Dr. Pérez    Mental impairment  Bipolar affective disorder, remission status unspecified (HCC)  -lives in group home and comes in with his      Medication  -consider starting aspirin 81mg once daily for aneurysmal disease; should discuss with PCP given concomitant GI issues    History of Present Illness     HPI:    Patient returns for [facility] for splenic artery aneurysm.    Complains of abdominal pain but only when prompted.  This is chronic.  Denies back pain.  Follows with GI and with Dr. Pérez for a pancreatic tail lesion favored as neuroendocrine tumor    Has chest pain and has gone to the ER multiple times for this with negative cardiac workup.      He has BLE edema.  He is wearing compression today.  He walks with a walker for balance and mostly " "sits in a chair.    Patient lives in a group home and comes today with his \"direct support\" person.      Never smoker.    Orlin John is a 60 y.o. male who presents     Pt [place] 6 mo [place] [facility] [place] Pt denies any lower back pain, abdominal pain. Pt does not monitor BP.        Review of Systems   Gastrointestinal:  Negative for abdominal pain.   Musculoskeletal:  Negative for back pain.          Objective   /64 (BP Location: Right arm, Patient Position: Sitting, Cuff Size: Standard)   Pulse 62   Ht 5' 6\" (1.676 m)   Wt 112 kg (246 lb)   SpO2 98%   BMI 39.71 kg/m²      Physical Exam    Cardiovascular:      Rate and Rhythm: Normal rate and regular rhythm.      Pulses:           Radial pulses are 2+ on the right side and 2+ on the left side.        Dorsalis pedis pulses are 2+ on the right side and 2+ on the left side.        Posterior tibial pulses are 2+ on the right side and 0 on the left side.      Heart sounds: No murmur heard.  Pulmonary:      Effort: No respiratory distress.      Breath sounds: No wheezing or rales.   Abdominal:      General: There is no distension.      Palpations: Abdomen is soft.      Tenderness: There is abdominal tenderness (R middle abodmen, mild). There is no guarding or rebound.     Musculoskeletal:      Right lower le+ Edema present.      Left lower le+ Edema present.     Skin:     Comments: No wounds or skin changes; mild hair loss L>R ankles           I have reviewed and made appropriate changes to the review of systems input by the medical assistant.    Vitals:    25 0936   BP: 120/64   BP Location: Right arm   Patient Position: Sitting   Cuff Size: Standard   Pulse: 62   SpO2: 98%   Weight: 112 kg (246 lb)   Height: 5' 6\" (1.676 m)       Problem List[1]    Past Surgical History[2]    Family History[3]    Social History     Socioeconomic History   • Marital status: Single     Spouse name: Not on file   • Number of children: Not on file   • " Years of education: Not on file   • Highest education level: Not on file   Occupational History   • Not on file   Tobacco Use   • Smoking status: Never   • Smokeless tobacco: Never   Vaping Use   • Vaping status: Never Used   Substance and Sexual Activity   • Alcohol use: Never   • Drug use: Never   • Sexual activity: Not on file   Other Topics Concern   • Not on file   Social History Narrative   • Not on file     Social Drivers of Health     Financial Resource Strain: Patient Unable To Answer (12/8/2024)    Received from [facility]    Overall Financial Resource Strain (CARDIA)    • Difficulty of Paying Living Expenses: Patient unable to answer   Food Insecurity: Patient Unable To Answer (12/8/2024)    Received from [facility]    Hunger Vital Sign    • Within the past 12 months, you worried that your food would run out before you got the money to buy more.: Patient unable to answer    • Within the past 12 months, the food you bought just didn't last and you didn't have money to get more.: Patient unable to answer   Transportation Needs: No Transportation Needs (1/21/2025)    Received from [facility]    OASIS : Transportation    • Lack of Transportation (Medical): No    • Lack of Transportation (Non-Medical): No    • Patient Unable or Declines to Respond: No   Physical Activity: Not on file   Stress: Not on file   Social Connections: Not on file   Intimate Partner Violence: Patient Unable To Answer (12/8/2024)    Received from [facility]    Humiliation, Afraid, Rape, and Kick questionnaire    • Within the last year, have you been afraid of your partner or ex-partner?: Patient unable to answer    • Within the last year, have you been humiliated or emotionally abused in other ways by your partner or ex-partner?: Patient unable to answer    • Within the last year, have you been kicked, hit, slapped, or otherwise physically hurt by your partner  or ex-partner?: Patient unable to answer    • Within the last year, have you been raped or forced to have any kind of sexual activity by your partner or ex-partner?: Patient unable to answer   Housing Stability: Patient Unable To Answer (12/8/2024)    Received from [facility]    Housing Stability Vital Sign    • In the last 12 months, was there a time when you were not able to pay the mortgage or rent on time?: Patient unable to answer    • In the past 12 months, how many times have you moved where you were living?: 0    • At any time in the past 12 months, were you homeless or living in a shelter (including now)?: Patient unable to answer       Allergies[4]    Current Medications[5]           [1]  Patient Active Problem List  Diagnosis   • Bipolar disorder (HCC)   • BPH (benign prostatic hyperplasia)   • Cardiomegaly   • Chronic diarrhea   • Essential hypertension   • Gouty arthritis of right great toe   • Mental impairment   • Mild persistent asthma without complication   • Morbid obesity (HCC)   • Normocytic anemia   • LUPIS (obstructive sleep apnea)   • Recurrent pleural effusion on right   • Small bowel obstruction, partial (HCC)   • Esophagitis   • Pancreatic lesion   • Aspiration pneumonitis (HCC)   • Acute anemia   • Splenic artery aneurysm (HCC)   [2]  Past Surgical History:  Procedure Laterality Date   • IR THORACENTESIS  5/25/2023   [3]  No family history on file.[4]  Allergies  Allergen Reactions   • Bee Venom Anaphylaxis, Hives and Swelling   [5]    Current Outpatient Medications:   •  calcium carbonate (TUMS) 500 mg chewable tablet, Chew 1 tablet as needed in the morning and 1 tablet as needed at noon and 1 tablet as needed in the evening for indigestion or heartburn., Disp: , Rfl:   •  Colchicine 0.6 MG CAPS, Take 0.6 mg by mouth in the morning., Disp: , Rfl:   •  divalproex sodium (DEPAKOTE) 500 mg DR tablet, Take 500 mg by mouth in the morning and 500 mg in the evening., Disp: , Rfl:    •  EPINEPHrine (EPIPEN) 0.3 mg/0.3 mL SOAJ, Inject 0.3 mg into a muscle, Disp: , Rfl:   •  escitalopram (LEXAPRO) 20 mg tablet, Take 20 mg by mouth in the morning., Disp: , Rfl:   •  famotidine (PEPCID) 40 MG tablet, Take 1 tablet (40 mg total) by mouth daily, Disp: 30 tablet, Rfl: 5  •  ibuprofen (MOTRIN) 400 mg tablet, Take 600 mg by mouth every 6 (six) hours as needed, Disp: , Rfl:   •  menthol-zinc oxide (CALMOSEPTINE) 0.44-20.6 % OINT, Apply topically, Disp: , Rfl:   •  montelukast (SINGULAIR) 10 mg tablet, Take 10 mg by mouth, Disp: , Rfl:   •  omeprazole (PriLOSEC) 40 MG capsule, TAKE ONE CAPSULE BY MOUTH ONCE EVERY DAY FOR GERD/REFLUX, Disp: 90 capsule, Rfl: 1  •  risperiDONE (RisperDAL) 0.5 mg tablet, Take 0.5 mg by mouth in the morning and 0.5 mg in the evening., Disp: , Rfl:   •  risperiDONE (RisperDAL) 1 mg tablet, Take 1 mg by mouth in the morning and 1 mg in the evening., Disp: , Rfl:   •  acetaminophen (TYLENOL) 500 mg tablet, Take 500 mg by mouth every 6 (six) hours as needed for mild pain (Patient not taking: Reported on 2/10/2025), Disp: , Rfl:   •  allopurinol (ZYLOPRIM) 100 mg tablet, Take 100 mg by mouth daily, Disp: , Rfl:   •  amLODIPine (NORVASC) 5 mg tablet, Take 5 mg by mouth daily, Disp: , Rfl:   •  benzonatate (TESSALON) 200 MG capsule, Take 200 mg by mouth 3 (three) times a day as needed for cough (Patient not taking: Reported on 2/10/2025), Disp: , Rfl:   •  bisacodyl (DULCOLAX) 5 mg EC tablet, Take as directed as per written office instructions (Patient not taking: Reported on 8/30/2023), Disp: 2 tablet, Rfl: 0  •  chlorhexidine (Hibiclens) 4 % external liquid, Use to wash body daily for 5 days prior to scheduled surgery.  Use new wash cloth each time. (Patient not taking: Reported on 2/10/2025), Disp: , Rfl:   •  dicyclomine (BENTYL) 10 mg capsule, Take 1 capsule (10 mg total) by mouth 4 (four) times a day as needed (abdominal pain) (Patient not taking: Reported on 2/10/2025), Disp:  60 capsule, Rfl: 2  •  diphenhydrAMINE (BENADRYL) 50 mg capsule, Take 50 mg by mouth every 6 (six) hours as needed (Patient not taking: Reported on 4/15/2024), Disp: , Rfl:   •  diphenoxylate-atropine (LOMOTIL) 2.5-0.025 mg per tablet, Take 2 tablets by mouth 2 (two) times a day as needed (Patient not taking: Reported on 4/15/2024), Disp: , Rfl:   •  furosemide (LASIX) 20 mg tablet, Take 1 tablet by mouth daily as needed, Disp: , Rfl:   •  losartan (COZAAR) 100 MG tablet, Take 25 mg by mouth daily (Patient not taking: Reported on 8/30/2023), Disp: , Rfl:   •  losartan (COZAAR) 100 MG tablet, Take 100 mg by mouth daily, Disp: , Rfl:   •  polyethylene glycol (GLYCOLAX) 17 GM/SCOOP powder, Take 17 g by mouth, Disp: , Rfl:   •  polyethylene glycol (GOLYTELY) 4000 mL solution, Take 4,000 mL by mouth once for 1 dose, Disp: 4000 mL, Rfl: 0  •  psyllium (METAMUCIL) 0.52 g capsule, Take 0.52 g by mouth 2 (two) times a day (Patient not taking: Reported on 2/10/2025), Disp: , Rfl:   •  senna (SENOKOT) 8.6 mg, Take 2 tablets (17.2 mg total) by mouth daily at bedtime as needed for constipation, Disp: 60 tablet, Rfl: 0  •  STARCH, THICKENING, PO, Use daily for all liquids, nectar thick consistency (Patient not taking: Reported on 2/10/2025), Disp: , Rfl:   •  sucralfate (CARAFATE) 1 g tablet, Take 1 tablet (1 g total) by mouth 4 (four) times a day (Patient not taking: Reported on 2/10/2025), Disp: 60 tablet, Rfl: 0  •  tamsulosin (FLOMAX) 0.4 mg, Take 0.4 mg by mouth daily at bedtime, Disp: , Rfl:

## 2025-05-28 NOTE — PATIENT INSTRUCTIONS
Splenic artery aneurysm:  -your imaging shows that your aneurysm is stable and about 2.1cm in size  -we are going to continue monitoring this aneurysm with a yearly ultrasound which is due in May '26  -consider taking aspirin 81mg once daily for aneurysmal disease if okay from a GI standpoint  -please bring an up to date list of medications to your next appointment   -please bring the name and contact information of your legal medical POA to your next appointment to add to our records

## 2025-07-01 ENCOUNTER — HOSPITAL ENCOUNTER (OUTPATIENT)
Dept: RADIOLOGY | Facility: HOSPITAL | Age: 60
Discharge: HOME | End: 2025-07-01
Attending: SURGERY
Payer: MEDICARE

## 2025-07-01 DIAGNOSIS — K86.9 PANCREATIC LESION: ICD-10-CM

## 2025-07-01 PROCEDURE — 74175 CTA ABDOMEN W/CONTRAST: CPT

## 2025-07-01 RX ADMIN — IOHEXOL 85 ML: 350 INJECTION, SOLUTION INTRAVENOUS at 14:24

## 2025-07-08 ENCOUNTER — OFFICE VISIT (OUTPATIENT)
Dept: SURGICAL ONCOLOGY | Facility: CLINIC | Age: 60
End: 2025-07-08
Payer: MEDICARE

## 2025-07-08 VITALS
TEMPERATURE: 98.2 F | BODY MASS INDEX: 42.49 KG/M2 | DIASTOLIC BLOOD PRESSURE: 86 MMHG | OXYGEN SATURATION: 98 % | SYSTOLIC BLOOD PRESSURE: 148 MMHG | HEART RATE: 68 BPM | HEIGHT: 66 IN | WEIGHT: 264.4 LBS

## 2025-07-08 DIAGNOSIS — K86.9 PANCREATIC LESION: Primary | ICD-10-CM

## 2025-07-08 PROCEDURE — 99213 OFFICE O/P EST LOW 20 MIN: CPT

## 2025-07-08 RX ORDER — CALCIUM CARBONATE 500 MG/1
500 TABLET, CHEWABLE ORAL 3 TIMES DAILY PRN
COMMUNITY
Start: 2024-12-31 | End: 2025-12-31

## 2025-07-08 NOTE — LETTER
2025     Erna Rosas MD  [address]  [address]  [place] [address]    Patient: Orlin John   YOB: 1965   Date of Visit: 2025       Dear Dr. Erna Rosas MD:    Thank you for referring Orlin John to me for evaluation. Below are my notes for this consultation.    If you have questions, please do not hesitate to call me. I look forward to following your patient along with you.         Sincerely,        REBECCA Machado        CC: No Recipients    REBECCA Machado  2025  3:50 PM  Sign when Signing Visit  Name: Orlin John      : 1965      MRN: [id number]  Encounter Provider: REBECCA Machado  Encounter Date: 2025   Encounter department: [facility] [place]  :  Assessment & Plan  Pancreatic lesion  Previously identified lesion in the pancreas actually appears consistent with a splenic artery aneurysm. There is no evidence at this time of a solid tumor. No further follow-up with Surgical Oncology is necessary. He has been instructed to follow up with Vascular Surgery for continued aneurysm surveillance.           History of Present Illness  Chief Complaint   Patient presents with   • Follow-up     Return for new concerns.    Pertinent Medical History  This is a 59 y/o male who presents today for continued surveillance of a pancreatic lesion. This was initially presumed to be a neuroendocrine tumor. However EUS did  not find any evidence of a solid tumor, but rather vascular structures.  Follow-up CTA also suggested the lesion was a splenic artery aneurysm.  He has been seen by Dr Rosas in Vascular Surgery, and was recommended to continue with regular observation.  He denies any abdominal pain.  CTA of the abdomen was performed on , and I have discussed these results with the patient and his aide today.        Review of Systems A complete review of systems is negative other than that  "noted above in the HPI.         Current Medications[1]   Objective  /86   Pulse 68   Temp 98.2 °F (36.8 °C)   Ht 5' 6\" (1.676 m)   Wt 120 kg (264 lb 6.4 oz)   SpO2 98%   BMI 42.68 kg/m²     Pain Screening:  Pain Score: 0-No pain  ECOG    Physical Exam  Vitals reviewed.   Constitutional:       General: He is not in acute distress.     Appearance: Normal appearance. He is obese. He is not ill-appearing or toxic-appearing.     Eyes:      General: No scleral icterus.      Cardiovascular:      Rate and Rhythm: Normal rate.   Pulmonary:      Effort: Pulmonary effort is normal.     Musculoskeletal:         General: Normal range of motion.      Cervical back: Normal range of motion.     Skin:     General: Skin is warm and dry.      Coloration: Skin is not jaundiced.     Neurological:      Mental Status: He is alert. Mental status is at baseline.     Psychiatric:         Mood and Affect: Mood normal.            Radiology Results Review: I have reviewed radiology reports from 7/1/2025 including: CTA abdomen.    CTA abdomen w wo contrast    Narrative & Impression   CT ANGIOGRAM OF THE ABDOMEN WITH AND WITHOUT  IV CONTRAST     INDICATION: K86.9: Disease of pancreas, unspecified. Follow-up pancreatic lesion.     COMPARISON: CT, dated 12/28/2023. MRI, dated 9/5/2023. CT, dated 5/25/2023.     TECHNIQUE: CT angiogram examination of the abdomen was performed according to standard protocol. This examination, like all CT scans performed in the [facility], was performed utilizing techniques to minimize radiation dose exposure,   including the use of iterative reconstruction and automated exposure control. Contrast as well as noncontrast images were obtained.  Rad dose 2298.01 mGy-cm.     IV Contrast: 85 mL of iohexol (OMNIPAQUE)  Enteric Contrast: Not administered.     FINDINGS:     VASCULAR STRUCTURES:  ABDOMINAL VASCULAR STRUCTURES:  AORTA:  The infrarenal abdominal aorta is normal in caliber, without " atherosclerotic calcifications.  CELIAC ARTERY:  Conventional branching anatomy. Redemonstrated are several splenic artery aneurysms. Dominant mid splenic artery rim calcified splenic artery aneurysm measures 18 mm (series 302, image 46), previously 18 mm in 2023. Also redemonstrated is the saccular   area of contrast opacification at the splenic hilum, measuring approximately 8 mm (series 302, image 46). This is similar dating back until studies from 2023, allowing for differences in contrast bolus timing. On coronal and sagittal reconstructions,   this appears to be a saccular aneurysm (series 601, image 118 and series 602, image 89) with a small amount of surrounding hypoattenuation in the pancreas.  SUPERIOR MESENTERIC ARTERY:  Normal caliber, without atherosclerotic calcifications.  INFERIOR MESENTERIC ARTERY:  The ostia and visualized branches are normal.  RIGHT RENAL ARTERY:  The single renal artery is normal in caliber.  LEFT RENAL ARTERY:  The single renal artery is normal in caliber.     VENOUS:  Iliocaval system is normal in caliber and normal in opacification.     OTHER FINDINGS     ABDOMEN:     LOWER CHEST: Redemonstrated small right pleural effusion with associated compressive atelectasis. Left basilar granuloma.     LIVER/BILIARY TREE: Redemonstrated vague area of geographic hyperattenuation in the peripheral right hepatic lobe (series 302, image 47) similar dating back until 2023. Again, this favors a perfusional defect.     No rounded arterial enhancing mass. Liver is noncirrhotic in morphology.     GALLBLADDER: Cholelithiasis without findings of acute cholecystitis.     SPLEEN: Unremarkable.     PANCREAS: Unremarkable.     ADRENAL GLANDS: Unremarkable.     KIDNEYS/VISUALIZED URETERS: Unremarkable. No hydronephrosis.     ADDITIONAL ABDOMINAL STRUCTURES:     STOMACH AND VISUALIZED BOWEL: Visualized colon is normal. Visualized small bowel is normal. Stomach is normal.     ABDOMINAL CAVITY: No ascites.  No pneumoperitoneum. No lymphadenopathy.     ABDOMINAL WALL: Unremarkable.     BONES: No acute fracture or suspicious osseous lesion. Spinal degenerative changes.     IMPRESSION:     Vascular:  1.  Similar size of the 8 mm saccular area of contrast opacification at the splenic hilum. This favors a small saccular aneurysm with a small amount of surrounding hypoattenuation involving the tail of the pancreas.  2.  Similar size of the dominant 18 mm mid splenic artery aneurysm.  3.  Identical area of vague geographic hyperattenuation in the peripheral right hepatic lobe, again favoring a benign perfusional defect in the absence of known biopsy-proven malignancy. Correlate with tumor markers.                 [1]  Current Outpatient Medications   Medication Sig Dispense Refill   • allopurinol (ZYLOPRIM) 100 mg tablet Take 100 mg by mouth in the morning.     • amLODIPine (NORVASC) 5 mg tablet Take 5 mg by mouth in the morning.     • calcium carbonate (TUMS) 500 mg chewable tablet Chew 1 tablet as needed in the morning and 1 tablet as needed at noon and 1 tablet as needed in the evening for indigestion or heartburn.     • calcium carbonate (Tums) 500 mg chewable tablet Chew 500 mg Three times daily as needed     • Colchicine 0.6 MG CAPS Take 0.6 mg by mouth in the morning.     • divalproex sodium (DEPAKOTE) 500 mg DR tablet Take 500 mg by mouth in the morning and 500 mg in the evening.     • EPINEPHrine (EPIPEN) 0.3 mg/0.3 mL SOAJ Inject 0.3 mg into a muscle     • escitalopram (LEXAPRO) 20 mg tablet Take 20 mg by mouth in the morning.     • famotidine (PEPCID) 40 MG tablet Take 1 tablet (40 mg total) by mouth daily 30 tablet 5   • furosemide (LASIX) 20 mg tablet Take 1 tablet by mouth daily as needed     • ibuprofen (MOTRIN) 400 mg tablet Take 600 mg by mouth every 6 (six) hours as needed     • losartan (COZAAR) 100 MG tablet Take 100 mg by mouth in the morning.     • menthol-zinc oxide (CALMOSEPTINE) 0.44-20.6 % OINT Apply  topically     • montelukast (SINGULAIR) 10 mg tablet Take 10 mg by mouth     • omeprazole (PriLOSEC) 40 MG capsule TAKE ONE CAPSULE BY MOUTH ONCE EVERY DAY FOR GERD/REFLUX 90 capsule 1   • risperiDONE (RisperDAL) 0.5 mg tablet Take 0.5 mg by mouth in the morning and 0.5 mg in the evening.     • risperiDONE (RisperDAL) 1 mg tablet Take 1 mg by mouth in the morning and 1 mg in the evening.     • acetaminophen (TYLENOL) 500 mg tablet Take 500 mg by mouth every 6 (six) hours as needed for mild pain (Patient not taking: Reported on 2/10/2025)     • benzonatate (TESSALON) 200 MG capsule Take 200 mg by mouth 3 (three) times a day as needed for cough (Patient not taking: Reported on 2/10/2025)     • bisacodyl (DULCOLAX) 5 mg EC tablet Take as directed as per written office instructions (Patient not taking: Reported on 8/30/2023) 2 tablet 0   • chlorhexidine (Hibiclens) 4 % external liquid Use to wash body daily for 5 days prior to scheduled surgery.  Use new wash cloth each time. (Patient not taking: Reported on 2/10/2025)     • dicyclomine (BENTYL) 10 mg capsule Take 1 capsule (10 mg total) by mouth 4 (four) times a day as needed (abdominal pain) (Patient not taking: Reported on 2/10/2025) 60 capsule 2   • diphenhydrAMINE (BENADRYL) 50 mg capsule Take 50 mg by mouth every 6 (six) hours as needed (Patient not taking: Reported on 4/15/2024)     • diphenoxylate-atropine (LOMOTIL) 2.5-0.025 mg per tablet Take 2 tablets by mouth 2 (two) times a day as needed (Patient not taking: Reported on 4/15/2024)     • losartan (COZAAR) 100 MG tablet Take 25 mg by mouth daily (Patient not taking: Reported on 8/30/2023)     • polyethylene glycol (GLYCOLAX) 17 GM/SCOOP powder Take 17 g by mouth (Patient not taking: Reported on 7/8/2025)     • polyethylene glycol (GOLYTELY) 4000 mL solution Take 4,000 mL by mouth once for 1 dose (Patient not taking: Reported on 7/8/2025) 4000 mL 0   • psyllium (METAMUCIL) 0.52 g capsule Take 0.52 g by mouth 2  (two) times a day (Patient not taking: Reported on 2/10/2025)     • senna (SENOKOT) 8.6 mg Take 2 tablets (17.2 mg total) by mouth daily at bedtime as needed for constipation 60 tablet 0   • STARCH, THICKENING, PO Use daily for all liquids, nectar thick consistency (Patient not taking: Reported on 2/10/2025)     • sucralfate (CARAFATE) 1 g tablet Take 1 tablet (1 g total) by mouth 4 (four) times a day (Patient not taking: Reported on 2/10/2025) 60 tablet 0   • tamsulosin (FLOMAX) 0.4 mg Take 0.4 mg by mouth daily at bedtime       No current facility-administered medications for this visit.

## 2025-07-08 NOTE — PROGRESS NOTES
"Name: Orlin John      : 1965      MRN: [id number]  Encounter Provider: REBECCA Machado  Encounter Date: 2025   Encounter department: [facility] [place]  :  Assessment & Plan  Pancreatic lesion  Previously identified lesion in the pancreas actually appears consistent with a splenic artery aneurysm. There is no evidence at this time of a solid tumor. No further follow-up with Surgical Oncology is necessary. He has been instructed to follow up with Vascular Surgery for continued aneurysm surveillance.           History of Present Illness   Chief Complaint   Patient presents with   • Follow-up     Return for new concerns.    Pertinent Medical History   This is a 59 y/o male who presents today for continued surveillance of a pancreatic lesion. This was initially presumed to be a neuroendocrine tumor. However EUS did  not find any evidence of a solid tumor, but rather vascular structures.  Follow-up CTA also suggested the lesion was a splenic artery aneurysm.  He has been seen by Dr Rosas in Vascular Surgery, and was recommended to continue with regular observation.  He denies any abdominal pain.  CTA of the abdomen was performed on , and I have discussed these results with the patient and his aide today.         Review of Systems A complete review of systems is negative other than that noted above in the HPI.         Current Medications[1]   Objective   /86   Pulse 68   Temp 98.2 °F (36.8 °C)   Ht 5' 6\" (1.676 m)   Wt 120 kg (264 lb 6.4 oz)   SpO2 98%   BMI 42.68 kg/m²     Pain Screening:  Pain Score: 0-No pain  ECOG    Physical Exam  Vitals reviewed.   Constitutional:       General: He is not in acute distress.     Appearance: Normal appearance. He is obese. He is not ill-appearing or toxic-appearing.     Eyes:      General: No scleral icterus.      Cardiovascular:      Rate and Rhythm: Normal rate.   Pulmonary:      Effort: " Pulmonary effort is normal.     Musculoskeletal:         General: Normal range of motion.      Cervical back: Normal range of motion.     Skin:     General: Skin is warm and dry.      Coloration: Skin is not jaundiced.     Neurological:      Mental Status: He is alert. Mental status is at baseline.     Psychiatric:         Mood and Affect: Mood normal.            Radiology Results Review: I have reviewed radiology reports from 7/1/2025 including: CTA abdomen.    CTA abdomen w wo contrast    Narrative & Impression   CT ANGIOGRAM OF THE ABDOMEN WITH AND WITHOUT  IV CONTRAST     INDICATION: K86.9: Disease of pancreas, unspecified. Follow-up pancreatic lesion.     COMPARISON: CT, dated 12/28/2023. MRI, dated 9/5/2023. CT, dated 5/25/2023.     TECHNIQUE: CT angiogram examination of the abdomen was performed according to standard protocol. This examination, like all CT scans performed in the [facility], was performed utilizing techniques to minimize radiation dose exposure,   including the use of iterative reconstruction and automated exposure control. Contrast as well as noncontrast images were obtained.  Rad dose 2298.01 mGy-cm.     IV Contrast: 85 mL of iohexol (OMNIPAQUE)  Enteric Contrast: Not administered.     FINDINGS:     VASCULAR STRUCTURES:  ABDOMINAL VASCULAR STRUCTURES:  AORTA:  The infrarenal abdominal aorta is normal in caliber, without atherosclerotic calcifications.  CELIAC ARTERY:  Conventional branching anatomy. Redemonstrated are several splenic artery aneurysms. Dominant mid splenic artery rim calcified splenic artery aneurysm measures 18 mm (series 302, image 46), previously 18 mm in 2023. Also redemonstrated is the saccular   area of contrast opacification at the splenic hilum, measuring approximately 8 mm (series 302, image 46). This is similar dating back until studies from 2023, allowing for differences in contrast bolus timing. On coronal and sagittal reconstructions,   this  appears to be a saccular aneurysm (series 601, image 118 and series 602, image 89) with a small amount of surrounding hypoattenuation in the pancreas.  SUPERIOR MESENTERIC ARTERY:  Normal caliber, without atherosclerotic calcifications.  INFERIOR MESENTERIC ARTERY:  The ostia and visualized branches are normal.  RIGHT RENAL ARTERY:  The single renal artery is normal in caliber.  LEFT RENAL ARTERY:  The single renal artery is normal in caliber.     VENOUS:  Iliocaval system is normal in caliber and normal in opacification.     OTHER FINDINGS     ABDOMEN:     LOWER CHEST: Redemonstrated small right pleural effusion with associated compressive atelectasis. Left basilar granuloma.     LIVER/BILIARY TREE: Redemonstrated vague area of geographic hyperattenuation in the peripheral right hepatic lobe (series 302, image 47) similar dating back until 2023. Again, this favors a perfusional defect.     No rounded arterial enhancing mass. Liver is noncirrhotic in morphology.     GALLBLADDER: Cholelithiasis without findings of acute cholecystitis.     SPLEEN: Unremarkable.     PANCREAS: Unremarkable.     ADRENAL GLANDS: Unremarkable.     KIDNEYS/VISUALIZED URETERS: Unremarkable. No hydronephrosis.     ADDITIONAL ABDOMINAL STRUCTURES:     STOMACH AND VISUALIZED BOWEL: Visualized colon is normal. Visualized small bowel is normal. Stomach is normal.     ABDOMINAL CAVITY: No ascites. No pneumoperitoneum. No lymphadenopathy.     ABDOMINAL WALL: Unremarkable.     BONES: No acute fracture or suspicious osseous lesion. Spinal degenerative changes.     IMPRESSION:     Vascular:  1.  Similar size of the 8 mm saccular area of contrast opacification at the splenic hilum. This favors a small saccular aneurysm with a small amount of surrounding hypoattenuation involving the tail of the pancreas.  2.  Similar size of the dominant 18 mm mid splenic artery aneurysm.  3.  Identical area of vague geographic hyperattenuation in the peripheral right  hepatic lobe, again favoring a benign perfusional defect in the absence of known biopsy-proven malignancy. Correlate with tumor markers.                 [1]  Current Outpatient Medications   Medication Sig Dispense Refill   • allopurinol (ZYLOPRIM) 100 mg tablet Take 100 mg by mouth in the morning.     • amLODIPine (NORVASC) 5 mg tablet Take 5 mg by mouth in the morning.     • calcium carbonate (TUMS) 500 mg chewable tablet Chew 1 tablet as needed in the morning and 1 tablet as needed at noon and 1 tablet as needed in the evening for indigestion or heartburn.     • calcium carbonate (Tums) 500 mg chewable tablet Chew 500 mg Three times daily as needed     • Colchicine 0.6 MG CAPS Take 0.6 mg by mouth in the morning.     • divalproex sodium (DEPAKOTE) 500 mg DR tablet Take 500 mg by mouth in the morning and 500 mg in the evening.     • EPINEPHrine (EPIPEN) 0.3 mg/0.3 mL SOAJ Inject 0.3 mg into a muscle     • escitalopram (LEXAPRO) 20 mg tablet Take 20 mg by mouth in the morning.     • famotidine (PEPCID) 40 MG tablet Take 1 tablet (40 mg total) by mouth daily 30 tablet 5   • furosemide (LASIX) 20 mg tablet Take 1 tablet by mouth daily as needed     • ibuprofen (MOTRIN) 400 mg tablet Take 600 mg by mouth every 6 (six) hours as needed     • losartan (COZAAR) 100 MG tablet Take 100 mg by mouth in the morning.     • menthol-zinc oxide (CALMOSEPTINE) 0.44-20.6 % OINT Apply topically     • montelukast (SINGULAIR) 10 mg tablet Take 10 mg by mouth     • omeprazole (PriLOSEC) 40 MG capsule TAKE ONE CAPSULE BY MOUTH ONCE EVERY DAY FOR GERD/REFLUX 90 capsule 1   • risperiDONE (RisperDAL) 0.5 mg tablet Take 0.5 mg by mouth in the morning and 0.5 mg in the evening.     • risperiDONE (RisperDAL) 1 mg tablet Take 1 mg by mouth in the morning and 1 mg in the evening.     • acetaminophen (TYLENOL) 500 mg tablet Take 500 mg by mouth every 6 (six) hours as needed for mild pain (Patient not taking: Reported on 2/10/2025)     •  benzonatate (TESSALON) 200 MG capsule Take 200 mg by mouth 3 (three) times a day as needed for cough (Patient not taking: Reported on 2/10/2025)     • bisacodyl (DULCOLAX) 5 mg EC tablet Take as directed as per written office instructions (Patient not taking: Reported on 8/30/2023) 2 tablet 0   • chlorhexidine (Hibiclens) 4 % external liquid Use to wash body daily for 5 days prior to scheduled surgery.  Use new wash cloth each time. (Patient not taking: Reported on 2/10/2025)     • dicyclomine (BENTYL) 10 mg capsule Take 1 capsule (10 mg total) by mouth 4 (four) times a day as needed (abdominal pain) (Patient not taking: Reported on 2/10/2025) 60 capsule 2   • diphenhydrAMINE (BENADRYL) 50 mg capsule Take 50 mg by mouth every 6 (six) hours as needed (Patient not taking: Reported on 4/15/2024)     • diphenoxylate-atropine (LOMOTIL) 2.5-0.025 mg per tablet Take 2 tablets by mouth 2 (two) times a day as needed (Patient not taking: Reported on 4/15/2024)     • losartan (COZAAR) 100 MG tablet Take 25 mg by mouth daily (Patient not taking: Reported on 8/30/2023)     • polyethylene glycol (GLYCOLAX) 17 GM/SCOOP powder Take 17 g by mouth (Patient not taking: Reported on 7/8/2025)     • polyethylene glycol (GOLYTELY) 4000 mL solution Take 4,000 mL by mouth once for 1 dose (Patient not taking: Reported on 7/8/2025) 4000 mL 0   • psyllium (METAMUCIL) 0.52 g capsule Take 0.52 g by mouth 2 (two) times a day (Patient not taking: Reported on 2/10/2025)     • senna (SENOKOT) 8.6 mg Take 2 tablets (17.2 mg total) by mouth daily at bedtime as needed for constipation 60 tablet 0   • STARCH, THICKENING, PO Use daily for all liquids, nectar thick consistency (Patient not taking: Reported on 2/10/2025)     • sucralfate (CARAFATE) 1 g tablet Take 1 tablet (1 g total) by mouth 4 (four) times a day (Patient not taking: Reported on 2/10/2025) 60 tablet 0   • tamsulosin (FLOMAX) 0.4 mg Take 0.4 mg by mouth daily at bedtime       No current  facility-administered medications for this visit.

## 2025-07-08 NOTE — ASSESSMENT & PLAN NOTE
Previously identified lesion in the pancreas actually appears consistent with a splenic artery aneurysm. There is no evidence at this time of a solid tumor. No further follow-up with Surgical Oncology is necessary. He has been instructed to follow up with Vascular Surgery for continued aneurysm surveillance.

## 2025-08-16 ENCOUNTER — OFFICE VISIT (OUTPATIENT)
Dept: URGENT CARE | Age: 60
End: 2025-08-16
Payer: COMMERCIAL